# Patient Record
Sex: MALE | Race: WHITE | NOT HISPANIC OR LATINO | Employment: OTHER | URBAN - METROPOLITAN AREA
[De-identification: names, ages, dates, MRNs, and addresses within clinical notes are randomized per-mention and may not be internally consistent; named-entity substitution may affect disease eponyms.]

---

## 2017-01-03 ENCOUNTER — ALLSCRIPTS OFFICE VISIT (OUTPATIENT)
Dept: OTHER | Facility: OTHER | Age: 62
End: 2017-01-03

## 2017-02-10 ENCOUNTER — TRANSCRIBE ORDERS (OUTPATIENT)
Dept: MRI IMAGING | Facility: CLINIC | Age: 62
End: 2017-02-10

## 2017-02-10 DIAGNOSIS — K74.00 HEPATIC FIBROSIS: ICD-10-CM

## 2017-02-10 DIAGNOSIS — Z98.890 PERSONAL HISTORY OF SURGERY TO HEART AND GREAT VESSELS, PRESENTING HAZARDS TO HEALTH: ICD-10-CM

## 2017-02-10 DIAGNOSIS — Z94.4 LIVER REPLACED BY TRANSPLANT (HCC): ICD-10-CM

## 2017-02-10 DIAGNOSIS — Z94.4 TRANSPLANTED LIVER (HCC): Primary | ICD-10-CM

## 2017-02-10 DIAGNOSIS — M25.562 LEFT KNEE PAIN, UNSPECIFIED CHRONICITY: Primary | ICD-10-CM

## 2017-02-22 ENCOUNTER — APPOINTMENT (OUTPATIENT)
Dept: LAB | Facility: CLINIC | Age: 62
End: 2017-02-22
Payer: MEDICARE

## 2017-02-22 ENCOUNTER — HOSPITAL ENCOUNTER (OUTPATIENT)
Dept: MRI IMAGING | Facility: CLINIC | Age: 62
Discharge: HOME | End: 2017-02-22
Payer: MEDICARE

## 2017-02-22 ENCOUNTER — TRANSCRIBE ORDERS (OUTPATIENT)
Dept: MRI IMAGING | Facility: CLINIC | Age: 62
End: 2017-02-22

## 2017-02-22 DIAGNOSIS — Z94.4 TRANSPLANTED LIVER (HCC): ICD-10-CM

## 2017-02-22 DIAGNOSIS — M25.562 LEFT KNEE PAIN, UNSPECIFIED CHRONICITY: ICD-10-CM

## 2017-02-22 DIAGNOSIS — Z98.890 PERSONAL HISTORY OF SURGERY TO HEART AND GREAT VESSELS, PRESENTING HAZARDS TO HEALTH: ICD-10-CM

## 2017-02-22 DIAGNOSIS — K74.00 HEPATIC FIBROSIS: ICD-10-CM

## 2017-02-22 DIAGNOSIS — Z94.4 LIVER REPLACED BY TRANSPLANT (HCC): Primary | ICD-10-CM

## 2017-02-22 DIAGNOSIS — Z94.4 LIVER REPLACED BY TRANSPLANT (HCC): ICD-10-CM

## 2017-02-22 LAB
ALBUMIN SERPL BCP-MCNC: 4 G/DL (ref 3.5–5)
ALP SERPL-CCNC: 66 U/L (ref 46–116)
ALT SERPL W P-5'-P-CCNC: 25 U/L (ref 12–78)
ANION GAP SERPL CALCULATED.3IONS-SCNC: 9 MMOL/L (ref 4–13)
AST SERPL W P-5'-P-CCNC: 28 U/L (ref 5–45)
BASOPHILS # BLD AUTO: 0.02 THOUSANDS/ΜL (ref 0–0.1)
BASOPHILS NFR BLD AUTO: 0 % (ref 0–1)
BILIRUB SERPL-MCNC: 0.82 MG/DL (ref 0.2–1)
BUN SERPL-MCNC: 18 MG/DL (ref 5–25)
CALCIUM SERPL-MCNC: 9 MG/DL (ref 8.3–10.1)
CHLORIDE SERPL-SCNC: 101 MMOL/L (ref 100–108)
CO2 SERPL-SCNC: 26 MMOL/L (ref 21–32)
CREAT SERPL-MCNC: 1.07 MG/DL (ref 0.6–1.3)
EOSINOPHIL # BLD AUTO: 0.05 THOUSAND/ΜL (ref 0–0.61)
EOSINOPHIL NFR BLD AUTO: 1 % (ref 0–6)
ERYTHROCYTE [DISTWIDTH] IN BLOOD BY AUTOMATED COUNT: 12.8 % (ref 11.6–15.1)
GFR SERPL CREATININE-BSD FRML MDRD: >60 ML/MIN/1.73SQ M
GGT SERPL-CCNC: 232 U/L (ref 5–85)
GLUCOSE SERPL-MCNC: 110 MG/DL (ref 65–140)
HCT VFR BLD AUTO: 39.8 % (ref 36.5–49.3)
HGB BLD-MCNC: 13.4 G/DL (ref 12–17)
INR PPP: 1.19 (ref 0.86–1.16)
LYMPHOCYTES # BLD AUTO: 2.99 THOUSANDS/ΜL (ref 0.6–4.47)
LYMPHOCYTES NFR BLD AUTO: 43 % (ref 14–44)
MAGNESIUM SERPL-MCNC: 1.7 MG/DL (ref 1.6–2.6)
MCH RBC QN AUTO: 32.6 PG (ref 26.8–34.3)
MCHC RBC AUTO-ENTMCNC: 33.7 G/DL (ref 31.4–37.4)
MCV RBC AUTO: 97 FL (ref 82–98)
MONOCYTES # BLD AUTO: 0.51 THOUSAND/ΜL (ref 0.17–1.22)
MONOCYTES NFR BLD AUTO: 7 % (ref 4–12)
NEUTROPHILS # BLD AUTO: 3.4 THOUSANDS/ΜL (ref 1.85–7.62)
NEUTS SEG NFR BLD AUTO: 49 % (ref 43–75)
NRBC BLD AUTO-RTO: 0 /100 WBCS
PLATELET # BLD AUTO: 190 THOUSANDS/UL (ref 149–390)
PMV BLD AUTO: 11.9 FL (ref 8.9–12.7)
POTASSIUM SERPL-SCNC: 4.9 MMOL/L (ref 3.5–5.3)
PROT SERPL-MCNC: 7.4 G/DL (ref 6.4–8.2)
PROTHROMBIN TIME: 15.2 SECONDS (ref 12–14.3)
RBC # BLD AUTO: 4.11 MILLION/UL (ref 3.88–5.62)
SODIUM SERPL-SCNC: 136 MMOL/L (ref 136–145)
WBC # BLD AUTO: 6.98 THOUSAND/UL (ref 4.31–10.16)

## 2017-02-22 PROCEDURE — 85025 COMPLETE CBC W/AUTO DIFF WBC: CPT

## 2017-02-22 PROCEDURE — 80053 COMPREHEN METABOLIC PANEL: CPT

## 2017-02-22 PROCEDURE — 82977 ASSAY OF GGT: CPT

## 2017-02-22 PROCEDURE — 85610 PROTHROMBIN TIME: CPT

## 2017-02-22 PROCEDURE — 73721 MRI JNT OF LWR EXTRE W/O DYE: CPT

## 2017-02-22 PROCEDURE — A9585 GADOBUTROL INJECTION: HCPCS | Performed by: PAIN MEDICINE

## 2017-02-22 PROCEDURE — 80197 ASSAY OF TACROLIMUS: CPT

## 2017-02-22 PROCEDURE — 83735 ASSAY OF MAGNESIUM: CPT

## 2017-02-22 PROCEDURE — 74183 MRI ABD W/O CNTR FLWD CNTR: CPT

## 2017-02-22 PROCEDURE — 36415 COLL VENOUS BLD VENIPUNCTURE: CPT

## 2017-02-22 RX ADMIN — GADOBUTROL 10 ML: 604.72 INJECTION INTRAVENOUS at 11:09

## 2017-02-24 LAB — TACROLIMUS BLD LC/MS/MS-MCNC: 8.9 NG/ML (ref 2–20)

## 2017-02-27 ENCOUNTER — GENERIC CONVERSION - ENCOUNTER (OUTPATIENT)
Dept: OTHER | Facility: OTHER | Age: 62
End: 2017-02-27

## 2017-03-07 ENCOUNTER — GENERIC CONVERSION - ENCOUNTER (OUTPATIENT)
Dept: OTHER | Facility: OTHER | Age: 62
End: 2017-03-07

## 2017-03-22 ENCOUNTER — GENERIC CONVERSION - ENCOUNTER (OUTPATIENT)
Dept: OTHER | Facility: OTHER | Age: 62
End: 2017-03-22

## 2017-03-27 ENCOUNTER — GENERIC CONVERSION - ENCOUNTER (OUTPATIENT)
Dept: OTHER | Facility: OTHER | Age: 62
End: 2017-03-27

## 2017-04-18 ENCOUNTER — GENERIC CONVERSION - ENCOUNTER (OUTPATIENT)
Dept: OTHER | Facility: OTHER | Age: 62
End: 2017-04-18

## 2017-04-28 ENCOUNTER — GENERIC CONVERSION - ENCOUNTER (OUTPATIENT)
Dept: OTHER | Facility: OTHER | Age: 62
End: 2017-04-28

## 2017-05-26 ENCOUNTER — GENERIC CONVERSION - ENCOUNTER (OUTPATIENT)
Dept: OTHER | Facility: OTHER | Age: 62
End: 2017-05-26

## 2017-06-22 ENCOUNTER — GENERIC CONVERSION - ENCOUNTER (OUTPATIENT)
Dept: OTHER | Facility: OTHER | Age: 62
End: 2017-06-22

## 2017-07-01 DIAGNOSIS — R73.01 IMPAIRED FASTING GLUCOSE: ICD-10-CM

## 2017-07-01 DIAGNOSIS — E78.5 HYPERLIPIDEMIA: ICD-10-CM

## 2017-07-01 DIAGNOSIS — I10 ESSENTIAL (PRIMARY) HYPERTENSION: ICD-10-CM

## 2017-07-06 ENCOUNTER — ALLSCRIPTS OFFICE VISIT (OUTPATIENT)
Dept: OTHER | Facility: OTHER | Age: 62
End: 2017-07-06

## 2017-07-11 ENCOUNTER — GENERIC CONVERSION - ENCOUNTER (OUTPATIENT)
Dept: OTHER | Facility: OTHER | Age: 62
End: 2017-07-11

## 2017-07-26 ENCOUNTER — GENERIC CONVERSION - ENCOUNTER (OUTPATIENT)
Dept: OTHER | Facility: OTHER | Age: 62
End: 2017-07-26

## 2017-08-31 ENCOUNTER — GENERIC CONVERSION - ENCOUNTER (OUTPATIENT)
Dept: OTHER | Facility: OTHER | Age: 62
End: 2017-08-31

## 2017-09-25 ENCOUNTER — GENERIC CONVERSION - ENCOUNTER (OUTPATIENT)
Dept: OTHER | Facility: OTHER | Age: 62
End: 2017-09-25

## 2017-10-16 ENCOUNTER — GENERIC CONVERSION - ENCOUNTER (OUTPATIENT)
Dept: OTHER | Facility: OTHER | Age: 62
End: 2017-10-16

## 2017-11-14 ENCOUNTER — GENERIC CONVERSION - ENCOUNTER (OUTPATIENT)
Dept: OTHER | Facility: OTHER | Age: 62
End: 2017-11-14

## 2017-12-19 ENCOUNTER — GENERIC CONVERSION - ENCOUNTER (OUTPATIENT)
Dept: INTERNAL MEDICINE CLINIC | Facility: CLINIC | Age: 62
End: 2017-12-19

## 2018-01-13 VITALS
BODY MASS INDEX: 29.2 KG/M2 | HEART RATE: 70 BPM | RESPIRATION RATE: 16 BRPM | WEIGHT: 204 LBS | SYSTOLIC BLOOD PRESSURE: 140 MMHG | HEIGHT: 70 IN | DIASTOLIC BLOOD PRESSURE: 72 MMHG

## 2018-01-13 VITALS
HEIGHT: 70 IN | SYSTOLIC BLOOD PRESSURE: 136 MMHG | HEART RATE: 78 BPM | DIASTOLIC BLOOD PRESSURE: 88 MMHG | WEIGHT: 211 LBS | RESPIRATION RATE: 16 BRPM | BODY MASS INDEX: 30.21 KG/M2

## 2018-01-15 ENCOUNTER — GENERIC CONVERSION - ENCOUNTER (OUTPATIENT)
Dept: INTERNAL MEDICINE CLINIC | Facility: CLINIC | Age: 63
End: 2018-01-15

## 2018-03-08 DIAGNOSIS — F41.9 ANXIETY: Primary | ICD-10-CM

## 2018-03-08 DIAGNOSIS — E78.5 HYPERLIPIDEMIA, UNSPECIFIED HYPERLIPIDEMIA TYPE: ICD-10-CM

## 2018-03-08 DIAGNOSIS — K21.00 ESOPHAGITIS, REFLUX: ICD-10-CM

## 2018-03-08 RX ORDER — PANTOPRAZOLE SODIUM 40 MG/1
1 TABLET, DELAYED RELEASE ORAL DAILY
COMMUNITY
Start: 2013-12-27 | End: 2018-03-08 | Stop reason: SDUPTHER

## 2018-03-08 RX ORDER — PANTOPRAZOLE SODIUM 40 MG/1
40 TABLET, DELAYED RELEASE ORAL DAILY
Qty: 90 TABLET | Refills: 3 | Status: SHIPPED | OUTPATIENT
Start: 2018-03-08 | End: 2018-03-15 | Stop reason: SDUPTHER

## 2018-03-08 RX ORDER — PRAVASTATIN SODIUM 20 MG
20 TABLET ORAL DAILY
Qty: 90 TABLET | Refills: 3 | Status: SHIPPED | OUTPATIENT
Start: 2018-03-08 | End: 2018-03-08 | Stop reason: SDUPTHER

## 2018-03-08 RX ORDER — ALPRAZOLAM 2 MG/1
2 TABLET ORAL 3 TIMES DAILY PRN
Qty: 90 TABLET | Refills: 3 | Status: SHIPPED | OUTPATIENT
Start: 2018-03-08 | End: 2018-10-12

## 2018-03-08 RX ORDER — PRAVASTATIN SODIUM 20 MG
1 TABLET ORAL DAILY
COMMUNITY
Start: 2013-12-27 | End: 2018-03-08 | Stop reason: SDUPTHER

## 2018-03-08 RX ORDER — ALPRAZOLAM 2 MG/1
1 TABLET ORAL 3 TIMES DAILY PRN
COMMUNITY
Start: 2013-12-27 | End: 2018-03-08 | Stop reason: SDUPTHER

## 2018-03-09 RX ORDER — PRAVASTATIN SODIUM 20 MG
TABLET ORAL
Qty: 90 TABLET | Refills: 3 | Status: SHIPPED | OUTPATIENT
Start: 2018-03-09 | End: 2018-03-15 | Stop reason: SDUPTHER

## 2018-03-12 ENCOUNTER — APPOINTMENT (EMERGENCY)
Dept: RADIOLOGY | Facility: HOSPITAL | Age: 63
End: 2018-03-12
Payer: MEDICARE

## 2018-03-12 ENCOUNTER — APPOINTMENT (EMERGENCY)
Dept: CT IMAGING | Facility: HOSPITAL | Age: 63
End: 2018-03-12
Payer: MEDICARE

## 2018-03-12 ENCOUNTER — HOSPITAL ENCOUNTER (EMERGENCY)
Facility: HOSPITAL | Age: 63
Discharge: HOME | End: 2018-03-12
Attending: EMERGENCY MEDICINE
Payer: MEDICARE

## 2018-03-12 VITALS
HEIGHT: 72 IN | OXYGEN SATURATION: 96 % | RESPIRATION RATE: 16 BRPM | BODY MASS INDEX: 26.82 KG/M2 | TEMPERATURE: 97.7 F | SYSTOLIC BLOOD PRESSURE: 136 MMHG | HEART RATE: 66 BPM | WEIGHT: 198 LBS | DIASTOLIC BLOOD PRESSURE: 76 MMHG

## 2018-03-12 DIAGNOSIS — E87.1 HYPONATREMIA: ICD-10-CM

## 2018-03-12 DIAGNOSIS — E78.5 HYPERLIPIDEMIA, UNSPECIFIED HYPERLIPIDEMIA TYPE: ICD-10-CM

## 2018-03-12 DIAGNOSIS — F41.9 ANXIETY: ICD-10-CM

## 2018-03-12 DIAGNOSIS — E83.42 HYPOMAGNESEMIA: ICD-10-CM

## 2018-03-12 DIAGNOSIS — M25.462 EFFUSION OF LEFT KNEE: ICD-10-CM

## 2018-03-12 DIAGNOSIS — K21.00 ESOPHAGITIS, REFLUX: ICD-10-CM
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
## 2018-03-12 LAB
ALBUMIN SERPL BCP-MCNC: 3.5 G/DL (ref 3.5–5)
ALP SERPL-CCNC: 131 U/L (ref 46–116)
ALT SERPL W P-5'-P-CCNC: 38 U/L (ref 12–78)
ANION GAP SERPL CALCULATED.3IONS-SCNC: 8 MMOL/L (ref 4–13)
APTT PPP: 35 SECONDS (ref 23–35)
AST SERPL W P-5'-P-CCNC: 36 U/L (ref 5–45)
BASOPHILS # BLD AUTO: 0.03 THOUSANDS/ΜL (ref 0–0.1)
BASOPHILS NFR BLD AUTO: 0 % (ref 0–1)
BILIRUB SERPL-MCNC: 0.6 MG/DL (ref 0.2–1)
BUN SERPL-MCNC: 12 MG/DL (ref 5–25)
CALCIUM SERPL-MCNC: 8.6 MG/DL (ref 8.3–10.1)
CHLORIDE SERPL-SCNC: 94 MMOL/L (ref 100–108)
CO2 SERPL-SCNC: 27 MMOL/L (ref 21–32)
CREAT SERPL-MCNC: 1.03 MG/DL (ref 0.6–1.3)
EOSINOPHIL # BLD AUTO: 0.01 THOUSAND/ΜL (ref 0–0.61)
EOSINOPHIL NFR BLD AUTO: 0 % (ref 0–6)
ERYTHROCYTE [DISTWIDTH] IN BLOOD BY AUTOMATED COUNT: 12.2 % (ref 11.6–15.1)
GFR SERPL CREATININE-BSD FRML MDRD: 76 ML/MIN/1.73SQ M
GLUCOSE SERPL-MCNC: 133 MG/DL (ref 65–140)
HCT VFR BLD AUTO: 38.2 % (ref 36.5–49.3)
HGB BLD-MCNC: 12.9 G/DL (ref 12–17)
INR PPP: 1.15 (ref 0.86–1.16)
LYMPHOCYTES # BLD AUTO: 1.82 THOUSANDS/ΜL (ref 0.6–4.47)
LYMPHOCYTES NFR BLD AUTO: 24 % (ref 14–44)
MAGNESIUM SERPL-MCNC: 1.5 MG/DL (ref 1.6–2.6)
MCH RBC QN AUTO: 32.4 PG (ref 26.8–34.3)
MCHC RBC AUTO-ENTMCNC: 33.8 G/DL (ref 31.4–37.4)
MCV RBC AUTO: 96 FL (ref 82–98)
MONOCYTES # BLD AUTO: 0.39 THOUSAND/ΜL (ref 0.17–1.22)
MONOCYTES NFR BLD AUTO: 5 % (ref 4–12)
NEUTROPHILS # BLD AUTO: 5.23 THOUSANDS/ΜL (ref 1.85–7.62)
NEUTS SEG NFR BLD AUTO: 70 % (ref 43–75)
NRBC BLD AUTO-RTO: 0 /100 WBCS
PLATELET # BLD AUTO: 168 THOUSANDS/UL (ref 149–390)
PMV BLD AUTO: 9.8 FL (ref 8.9–12.7)
POTASSIUM SERPL-SCNC: 4.3 MMOL/L (ref 3.5–5.3)
PROT SERPL-MCNC: 7.9 G/DL (ref 6.4–8.2)
PROTHROMBIN TIME: 15 SECONDS (ref 12.1–14.4)
RBC # BLD AUTO: 3.98 MILLION/UL (ref 3.88–5.62)
SODIUM SERPL-SCNC: 129 MMOL/L (ref 136–145)
WBC # BLD AUTO: 7.51 THOUSAND/UL (ref 4.31–10.16)

## 2018-03-12 PROCEDURE — 85610 PROTHROMBIN TIME: CPT | Performed by: EMERGENCY MEDICINE

## 2018-03-12 PROCEDURE — 99284 EMERGENCY DEPT VISIT MOD MDM: CPT

## 2018-03-12 PROCEDURE — 83735 ASSAY OF MAGNESIUM: CPT | Performed by: EMERGENCY MEDICINE

## 2018-03-12 PROCEDURE — 36415 COLL VENOUS BLD VENIPUNCTURE: CPT | Performed by: EMERGENCY MEDICINE

## 2018-03-12 PROCEDURE — 73564 X-RAY EXAM KNEE 4 OR MORE: CPT

## 2018-03-12 PROCEDURE — 85730 THROMBOPLASTIN TIME PARTIAL: CPT | Performed by: EMERGENCY MEDICINE

## 2018-03-12 PROCEDURE — 85025 COMPLETE CBC W/AUTO DIFF WBC: CPT | Performed by: EMERGENCY MEDICINE

## 2018-03-12 PROCEDURE — 80053 COMPREHEN METABOLIC PANEL: CPT | Performed by: EMERGENCY MEDICINE

## 2018-03-12 RX ORDER — PANTOPRAZOLE SODIUM 40 MG/1
40 TABLET, DELAYED RELEASE ORAL DAILY
Qty: 90 TABLET | Refills: 3 | OUTPATIENT
Start: 2018-03-12

## 2018-03-12 RX ORDER — OXYCODONE HCL 20 MG/1
20 TABLET, FILM COATED, EXTENDED RELEASE ORAL ONCE
Status: COMPLETED | OUTPATIENT
Start: 2018-03-12 | End: 2018-03-12

## 2018-03-12 RX ORDER — ALPRAZOLAM 2 MG/1
2 TABLET ORAL 3 TIMES DAILY PRN
Qty: 90 TABLET | Refills: 3 | OUTPATIENT
Start: 2018-03-12

## 2018-03-12 RX ORDER — PRAVASTATIN SODIUM 20 MG
20 TABLET ORAL DAILY
Qty: 90 TABLET | Refills: 3 | OUTPATIENT
Start: 2018-03-12

## 2018-03-12 RX ADMIN — OXYCODONE HYDROCHLORIDE 20 MG: 20 TABLET, FILM COATED, EXTENDED RELEASE ORAL at 16:16

## 2018-03-12 NOTE — TELEPHONE ENCOUNTER
S/w pt and he is on his way to the ER due to hitting head and popping out his knee  He would really like Dr Gabino Melissa to come see him if he is admitted  We did schedule an appt for tomorrow 3/13 just in case he is not admitted

## 2018-03-12 NOTE — ED NOTES
Pt refused iv , CT and meds  Provider notified   Pt wants to be D/C' d     Miguel Cohen RN  03/12/18 [id number]

## 2018-03-12 NOTE — DISCHARGE INSTRUCTIONS
Hypomagnesemia   WHAT YOU NEED TO KNOW:   Hypomagnesemia is a condition that develops when the amount of magnesium in your body is too low  Magnesium is a mineral that helps your heart, muscles, and nerves work normally  It also helps strengthen your bones  DISCHARGE INSTRUCTIONS:   Seek care immediately if:   · You have numbness and tingling in your arms or legs  · You have painful muscle spasms and tremors in your arms or legs  · You are not able to move your muscles, and you have trouble thinking clearly  · Your heartbeat is faster than usual, or is irregular  · You have a seizure  Contact your healthcare provider if:   · You have fatigue and muscle tremors or twitching  · You become irritable and have trouble sleeping  · You have questions or concerns about your condition or care  Prevent hypomagnesemia:   · Manage health conditions  by following your treatment plan  Health conditions such as congestive heart failure, diabetes, and chronic diarrhea can put you at risk for hypomagnesemia  · Eat foods that contain magnesium every day  Ask your dietitian or healthcare provider how much magnesium you need each day  · Limit or do not drink alcohol  Alcohol can prevent your body from absorbing magnesium  Alcohol also makes your body release large amounts of magnesium through your urine  · You may need to take a magnesium supplement  Ask your healthcare provider which supplement to take and how often to take it    Foods that contain magnesium:   · Almonds, cashews, peanuts, and peanut butter    · Dark green leafy vegetables, such as spinach    · Raisins, bananas, apples, broccoli, and carrots    · Soy milk and soy beans    · Black beans and kidney beans    · Whole-wheat bread and brown rice    · Shredded-wheat cereal, oatmeal, and other breakfast cereals fortified with magnesium    · Plain low-fat yogurt and milk    · Cooked halibut  Follow up with your healthcare provider as directed: You may need more tests to monitor your condition  Write down your questions so you remember to ask them during your visits  © 2017 [address] Information is for End User's use only and may not be sold, redistributed or otherwise used for commercial purposes  All illustrations and images included in CareNotes® are the copyrighted property of [employer]  or [employer] Jim[employer]  The above information is an  only  It is not intended as medical advice for individual conditions or treatments  Talk to your doctor, nurse or pharmacist before following any medical regimen to see if it is safe and effective for you  Swollen Joint   WHAT YOU NEED TO KNOW:   What do I need to know about joint swelling? Joint swelling may occur in one or more joints  You may have other symptoms, such as pain, tenderness, or stiffness  A swollen joint may be caused by a variety of conditions such as arthritis, pseudogout, gout, tendinitis, or injury  How is joint swelling diagnosed? Your healthcare provider will ask about your symptoms  He will examine your joint and check how well it moves in different directions  He may do blood tests or x-rays to find the cause of the swelling  He may also remove fluid from your joint and send it to a lab for tests  How is joint swelling treated? Treatment depends on the cause of your swollen joint  Your healthcare provider may recommend any of the following:  · Rest  your swollen joint  Avoid activities that make the swelling or pain worse  You may need to avoid putting weight on your joint while you have pain  Crutches or a walker can be used to avoid putting weight on joints in your lower body  · Apply ice  on your swollen joint for 15 to 20 minutes every hour or as directed  Use an ice pack, or put crushed ice in a plastic bag  Cover it with a towel  Ice helps prevent tissue damage and decreases swelling and pain      · Apply heat  on your swollen joint for 20 to 30 minutes every 2 hours for as many days as directed  Heat helps decrease pain  · Elevate  your swollen joint above the level of your heart as often as you can  This will help decrease swelling and pain  Prop your joint on pillows or blankets to keep it elevated comfortably  · NSAIDs , such as ibuprofen, help decrease swelling, pain, and fever  This medicine is available with or without a doctor's order  NSAIDs can cause stomach bleeding or kidney problems in certain people  If you take blood thinner medicine, always ask your healthcare provider if NSAIDs are safe for you  Always read the medicine label and follow directions  When should I seek immediate care? · You cannot move your joint at all  · You have severe pain that does not get better with medicine  When should I contact my healthcare provider? · You have a fever  · You have redness or warmth over the joint  · The swelling does not decrease with treatment  · You have questions or concerns about your condition or care  CARE AGREEMENT:   You have the right to help plan your care  Learn about your health condition and how it may be treated  Discuss treatment options with your caregivers to decide what care you want to receive  You always have the right to refuse treatment  The above information is an  only  It is not intended as medical advice for individual conditions or treatments  Talk to your doctor, nurse or pharmacist before following any medical regimen to see if it is safe and effective for you  © 2017 [address] Information is for End User's use only and may not be sold, redistributed or otherwise used for commercial purposes  All illustrations and images included in CareNotes® are the copyrighted property of [employer]  or [employer] Jimenez  Swollen Joint   AMBULATORY CARE:   Joint swelling  may occur in one or more joints   You may have other symptoms, such as pain, tenderness, or stiffness  A swollen joint may be caused by a variety of conditions such as arthritis, pseudogout, gout, tendinitis, or injury  Seek care immediately if:   · You cannot move your joint at all  · You have severe pain that does not get better with medicine  Contact your healthcare provider if:   · You have a fever  · You have redness or warmth over the joint  · The swelling does not decrease with treatment  · You have questions or concerns about your condition or care  Treatment for a swollen joint  depends on the cause of your swollen joint  Your healthcare provider may recommend any of the following:  · Rest  your swollen joint  Avoid activities that make the swelling or pain worse  You may need to avoid putting weight on your joint while you have pain  Crutches or a walker can be used to avoid putting weight on joints in your lower body  · Apply ice  on your swollen joint for 15 to 20 minutes every hour or as directed  Use an ice pack, or put crushed ice in a plastic bag  Cover it with a towel  Ice helps prevent tissue damage and decreases swelling and pain  · Apply heat  on your swollen joint for 20 to 30 minutes every 2 hours for as many days as directed  Heat helps decrease pain  · Elevate  your swollen joint above the level of your heart as often as you can  This will help decrease swelling and pain  Prop your joint on pillows or blankets to keep it elevated comfortably  · NSAIDs , such as ibuprofen, help decrease swelling, pain, and fever  This medicine is available with or without a doctor's order  NSAIDs can cause stomach bleeding or kidney problems in certain people  If you take blood thinner medicine, always ask your healthcare provider if NSAIDs are safe for you  Always read the medicine label and follow directions  Follow up with your healthcare provider as directed:  Write down your questions so you remember to ask them during your visits     © 2017 [facility] Information is for End User's use only and may not be sold, redistributed or otherwise used for commercial purposes  All illustrations and images included in CareNotes® are the copyrighted property of [employer]  or [employer] Jim[employer]  The above information is an  only  It is not intended as medical advice for individual conditions or treatments  Talk to your doctor, nurse or pharmacist before following any medical regimen to see if it is safe and effective for you  Hyponatremia   WHAT YOU NEED TO KNOW:   Hyponatremia occurs when the amount of sodium (salt) in your blood is lower than normal  Sodium is an electrolyte (mineral) that helps your muscles, heart, and digestive system work properly  It helps control blood pressure and fluid balance  DISCHARGE INSTRUCTIONS:   Follow up with your healthcare provider as directed: You may need to return for more tests  Write down your questions so you remember to ask them during your visits  Nutrition:  You may need to increase your intake of sodium  Foods that are high in sodium include milk, packaged snacks such as pretzels, or processed meats (vidal, sausage, and ham)  Ask your dietitian to help you create a meal plan that is right for you  Liquids: Follow your healthcare provider's advice if you need to limit the amount of liquid you drink  Ask how much liquid to drink each day and which liquids are best for you  You may be asked to drink liquids that have water, sugar, and salt, such as juices, milk, or sports drinks  These liquids help your body hold in fluid and prevent dehydration  Contact your healthcare provider if:   · You have muscle cramps or twitching  · You feel very weak or tired  · You have nausea or are vomiting  · You have questions or concerns about your condition or care  Seek care immediately or call 911 if:   · You have a seizure  · You have an irregular heartbeat      · You have trouble breathing  · You cannot move your arms and legs  · You are confused or cannot think clearly  © 2017 [address]  Information is for End User's use only and may not be sold, redistributed or otherwise used for commercial purposes  All illustrations and images included in CareNotes® are the copyrighted property of [employer]  or Soh[employer] Jimenez  The above information is an  only  It is not intended as medical advice for individual conditions or treatments  Talk to your doctor, nurse or pharmacist before following any medical regimen to see if it is safe and effective for you  Arthritis   AMBULATORY CARE:   Arthritis  is a disease that causes inflammation in one or more joints  There are many types of arthritis, such as osteoarthritis, rheumatoid arthritis, and septic arthritis  Some types cause inflammation in the joints  Other types wear away the cartilage between joints  This makes the bones of the joint rub together when you move the joint  Your symptoms may be constant, or symptoms may come and go  Arthritis often gets worse over time and can cause permanent joint damage  Common signs and symptoms of arthritis:   · Pain, swelling, or stiffness in the joint    · Limited range of motion in the joint    · Warmth or redness over the joint    · Tenderness when you touch the joint    · Stiff joints in the morning that loosen with movement    · A creaking or grinding sound when you move the joint    · Fever  Seek care immediately if:   · You have a fever and severe joint pain or swelling  · You cannot move the affected joint  · You have severe joint pain you cannot tolerate  Contact your healthcare provider if:   · Your pain or swelling does not get better with treatment  · You have questions or concerns about your condition or care  Treatment  will depend on the type of arthritis you have and if it is severe   You may need any of the following:  · Acetaminophen  decreases pain and fever  It is available without a doctor's order  Ask how much to take and how often to take it  Follow directions  Acetaminophen can cause liver damage if not taken correctly  · NSAIDs , such as ibuprofen, help decrease swelling, pain, and fever  This medicine is available with or without a doctor's order  NSAIDs can cause stomach bleeding or kidney problems in certain people  If you take blood thinner medicine, always ask your healthcare provider if NSAIDs are safe for you  Always read the medicine label and follow directions  · Steroid medicine  helps reduce swelling and pain  · Surgery  may be needed to repair or replace a damaged joint  Manage arthritis:   · Rest your painful joint so it can heal   Your healthcare provider may recommend crutches or a walker if the affected joint is in a leg  · Apply ice or heat to the joint  Both can help decrease swelling and pain  Ice may also help prevent tissue damage  Use an ice pack, or put crushed ice in a plastic bag  Cover it with a towel and place it on your joint for 15 to 20 minutes every hour or as directed  You can apply heat for 20 minutes every 2 hours  Heat treatment includes hot packs or heat lamps  · Elevate your joint  Elevation helps reduce swelling and pain  Raise your joint above the level of your heart as often as you can  Prop your painful joint on pillows to keep it above your heart comfortably  · Go to physical or occupational therapy as directed  A physical therapist can teach you exercises to improve flexibility and range of motion  You may also be shown non-weight-bearing exercises that are safe for your joints, such as swimming  Exercise can help keep your joints flexible and reduce pain  An occupational therapist can help you learn to do your daily activities when your joints are stiff or sore  · Maintain a healthy weight    Extra weight puts increased pressure on your joints  Ask your healthcare provider what you should weigh  If you need to lose weight, he can help you create a weight loss program  Weight loss can help reduce pain and increase your ability to do your activities  The amount of exercise you do may vary each day, depending on your symptoms  · Wear flat or low-heeled shoes  This will help decrease pain and reduce pressure on your ankle, knee, and hip joints  · Use support devices as directed  You may be given splints to wear on your hands to help your joints rest and to decrease inflammation  While you sleep, use a pillow that is firm enough to support your neck and head  Other equipment  that may help you move and prevent falls:  · Orthotic shoes or insoles  help support your feet when you walk  · Crutches, a cane, or a walker  may help decrease your risk for falling  They also decrease stress on affected joints  · Devices to prevent falls  include raised toilet seats and bathtub bars to help you get up from sitting  Handrails can be placed in areas where you need balance and support  Follow up with your healthcare provider or rheumatologist as directed:  Write down your questions so you remember to ask them during your visits  © 2017 [address] Information is for End User's use only and may not be sold, redistributed or otherwise used for commercial purposes  All illustrations and images included in CareNotes® are the copyrighted property of [employer]  or [employer] Jimenez  The above information is an  only  It is not intended as medical advice for individual conditions or treatments  Talk to your doctor, nurse or pharmacist before following any medical regimen to see if it is safe and effective for you

## 2018-03-12 NOTE — TELEPHONE ENCOUNTER
Spoke to Nicholas the pharmacist at [facility] and called Alprazolam 2mg per Dd2/NC2 instructions

## 2018-03-12 NOTE — ED PROVIDER NOTES
History  Chief Complaint   Patient presents with   [place] Fall     Patient presents to the ED after falling 2 days ago while outside getting firewood  Patient complaining of left sided knee pain and swelling  Patient did have LOC     Patient is a 47-year-old male with a history of hypertension, hyperlipidemia, diabetes, COPD on 2 L nasal cannula, liver transplant on immunosuppressants coming in today after a fall 2 days ago  Patient states he was walking up to eat fire wood when he tripped over something that was covered in snow  He landed backwards on his head  He is unsure if he got knocked out; however, his daughter-in-law who is his caretaker says he was knocked out for several seconds  He did sustained a small abrasion and was bleeding  Patient had a difficult time getting up to ambulate due to left knee pain  Patient was taking his OxyContin every 12 hours for pain  Patient states his pain management physician told him not to take more than twice a day  Patient has no headache, visual changes, difficulty swallowing  He has no chest pain, shortness of breath  He has no chest pain, abdominal pain, nausea vomiting or diarrhea  He denies any pain throughout his upper extremities or right lower extremity    Patient has been having left knee pain over the past several years and feels this is exacerbated from the fall  He has been to [address] [place] [address] and they refused to do any operation  Can't you do that here now?"  Patient states the pain is more intense and the swelling is mildly worse  He has no difficulty moving his knee  History provided by:  Patient and caregiver   used: No    Fall   Mechanism of injury: fall    Injury location:  Head/neck and leg  Head/neck injury location:  Head  Leg injury location:  L knee  Incident location:  Home  Time since incident:  2 days  Arrived directly from scene: no    Fall:     Fall occurred: Outside at home      Impact surface:  Grass    Point of impact:  Head    Entrapped after fall: no    Suspicion of alcohol use: no    Suspicion of drug use: no    Associated symptoms: no abdominal pain, no back pain, no chest pain, no nausea, no neck pain and no vomiting        Prior to Admission Medications   Prescriptions Last Dose Informant Patient Reported? Taking? ALPRAZolam (XANAX) 2 MG tablet   No No   Sig: Take 1 tablet (2 mg total) by mouth 3 (three) times a day as needed (ANXIETY)   pantoprazole (PROTONIX) 40 mg tablet   No No   Sig: Take 1 tablet (40 mg total) by mouth daily   pravastatin (PRAVACHOL) 20 mg tablet   No No   Sig: Take 1 tablet by mouth daily  Facility-Administered Medications: None       Past Medical History:   Diagnosis Date    [facility]     Diabetes mellitus ([facility] )     Hyperlipidemia     Hypertension        Past Surgical History:   Procedure Laterality Date    CHOLECYSTECTOMY      HEPATITIS B SURFACE AB QN,LIVER TRANSPLANT (HISTORICAL)      HERNIA REPAIR         History reviewed  No pertinent family history  I have reviewed and agree with the history as documented  Social History   Substance Use Topics    Smoking status: Current Every Day Smoker     Packs/day: 0 50    Smokeless tobacco: Never Used    Alcohol use No        Review of Systems   Constitutional: Negative for diaphoresis and fever  HENT: Negative for ear pain and sore throat  Head injury   Eyes: Negative for visual disturbance  Respiratory: Negative for chest tightness and shortness of breath  Cardiovascular: Negative for chest pain and palpitations  Gastrointestinal: Negative for abdominal pain, nausea and vomiting  Genitourinary: Negative for difficulty urinating and dysuria  Musculoskeletal: Negative for back pain and neck pain  Knee pain, left   Skin: Negative for rash  Neurological: Negative for weakness  Psychiatric/Behavioral: Negative for confusion         Physical Exam  ED Triage Vitals   Temperature Pulse Respirations Blood Pressure SpO2   03/12/18 1522 03/12/18 1523 03/12/18 1523 03/12/18 1523 03/12/18 1523   97 7 °F (36 5 °C) 61 20 158/75 97 %      Temp Source Heart Rate Source Patient Position - Orthostatic VS BP Location FiO2 (%)   03/12/18 1522 03/12/18 1523 03/12/18 1523 03/12/18 1523 --   Oral Monitor Sitting Left arm       Pain Score       03/12/18 1523       Worst Possible Pain           Orthostatic Vital Signs  Vitals:    03/12/18 1523 03/12/18 1739   BP: 158/75 136/76   Pulse: 61 66   Patient Position - Orthostatic VS: Sitting Lying       Physical Exam   Constitutional: He is oriented to person, place, and time  He appears well-developed and well-nourished  No distress  HENT:   Head: Normocephalic  Right Ear: Hearing, tympanic membrane, external ear and ear canal normal    Left Ear: Hearing, tympanic membrane, external ear and ear canal normal    Nose: Nose normal    Mouth/Throat: Oropharynx is clear and moist and mucous membranes are normal    Small abrasion to the right occiput  This is scabbed over  Patient is maintaining airway  Maintaining secretions  Uvula midline without any edema   Eyes: Conjunctivae and EOM are normal  Pupils are equal, round, and reactive to light  Neck: Normal range of motion  Neck supple  No cervical spine tenderness, step-offs  Patient has full active range of motion of the cervical spine and pain-free   Cardiovascular: Normal rate, regular rhythm, normal heart sounds and intact distal pulses  No murmur heard  Pulmonary/Chest: Effort normal and breath sounds normal  No stridor  No respiratory distress  No anterior wall T tenderness or evidence of external trauma   Abdominal: Soft  Bowel sounds are normal  He exhibits no distension  There is no tenderness  Musculoskeletal: He exhibits no edema  Left knee: He exhibits decreased range of motion and swelling   He exhibits no effusion, no ecchymosis, no deformity, no laceration, no erythema, normal alignment, no LCL laxity, normal patellar mobility, no bony tenderness, normal meniscus and no MCL laxity  Tenderness found  Medial joint line, lateral joint line, MCL and LCL tenderness noted  No patellar tendon tenderness noted  Thoracic back: Normal         Lumbar back: Normal    Pelvis is stable  Patient has full active range of motion of the bilateral upper extremities including the shoulders, elbows and wrists  Manual muscle strength 5/5 bilaterally    Right lower extremity with full active range of motion of the right hip, knee and ankle  Many muscle strength 4+ out of 5     Patient has full active range of motion of the right hip with increase in pain to the right knee only  Functional active range of motion of the right knee  There is noted swelling without any effusions  There is no evidence of external trauma to the right knee  Full activ range of motion of the left ankle  Chronic venous stasis changes bilaterally   Neurological: He is alert and oriented to person, place, and time  No cranial nerve deficit  Skin: Skin is warm  Capillary refill takes less than 2 seconds  He is not diaphoretic  Nursing note and vitals reviewed        ED Medications  Medications   sodium chloride 0 9 % bolus 1,000 mL (1,000 mL Intravenous Not Given 3/12/18 1739)   magnesium oxide (MAG-OX) tablet 400 mg (400 mg Oral Not Given 3/12/18 1739)   oxyCODONE (OxyCONTIN) 12 hr tablet 20 mg (20 mg Oral Given 3/12/18 1616)       Diagnostic Studies  Results Reviewed     Procedure Component Value Units Date/Time    Comprehensive metabolic panel [[id number]]  (Abnormal) Collected:  03/12/18 1612    Lab Status:  Final result Specimen:  Blood from Arm, Right Updated:  03/12/18 1639     Sodium 129 (L) mmol/L      Potassium 4 3 mmol/L      Chloride 94 (L) mmol/L      CO2 27 mmol/L      Anion Gap 8 mmol/L      BUN 12 mg/dL      Creatinine 1 03 mg/dL      Glucose 133 mg/dL      Calcium 8 6 mg/dL      AST 36 U/L      ALT 38 U/L      Alkaline Phosphatase 131 (H) U/L      Total Protein 7 9 g/dL      Albumin 3 5 g/dL      Total Bilirubin 0 60 mg/dL      eGFR 76 ml/min/1 73sq m     Narrative:         National Kidney Disease Education Program recommendations are as follows:  GFR calculation is accurate only with a steady state creatinine  Chronic Kidney disease less than 60 ml/min/1 73 sq  meters  Kidney failure less than 15 ml/min/1 73 sq  meters  Magnesium [[id number]]  (Abnormal) Collected:  03/12/18 1612    Lab Status:  Final result Specimen:  Blood from Arm, Right Updated:  03/12/18 1639     Magnesium 1 5 (L) mg/dL     Protime-INR [[id number]]  (Abnormal) Collected:  03/12/18 1612    Lab Status:  Final result Specimen:  Blood from Arm, Right Updated:  03/12/18 1634     Protime 15 0 (H) seconds      INR 1 15    APTT [[id number]]  (Normal) Collected:  03/12/18 1612    Lab Status:  Final result Specimen:  Blood from Arm, Right Updated:  03/12/18 1634     PTT 35 seconds     Narrative: Therapeutic Heparin Range = 60-90 seconds    CBC and differential [[id number]]  (Normal) Collected:  03/12/18 1612    Lab Status:  Final result Specimen:  Blood from Arm, Right Updated:  03/12/18 1621     WBC 7 51 Thousand/uL      RBC 3 98 Million/uL      Hemoglobin 12 9 g/dL      Hematocrit 38 2 %      MCV 96 fL      MCH 32 4 pg      MCHC 33 8 g/dL      RDW 12 2 %      MPV 9 8 fL      Platelets 650 Thousands/uL      nRBC 0 /100 WBCs      Neutrophils Relative 70 %      Lymphocytes Relative 24 %      Monocytes Relative 5 %      Eosinophils Relative 0 %      Basophils Relative 0 %      Neutrophils Absolute 5 23 Thousands/µL      Lymphocytes Absolute 1 82 Thousands/µL      Monocytes Absolute 0 39 Thousand/µL      Eosinophils Absolute 0 01 Thousand/µL      Basophils Absolute 0 03 Thousands/µL                  XR knee 4+ vw left injury   Final Result by Grant Portillo MD (03/12 1626)      No acute osseous abnormality    Moderate joint effusion suspected  Degenerative changes  Workstation performed: [id number]                    Procedures  Procedures       Phone Contacts  ED Phone Contact    ED Course  ED Course                                MDM  Number of Diagnoses or Management Options  Effusion of left knee:   Fall, initial encounter:   Hypomagnesemia:   Hyponatremia:   Diagnosis management comments: Patient is a 68-year-old male with multiple medical come dishes coming in today after fall from home 2 days ago  Patient is nontoxic appearing and alert oriented x3 with no focal neuro deficits  Given patient's past medical history will CT head and neck  Will also obtain basic labs and x-ray left knee  Patient was asking for emergent left total knee replacement as well as increase in his pain medications  Long discussion on initial exam with patient and daughter lab bedside regarding this  Discussed with him options for 2nd opinion regarding his total left knee replacement and this is not done on emergent basis  Also discussed with him regarding his pain control  He is in contract with a pain medicine doctor  Will give a dose of oral pain medication here in the emergency room  If no acute abnormalities found he will need to follow up with his PCP, pain management specialist as well as Orthopedics  4:38 PM  Patient refused Ct's  "doesnt feel like he needs them"  5:33 PM  Patient refusing IV as well as IV fluids and magnesium replacement  Patient and daughter law updated on x-ray results as well as laboratory data  They both refused CT  The understands risks not having such completed  Patient's platelets and coags within normal limits and stable  Patient understands the risk of not having these completed that I cannot rule out intracranial events  As well as, cervical spine fractures  Patient does remain neuro intact with no focal neuro deficits    Patient still refuses CT scans    I discussed with patient regarding his hypomagnesemia as well as hyponatremia and wish for IV fluid replacement  Patient states Roscoe Ugarte are from a medication and I do not 1 thumb  Patient and daughter lab both understand and repeated the risks of not having such completed such as seizures and mental status changes as well as confusion  Patient's daughter-in-law states that she is his primary caretaker and will call 911 or have him return to the ER for further evaluation  Patient is alert oriented x3, cranial nerves 2-12 grossly intact with no focal neuro deficits  Patient is O here and can make his medical decisions  Patient asking again for a total knee replacement at this time  I discussed with him regarding need for follow-up with Orthopedic surgery as well as PCP  They understand the risks of leaving without placement of IV fluids/sodium as well as CTs  They understand the risks as well as return to ER instructions  Amount and/or Complexity of Data Reviewed  Clinical lab tests: ordered and reviewed  Tests in the radiology section of CPT®: ordered and reviewed  Tests in the medicine section of CPT®: ordered and reviewed      CritCare Time    Disposition  Final diagnoses:   Fall, initial encounter   Effusion of left knee   Hyponatremia   Hypomagnesemia     Time reflects when diagnosis was documented in both MDM as applicable and the Disposition within this note     Time User Action Codes Description Comment    3/12/2018  4:25 PM Yecenia Storm Add [Z19  Moise Watson Fall, initial encounter     3/12/2018  4:39 PM Yecenia Storm Add [V30 018] Effusion of left knee     3/12/2018  5:32 PM Keshawn Sanchez Add [E87 1] Hyponatremia     3/12/2018  5:33 PM Yecenia Storm Add [E83 42] Hypomagnesemia       ED Disposition     ED Disposition Condition Comment    Discharge  Soraya Villalba discharge to home/self care      Condition at discharge: Stable        Follow-up Information     Follow up With Specialties Details Why Contact Info Additional Information    Louis Burns MD Internal Medicine Schedule an appointment as soon as possible for a visit in 2 days  [address] [place], [address] [facility]       Quinton Muller MD Orthopedic Surgery Schedule an appointment as soon as possible for a visit in 3 days  [facility], [address] [facility]opedic Surgery Call in 3 days  [address] [place] [address] Case Management  Schedule an appointment as soon as possible for a visit in 1 week  [address] [facility], [address] [facility], [place], [place], [address]        Discharge Medication List as of 3/12/2018  5:33 PM      CONTINUE these medications which have NOT CHANGED    Details   ALPRAZolam (XANAX) 2 MG tablet Take 1 tablet (2 mg total) by mouth 3 (three) times a day as needed (ANXIETY), Starting Thu 3/8/2018, Print      pantoprazole (PROTONIX) 40 mg tablet Take 1 tablet (40 mg total) by mouth daily, Starting Thu 3/8/2018, Normal      pravastatin (PRAVACHOL) 20 mg tablet Take 1 tablet by mouth daily  , Normal           No discharge procedures on file      ED Provider  Electronically Signed by           Ja Leary DO  03/12/18 6622

## 2018-03-15 ENCOUNTER — OFFICE VISIT (OUTPATIENT)
Dept: INTERNAL MEDICINE CLINIC | Facility: CLINIC | Age: 63
End: 2018-03-15
Payer: MEDICARE

## 2018-03-15 VITALS
BODY MASS INDEX: 26.82 KG/M2 | OXYGEN SATURATION: 94 % | WEIGHT: 198 LBS | SYSTOLIC BLOOD PRESSURE: 126 MMHG | HEIGHT: 72 IN | DIASTOLIC BLOOD PRESSURE: 76 MMHG | HEART RATE: 68 BPM

## 2018-03-15 DIAGNOSIS — K21.00 ESOPHAGITIS, REFLUX: ICD-10-CM

## 2018-03-15 DIAGNOSIS — R73.01 IMPAIRED FASTING GLUCOSE: ICD-10-CM

## 2018-03-15 DIAGNOSIS — I10 ESSENTIAL HYPERTENSION: ICD-10-CM

## 2018-03-15 DIAGNOSIS — D84.9 IMMUNOSUPPRESSION (HCC): ICD-10-CM

## 2018-03-15 DIAGNOSIS — E83.42 HYPOMAGNESEMIA: ICD-10-CM

## 2018-03-15 DIAGNOSIS — F41.9 ANXIETY: ICD-10-CM

## 2018-03-15 DIAGNOSIS — I25.10 ARTERIOSCLEROSIS OF CORONARY ARTERY: ICD-10-CM

## 2018-03-15 DIAGNOSIS — T86.40 COMPLICATION OF TRANSPLANTED LIVER, UNSPECIFIED COMPLICATION (HCC): ICD-10-CM

## 2018-03-15 DIAGNOSIS — E78.5 HYPERLIPIDEMIA, UNSPECIFIED HYPERLIPIDEMIA TYPE: ICD-10-CM

## 2018-03-15 DIAGNOSIS — E78.2 MIXED HYPERLIPIDEMIA: ICD-10-CM

## 2018-03-15 DIAGNOSIS — J42 CHRONIC BRONCHITIS, UNSPECIFIED CHRONIC BRONCHITIS TYPE (HCC): ICD-10-CM

## 2018-03-15 DIAGNOSIS — M17.12 PRIMARY OSTEOARTHRITIS OF LEFT KNEE: ICD-10-CM

## 2018-03-15 DIAGNOSIS — E87.1 HYPONATREMIA: Primary | ICD-10-CM

## 2018-03-15 DIAGNOSIS — I10 HYPERTENSION, UNSPECIFIED TYPE: Primary | ICD-10-CM

## 2018-03-15 DIAGNOSIS — Z94.4 LIVER TRANSPLANTED (HCC): ICD-10-CM

## 2018-03-15 PROCEDURE — 99214 OFFICE O/P EST MOD 30 MIN: CPT | Performed by: INTERNAL MEDICINE

## 2018-03-15 RX ORDER — ATENOLOL 100 MG/1
100 TABLET ORAL 2 TIMES DAILY
COMMUNITY
Start: 2013-12-27 | End: 2018-03-15 | Stop reason: SDUPTHER

## 2018-03-15 RX ORDER — OXYCODONE HYDROCHLORIDE 10 MG/1
10 TABLET ORAL EVERY 6 HOURS PRN
COMMUNITY
Start: 2018-02-28 | End: 2020-08-25

## 2018-03-15 RX ORDER — ATENOLOL 100 MG/1
100 TABLET ORAL 2 TIMES DAILY
Qty: 180 TABLET | Refills: 3 | Status: SHIPPED | OUTPATIENT
Start: 2018-03-15 | End: 2018-11-13 | Stop reason: SDUPTHER

## 2018-03-15 RX ORDER — OXYCODONE HCL 40 MG/1
40 TABLET, FILM COATED, EXTENDED RELEASE ORAL EVERY 12 HOURS SCHEDULED
COMMUNITY
Start: 2017-07-06 | End: 2020-04-07

## 2018-03-15 RX ORDER — PANTOPRAZOLE SODIUM 40 MG/1
40 TABLET, DELAYED RELEASE ORAL DAILY
Qty: 90 TABLET | Refills: 3 | Status: SHIPPED | OUTPATIENT
Start: 2018-03-15 | End: 2018-11-13 | Stop reason: SDUPTHER

## 2018-03-15 RX ORDER — TACROLIMUS 1 MG/1
CAPSULE ORAL
COMMUNITY
Start: 2014-03-04

## 2018-03-15 RX ORDER — PRAVASTATIN SODIUM 20 MG
20 TABLET ORAL DAILY
Qty: 90 TABLET | Refills: 3 | Status: SHIPPED | OUTPATIENT
Start: 2018-03-15 | End: 2018-11-13 | Stop reason: SDUPTHER

## 2018-03-15 NOTE — PROGRESS NOTES
Assessment/Plan:    No problem-specific Assessment & Plan notes found for this encounter  Diagnoses and all orders for this visit:    Hyponatremia  -     TSH, 3rd generation with T4 reflex; Future  -     Osmolality, urine  -     Osmolality; Future  -     Sodium, urine, random  -     Cortisol; Future    Hypomagnesemia  -     magnesium oxide (MAG-OX) 400 mg; Take 1 tablet (400 mg total) by mouth 2 (two) times a day    Complication of transplanted liver, unspecified complication (HCC)    Esophagitis, reflux    Liver transplanted (HCC)    Chronic bronchitis, unspecified chronic bronchitis type ([facility] )    Arteriosclerosis of coronary artery    Essential hypertension  -     CBC; Future  -     Comprehensive metabolic panel; Future    Primary osteoarthritis of left knee  -     Ambulatory referral to Orthopedic Surgery; Future    Anxiety    Mixed hyperlipidemia  -     Lipid panel; Future    Immunosuppression (HCC)    Impaired fasting glucose  -     Hemoglobin A1c; Future    Other orders  -     atenolol (TENORMIN) 100 mg tablet; Take 100 mg by mouth 2 (two) times a day  -     tacrolimus (PROGRAF) 1 mg capsule; Take 2 mg by mouth 2 (two) times a day  -     aspirin 81 MG tablet; Take 1 tablet by mouth daily  -     oxyCODONE (OXYCONTIN) 40 mg 12 hr tablet; Take by mouth  -     oxyCODONE (ROXICODONE) 10 MG TABS; Earliest Fill Date: 2/28/18         Patient Instructions     Emergency department workup, lab data and x-ray reviewed    Osteoarthritis of the knee -refer for another orthopedic opinion     Hypo magnesium  And hyponatremia- check lab workup, start magnesium supplement twice daily    History of diet-controlled type 2 diabetes mellitus, coronary artery disease, hypertension, hyperlipidemia - check lab work and follow up to review      Subjective:      Patient ID: Elder Ibarra is a 70 y o  male  Patient was evaluated emergency department yesterday for acute left knee pain, patient fell    X-rays reviewed showing significant osteoarthritis as well as moderate effusion  Patient has been evaluated by several orthopedists in the past and was told he was not a good surgical candidate based upon immune suppressed status after his liver transplant  He is also noted to be hyponatremic and hypomagnesemic in the emergency department, fluids were offered but declined  He is not taking any magnesium supplements at this time  He admits to drinking minimal amount of alcohol but not to excess  He continues to have significant pain from his neuropathy and predominantly from the left knee and does not feel that he is getting adequate analgesia from his pain management specialist     I have no lab work for review regarding lipids, A1c or thyroid  The following portions of the patient's history were reviewed and updated as appropriate: allergies, current medications, past family history, past medical history, past social history, past surgical history and problem list     Review of Systems   Constitutional: Negative for appetite change, chills, diaphoresis, fatigue, fever and unexpected weight change  HENT: Negative for congestion, hearing loss and rhinorrhea  Eyes: Negative for visual disturbance  Respiratory: Negative for cough, chest tightness, shortness of breath and wheezing  Cardiovascular: Negative for chest pain, palpitations and leg swelling  Gastrointestinal: Negative for abdominal pain and blood in stool  Endocrine: Negative for cold intolerance, heat intolerance, polydipsia and polyuria  Genitourinary: Negative for difficulty urinating, dysuria, frequency and urgency  Musculoskeletal: Positive for arthralgias and gait problem  Negative for myalgias  Skin: Negative for rash  Neurological: Negative for dizziness, weakness, light-headedness and headaches  Hematological: Does not bruise/bleed easily  Psychiatric/Behavioral: Negative for dysphoric mood and sleep disturbance  Objective:      /76   Pulse 68   Ht 6' (1 829 m)   Wt 89 8 kg (198 lb)   SpO2 94%   BMI 26 85 kg/m²          Physical Exam   Constitutional: He is oriented to person, place, and time  He appears well-developed and well-nourished  HENT:   Head: Normocephalic and atraumatic  Nose: Nose normal    Mouth/Throat: Oropharynx is clear and moist    Eyes: Conjunctivae and EOM are normal  Pupils are equal, round, and reactive to light  No scleral icterus  Neck: Normal range of motion  Neck supple  No JVD present  No tracheal deviation present  No thyromegaly present  Cardiovascular: Normal rate, regular rhythm and intact distal pulses  Exam reveals no gallop and no friction rub  No murmur heard  Pulmonary/Chest: Effort normal and breath sounds normal  No respiratory distress  He has no wheezes  He has no rales  Abdominal: Soft  Bowel sounds are normal    Musculoskeletal: He exhibits no edema or tenderness  L knee ttp w/ moderate effusion   Lymphadenopathy:     He has no cervical adenopathy  Neurological: He is alert and oriented to person, place, and time  No cranial nerve deficit  Skin: Skin is warm and dry  No rash noted  No erythema  Psychiatric: He has a normal mood and affect   His behavior is normal  Judgment and thought content normal

## 2018-03-15 NOTE — TELEPHONE ENCOUNTER
I changed pharm, just need to sign off  Alprazolam was called in 3/8, mail order requires 90 day supply, can I call in 90 day supply and cancel order at [facility]?

## 2018-03-15 NOTE — PATIENT INSTRUCTIONS
Emergency department workup, lab data and x-ray reviewed    Osteoarthritis of the knee -refer for another orthopedic opinion     Hypo magnesium  And hyponatremia- check lab workup, start magnesium supplement twice daily    History of diet-controlled type 2 diabetes mellitus, coronary artery disease, hypertension, hyperlipidemia - check lab work and follow up to review

## 2018-03-16 NOTE — TELEPHONE ENCOUNTER
Called pt to notify him that his medication as faxed to [facility] 3/12/18   The patient left the office before the visit was finished   Detailed message on pt voicemail

## 2018-03-22 ENCOUNTER — TELEPHONE (OUTPATIENT)
Dept: INTERNAL MEDICINE CLINIC | Facility: CLINIC | Age: 63
End: 2018-03-22

## 2018-03-22 NOTE — TELEPHONE ENCOUNTER
Spoke w/ pt he said he will get bw done as soon as he can find transportation    He says he tests his sugars 3 times a day

## 2018-03-22 NOTE — TELEPHONE ENCOUNTER
Patient needs to do his hemoglobin A1c    Please contact him and have him do the blood work, also asked if he is even checking his blood sugars, I can't make up another diagnosis code without that information

## 2018-03-22 NOTE — TELEPHONE ENCOUNTER
[employer] calling re: glucose strips form completed on 12/21/17 w/ a pre-diabetic code which the insurance will not cover  Pls have DD replace code and initial form and fax to [phone]

## 2018-04-16 ENCOUNTER — TELEPHONE (OUTPATIENT)
Dept: INTERNAL MEDICINE CLINIC | Facility: CLINIC | Age: 63
End: 2018-04-16

## 2018-04-16 LAB — HBA1C MFR BLD HPLC: 5.6 %

## 2018-04-16 NOTE — TELEPHONE ENCOUNTER
Racheal Caceres from [facility] called and gave pt's PT & INR    INR is 1 1 on the 4/14

## 2018-08-28 ENCOUNTER — TELEPHONE (OUTPATIENT)
Dept: INTERNAL MEDICINE CLINIC | Facility: CLINIC | Age: 63
End: 2018-08-28

## 2018-09-10 LAB — HBA1C MFR BLD HPLC: 5.5 %

## 2018-10-09 DIAGNOSIS — F41.9 ANXIETY: ICD-10-CM

## 2018-10-09 RX ORDER — ALPRAZOLAM 2 MG/1
TABLET ORAL
Qty: 90 TABLET | Refills: 3 | OUTPATIENT
Start: 2018-10-09

## 2018-10-09 NOTE — TELEPHONE ENCOUNTER
PT CALLED BACK AND WAS NOTIFIED  AND WONDERING IF YOU ARE GOING TO REFILL THESE OR GIVE HIM SOMETHING ELSE-PLEASE ADVISE

## 2018-10-12 RX ORDER — ALPRAZOLAM 0.25 MG/1
1 TABLET ORAL 3 TIMES DAILY PRN
Qty: 30 TABLET | Refills: 0 | Status: SHIPPED | OUTPATIENT
Start: 2018-10-12 | End: 2018-10-17 | Stop reason: SDUPTHER

## 2018-10-12 NOTE — TELEPHONE ENCOUNTER
Patient called back to check on this  He said if Dr Yashira Ruiz wants him to stop taking it, he's ok with that, but he'd like to be weaned off  He's already cut his dose in half-afraid of running out and going through withdraw/possibly having a sezure  Please advise      #[phone]

## 2018-10-12 NOTE — TELEPHONE ENCOUNTER
CALLED PT  AND WAS NOTIFIED NEEDS APPT  AND NOW WONDERING HOW SICK HE IS GOING TO GET WHEN HE HAS NO PILLS LEFT AS HAS 4-5 PILLS LEFT  WILL BE GOING TO [facility]E  FOR TESTING NEXT WEEK, WONDERING IF HE CAN BE Maryam Marie PT  -[phone]

## 2018-10-15 ENCOUNTER — TELEPHONE (OUTPATIENT)
Dept: INTERNAL MEDICINE CLINIC | Facility: CLINIC | Age: 63
End: 2018-10-15

## 2018-10-15 RX ORDER — ALPRAZOLAM 2 MG/1
TABLET ORAL
Qty: 90 TABLET | Refills: 3 | OUTPATIENT
Start: 2018-10-15

## 2018-10-15 NOTE — TELEPHONE ENCOUNTER
Patient was supposed to be dropped down to 1 MG of xanax from 2 MG   When the Rx went through it went as  25 = take 4- tablets ( 1 Mg  Total )  3 times a day  The patient picked up Rx and started taking that today  He will not have enough to last until he sees you on 11/01/2018  I spoke to the daughter and told her that we will  prescribe the remaining doses today  That when RX  converted it messed up the total count  She ensured me she understood and knows that Jonelle Kocher must keep appointment on 11/01/18

## 2018-10-15 NOTE — TELEPHONE ENCOUNTER
No, I refused it and told him he needed an appointment     He was worried about withdrawal so I gave him 30 tablets, 1 mg each in order to taper and told him he needed an appointment

## 2018-10-15 NOTE — TELEPHONE ENCOUNTER
Discharge Planning Assessment  [facility]     Patient Name: Reinaldo Grossman  MRN: [id number]  [facility] home.  Pt is MIKAL'Pt's daughter in law Oli Sher called back  She scheduled an appt for 11/1/18 with Dr Marques Servant to go over dosage  Pt could not come in sooner because Oliania Locksidyamilet would be the one driving him to the appt since he does not drive, and had to work around her work schedule  She stated that his pain management doctor at [facility] was supposed to be responsible for refilling alprazolam but he will not because this medication is not for pain   He will be out of his medication tomorrow, wants to know if he could have a refill to hold him over until his appt on 11/1     [facility] [place]     Any questions call pt  [phone]   01/09/18 1552    Patient Forms    Provider Choice List --   IMM on 1/8          Shelly Ibanez RN

## 2018-10-17 ENCOUNTER — TELEPHONE (OUTPATIENT)
Dept: INTERNAL MEDICINE CLINIC | Facility: CLINIC | Age: 63
End: 2018-10-17

## 2018-10-17 DIAGNOSIS — F41.9 ANXIETY: ICD-10-CM

## 2018-10-17 RX ORDER — ALPRAZOLAM 1 MG/1
1 TABLET ORAL 3 TIMES DAILY PRN
Qty: 30 TABLET | Refills: 0 | Status: SHIPPED | OUTPATIENT
Start: 2018-10-17 | End: 2018-11-01 | Stop reason: SDUPTHER

## 2018-10-17 NOTE — TELEPHONE ENCOUNTER
What prescription requires PA? He already p/u the 30 x 0 25 mg tabs, and I just ordered 30 x 1 mg tabs

## 2018-11-01 ENCOUNTER — OFFICE VISIT (OUTPATIENT)
Dept: INTERNAL MEDICINE CLINIC | Facility: CLINIC | Age: 63
End: 2018-11-01
Payer: MEDICARE

## 2018-11-01 VITALS
WEIGHT: 175.8 LBS | DIASTOLIC BLOOD PRESSURE: 90 MMHG | BODY MASS INDEX: 23.81 KG/M2 | RESPIRATION RATE: 14 BRPM | SYSTOLIC BLOOD PRESSURE: 130 MMHG | HEART RATE: 64 BPM | HEIGHT: 72 IN

## 2018-11-01 DIAGNOSIS — F41.9 ANXIETY: ICD-10-CM

## 2018-11-01 DIAGNOSIS — K21.00 ESOPHAGITIS, REFLUX: Primary | ICD-10-CM

## 2018-11-01 DIAGNOSIS — S91.002S WOUND OF LEFT ANKLE, SEQUELA: ICD-10-CM

## 2018-11-01 DIAGNOSIS — E78.2 MIXED HYPERLIPIDEMIA: ICD-10-CM

## 2018-11-01 DIAGNOSIS — L28.1 PICKER'S NODULES: ICD-10-CM

## 2018-11-01 DIAGNOSIS — D17.1 LIPOMA OF TORSO: ICD-10-CM

## 2018-11-01 DIAGNOSIS — Z94.4 LIVER TRANSPLANTED (HCC): ICD-10-CM

## 2018-11-01 DIAGNOSIS — M17.12 PRIMARY OSTEOARTHRITIS OF LEFT KNEE: ICD-10-CM

## 2018-11-01 DIAGNOSIS — Z72.0 TOBACCO ABUSE: ICD-10-CM

## 2018-11-01 DIAGNOSIS — R73.01 IMPAIRED FASTING GLUCOSE: ICD-10-CM

## 2018-11-01 DIAGNOSIS — D84.9 IMMUNOSUPPRESSION (HCC): ICD-10-CM

## 2018-11-01 DIAGNOSIS — J42 CHRONIC BRONCHITIS, UNSPECIFIED CHRONIC BRONCHITIS TYPE (HCC): ICD-10-CM

## 2018-11-01 PROBLEM — G60.9 IDIOPATHIC PERIPHERAL NEUROPATHY: Status: ACTIVE | Noted: 2018-06-28

## 2018-11-01 PROCEDURE — 99215 OFFICE O/P EST HI 40 MIN: CPT | Performed by: INTERNAL MEDICINE

## 2018-11-01 RX ORDER — UBIDECARENONE/VIT E ACET 100MG-5
CAPSULE ORAL
COMMUNITY
End: 2020-02-20

## 2018-11-01 RX ORDER — ALPRAZOLAM 0.5 MG/1
0.5 TABLET ORAL 3 TIMES DAILY PRN
Qty: 90 TABLET | Refills: 0 | Status: SHIPPED | OUTPATIENT
Start: 2018-11-01 | End: 2018-11-30 | Stop reason: SDUPTHER

## 2018-11-01 NOTE — PATIENT INSTRUCTIONS
Lab data reviewed in detail and compared to prior    Anxiety-need to wean off of the benzodiazepines due to concurrent narcotic use and risk for accidental overdose and respiratory suppression  Patient is down to 1 mg daily now after abruptly withdrawing from up to 6 mg per day and is feeling considerable palpitations and distress  Will increase to 0 5 mg 3 times daily  Next week initiate tapering by taking a half tablet every other day in the morning and a whole tablet in the afternoon and evening following week you will take a half tablet every day in the morning and a whole tablet in the afternoon and evening the following week you will take a half tablet every day in the morning half tablet alternating with a whole tablet every other day in the afternoon and a whole tablet in the evening the following week he will take half tablet in the morning, half tablet every day in the afternoon and a whole tablet in the evening  He will follow up with me at that time    Coronary artery disease stable without angina, continue to modify risk factors    Hyperlipidemia stable on pravastatin    Tobacco abuse-cessation stressed, you doing a great job tapering down on her own, keep up the good work  Lung cancer screening CT ordered, they will contact you to schedule this we will review at her follow-up next month    Nonhealing lesion on the left ankle-no evidence for acute infection patient referred to wound management, call [phone]    Fibrosis status post liver transplant has improved from stage III to stage II following with transplant center at [address]    Chronic pain syndrome managed by pain management    Hypertension stable on present regimen    GERD stable with pantoprazole  Subcutaneous fatty lesion on left flank is most likely lipoma, no indication for further workup or treatment at this time      Impaired fasting glucose stable with A1c 5 5    Rash consistent with occurs nodules-pathophysiology discussed  Avoid scratching  File your nails down  Use combination of over the counter hydrocortisone cream mixed 1 part to 1 with hypoallergenic lotion such as Eucerin calming cream, gold Bond, Aveeno etc and then leave alone      follow-up in 1 month

## 2018-11-01 NOTE — PROGRESS NOTES
Assessment/Plan:    Patient Instructions   Lab data reviewed in detail and compared to prior    Anxiety-need to wean off of the benzodiazepines due to concurrent narcotic use and risk for accidental overdose and respiratory suppression  Patient is down to 1 mg daily now after abruptly withdrawing from up to 6 mg per day and is feeling considerable palpitations and distress  Will increase to 0 5 mg 3 times daily  Next week initiate tapering by taking a half tablet every other day in the morning and a whole tablet in the afternoon and evening following week you will take a half tablet every day in the morning and a whole tablet in the afternoon and evening the following week you will take a half tablet every day in the morning half tablet alternating with a whole tablet every other day in the afternoon and a whole tablet in the evening the following week he will take half tablet in the morning, half tablet every day in the afternoon and a whole tablet in the evening  He will follow up with me at that time    Coronary artery disease stable without angina, continue to modify risk factors    Hyperlipidemia stable on pravastatin    Tobacco abuse-cessation stressed, you doing a great job tapering down on her own, keep up the good work  Lung cancer screening CT ordered, they will contact you to schedule this we will review at her follow-up next month    Nonhealing lesion on the left ankle-no evidence for acute infection patient referred to wound management, call [phone]    Fibrosis status post liver transplant has improved from stage III to stage II following with transplant center at [facility]    Chronic pain syndrome managed by pain management    Hypertension stable on present regimen    GERD stable with pantoprazole  Subcutaneous fatty lesion on left flank is most likely lipoma, no indication for further workup or treatment at this time      Impaired fasting glucose stable with A1c 5 5    Rash consistent with occurs nodules-pathophysiology discussed  Avoid scratching  File your nails down  Use combination of over the counter hydrocortisone cream mixed 1 part to 1 with hypoallergenic lotion such as Eucerin calming cream, gold Bond, Aveeno etc and then leave alone  follow-up in 1 month         Diagnoses and all orders for this visit:    Esophagitis, reflux    Anxiety  -     ALPRAZolam (XANAX) 0 5 mg tablet; Take 1 tablet (0 5 mg total) by mouth 3 (three) times a day as needed for anxiety  -     TSH, 3rd generation with Free T4 reflex; Future    Liver transplanted ([facility] [address] )    Impaired fasting glucose  -     Hemoglobin A1C; Future  -     Microalbumin / creatinine urine ratio; Future    Chronic bronchitis, unspecified chronic bronchitis type (HCC)    Primary osteoarthritis of left knee    Mixed hyperlipidemia  -     CBC and differential; Future  -     Comprehensive metabolic panel; Future  -     Lipid panel; Future  -     TSH, 3rd generation with Free T4 reflex; Future    Immunosuppression (HCC)    's nodules    Wound of left ankle, sequela    Tobacco abuse  -     CT lung screening program; Future    Lipoma of torso    Other orders  -     MAGNESIUM PO; Take 500 mg by mouth  -     Discontinue: Morphine-Naltrexone 80-3 2 MG CPCR; 1 tablet  -     Sennosides 15 MG TABS; Take by mouth  -     Calcium Carb-Cholecalciferol 1000-800 MG-UNIT TABS; Take 1 tablet by mouth  -     Resveratrol 50 MG CAPS; Take by mouth         Subjective:      Patient ID: Lisbeth Hamman is a 70 y o  male    Patient presents today and follow up with caregiver present  Patient states he has been decreasing his Xanax  To 0 5 mg twice daily but complains of butterflies, anxiety, and palpitations  He misunderstood the instructions as the prescription was for 1 mg 3 times daily but he has been trying to ration his medication in order to get to this visit  Previous to this he was taking 2 mg 3 times a day      He continues to follow up with pain management at [place] [address]  He is taking 40 mg of OxyContin twice daily and 10 mg of oxycodone twice daily for his knee, back, foot pain  He rates his pain as 8/10 in the office today  He states he has had a pruritic rash on his upper torso, arms, lower legs  This began around 2 weeks ago when he began his decrease of Xanax  He has not used anything on the rash for relief  He states he would like a lump on his left mid back to be looked at today  He states he has had this for several years but is increasing in growth  It is nontender and non reddened  It is not bothersome  He states he is following up with his transplant surgery team from [address] [place] [address] in [place]  They have categorized his liver fibrosis as stage II which is   an improvement  Patient states he has decreased his cigarette smoking to 2-3 cigarettes per day  He does not have any difficulty breathing or a chronic cough  He has over a 30 year history of a pack per day cigarette smoking  He does not recall if he has had a low-dose CT in the past     Patient states that he has a non healing wound on his left lateral ankle  His caregiver states this has been ongoing for 2-3 years and has seen Podiatry at [place] [address]                 Current Outpatient Prescriptions:     ALPRAZolam (XANAX) 0 5 mg tablet, Take 1 tablet (0 5 mg total) by mouth 3 (three) times a day as needed for anxiety, Disp: 90 tablet, Rfl: 0    aspirin 81 MG tablet, Take 1 tablet by mouth daily, Disp: , Rfl:     atenolol (TENORMIN) 100 mg tablet, Take 1 tablet (100 mg total) by mouth 2 (two) times a day, Disp: 180 tablet, Rfl: 3    Calcium Carb-Cholecalciferol 1000-800 MG-UNIT TABS, Take 1 tablet by mouth, Disp: , Rfl:     magnesium oxide (MAG-OX) 400 mg, Take 1 tablet (400 mg total) by mouth 2 (two) times a day, Disp: 60 tablet, Rfl: 0    MAGNESIUM PO, Take 500 mg by mouth, Disp: , Rfl:     oxyCODONE (OXYCONTIN) 40 mg 12 hr tablet, Take by mouth, Disp: , Rfl:     oxyCODONE (ROXICODONE) 10 MG TABS, , Disp: , Rfl:     pantoprazole (PROTONIX) 40 mg tablet, Take 1 tablet (40 mg total) by mouth daily, Disp: 90 tablet, Rfl: 3    pravastatin (PRAVACHOL) 20 mg tablet, Take 1 tablet (20 mg total) by mouth daily, Disp: 90 tablet, Rfl: 3    Resveratrol 50 MG CAPS, Take by mouth, Disp: , Rfl:     Sennosides 15 MG TABS, Take by mouth, Disp: , Rfl:     tacrolimus (PROGRAF) 1 mg capsule, Take 2 mg by mouth 2 (two) times a day, Disp: , Rfl:     No results found for this or any previous visit (from the past 1008 hour(s))  The following portions of the patient's history were reviewed and updated as appropriate: allergies, current medications, past family history, past medical history, past social history, past surgical history and problem list      Review of Systems   Constitutional: Negative for appetite change, chills and fever  HENT: Negative for postnasal drip and sore throat  Respiratory: Negative for cough, chest tightness, shortness of breath and wheezing  Cardiovascular: Positive for palpitations  Negative for chest pain and leg swelling  Gastrointestinal: Positive for constipation (Occasional)  Negative for blood in stool, nausea and vomiting  Genitourinary: Negative for dysuria  Musculoskeletal: Positive for arthralgias and back pain  Negative for joint swelling  Skin: Positive for wound  Neurological: Positive for dizziness  Negative for seizures, syncope and headaches  Psychiatric/Behavioral: The patient is nervous/anxious  Objective:      Vitals:    11/01/18 1237   BP: 130/90   Pulse: 64   Resp: 14          Physical Exam   Constitutional: He is oriented to person, place, and time  He appears well-developed and well-nourished  HENT:   Head: Normocephalic and atraumatic  Eyes: Conjunctivae are normal  No scleral icterus  Neck: No thyromegaly present     Cardiovascular: Normal rate, regular rhythm and intact distal pulses  Pulmonary/Chest: No stridor  No respiratory distress  He has no wheezes  He has no rales  Abdominal: Soft  Musculoskeletal: He exhibits no edema or deformity  Lymphadenopathy:     He has no cervical adenopathy  Neurological: He is alert and oriented to person, place, and time  Skin: Skin is warm  He is not diaphoretic  Rash on upper torso, arms and bilater lower extrem, some with healing scabs, streaky excoriations consistent with Baker's nodules, no pustules or exudate    4-5 cm subcutaneous non adherent cystic lesion on left mid back, below scapula  Mobile, non tender, non reddened  Most consistent with lipoma      Lesion on Left ankle, skin intact, reddened   Psychiatric: His behavior is normal  Thought content normal

## 2018-11-02 ENCOUNTER — TELEPHONE (OUTPATIENT)
Dept: INTERNAL MEDICINE CLINIC | Facility: CLINIC | Age: 63
End: 2018-11-02

## 2018-11-02 NOTE — TELEPHONE ENCOUNTER
Rafael Rod called would like a call back her father in law and herself have questions about what dr Kathya Boyle told them on how to take the ALPRAZolam [facility])  It was written wrong and send to the pharmacy please call pt to correct this and re send to the pharmacy [facility]   Pleae advise pt

## 2018-11-02 NOTE — TELEPHONE ENCOUNTER
CALLED AND SPOKE TO [place] AND STATED SCRIPT IS WRONG AS IT WAS TOLD TO THEM  HE WAS TO TAKE 3MG FOR  A WEEK THEN TAPER DOWN-PLEASE ADVISE AS GAVE HER INSTRUCTIONS ON AFTER VISIT SUMMARY AND STATED THIS IS NOT CORRECT

## 2018-11-02 NOTE — TELEPHONE ENCOUNTER
I said he can take 3 tablets a day for the week, each tablet is 0 5 mg then he is to taper as directed

## 2018-11-13 DIAGNOSIS — E78.5 HYPERLIPIDEMIA, UNSPECIFIED HYPERLIPIDEMIA TYPE: ICD-10-CM

## 2018-11-13 DIAGNOSIS — I10 HYPERTENSION, UNSPECIFIED TYPE: ICD-10-CM

## 2018-11-13 DIAGNOSIS — K21.00 ESOPHAGITIS, REFLUX: ICD-10-CM

## 2018-11-13 RX ORDER — PRAVASTATIN SODIUM 20 MG
TABLET ORAL
Qty: 90 TABLET | Refills: 3 | Status: SHIPPED | OUTPATIENT
Start: 2018-11-13 | End: 2019-05-31 | Stop reason: SDUPTHER

## 2018-11-13 RX ORDER — ATENOLOL 100 MG/1
TABLET ORAL
Qty: 180 TABLET | Refills: 3 | Status: SHIPPED | OUTPATIENT
Start: 2018-11-13 | End: 2019-02-25 | Stop reason: SDUPTHER

## 2018-11-13 RX ORDER — PANTOPRAZOLE SODIUM 40 MG/1
TABLET, DELAYED RELEASE ORAL
Qty: 90 TABLET | Refills: 3 | Status: SHIPPED | OUTPATIENT
Start: 2018-11-13 | End: 2020-02-24

## 2018-11-30 ENCOUNTER — TRANSCRIBE ORDERS (OUTPATIENT)
Dept: LAB | Facility: CLINIC | Age: 63
End: 2018-11-30

## 2018-11-30 ENCOUNTER — OFFICE VISIT (OUTPATIENT)
Dept: INTERNAL MEDICINE CLINIC | Facility: CLINIC | Age: 63
End: 2018-11-30
Payer: MEDICARE

## 2018-11-30 ENCOUNTER — APPOINTMENT (OUTPATIENT)
Dept: LAB | Facility: CLINIC | Age: 63
End: 2018-11-30
Payer: MEDICARE

## 2018-11-30 VITALS
WEIGHT: 175.8 LBS | HEART RATE: 60 BPM | BODY MASS INDEX: 23.81 KG/M2 | RESPIRATION RATE: 14 BRPM | HEIGHT: 72 IN | SYSTOLIC BLOOD PRESSURE: 140 MMHG | DIASTOLIC BLOOD PRESSURE: 82 MMHG

## 2018-11-30 DIAGNOSIS — Z94.4 LIVER REPLACED BY TRANSPLANT (HCC): Primary | ICD-10-CM

## 2018-11-30 DIAGNOSIS — C22.1 MALIGNANT NEOPLASM OF INTRAHEPATIC BILE DUCTS (HCC): ICD-10-CM

## 2018-11-30 DIAGNOSIS — R79.1 ABNORMAL COAGULATION PROFILE: ICD-10-CM

## 2018-11-30 DIAGNOSIS — F41.9 ANXIETY: ICD-10-CM

## 2018-11-30 DIAGNOSIS — D68.9 BLOOD CLOTTING DISORDER (HCC): ICD-10-CM

## 2018-11-30 DIAGNOSIS — Z94.4 LIVER REPLACED BY TRANSPLANT (HCC): ICD-10-CM

## 2018-11-30 LAB
ALBUMIN SERPL BCP-MCNC: 4.1 G/DL (ref 3.5–5)
ALP SERPL-CCNC: 109 U/L (ref 46–116)
ALT SERPL W P-5'-P-CCNC: 44 U/L (ref 12–78)
ANION GAP SERPL CALCULATED.3IONS-SCNC: 6 MMOL/L (ref 4–13)
AST SERPL W P-5'-P-CCNC: 32 U/L (ref 5–45)
BASOPHILS # BLD AUTO: 0.06 THOUSANDS/ΜL (ref 0–0.1)
BASOPHILS NFR BLD AUTO: 1 % (ref 0–1)
BILIRUB SERPL-MCNC: 0.95 MG/DL (ref 0.2–1)
BUN SERPL-MCNC: 20 MG/DL (ref 5–25)
CALCIUM SERPL-MCNC: 8.9 MG/DL (ref 8.3–10.1)
CHLORIDE SERPL-SCNC: 99 MMOL/L (ref 100–108)
CO2 SERPL-SCNC: 28 MMOL/L (ref 21–32)
CREAT SERPL-MCNC: 1.03 MG/DL (ref 0.6–1.3)
EOSINOPHIL # BLD AUTO: 0.41 THOUSAND/ΜL (ref 0–0.61)
EOSINOPHIL NFR BLD AUTO: 6 % (ref 0–6)
ERYTHROCYTE [DISTWIDTH] IN BLOOD BY AUTOMATED COUNT: 12.7 % (ref 11.6–15.1)
GFR SERPL CREATININE-BSD FRML MDRD: 76 ML/MIN/1.73SQ M
GGT SERPL-CCNC: 469 U/L (ref 5–85)
GLUCOSE SERPL-MCNC: 126 MG/DL (ref 65–140)
HCT VFR BLD AUTO: 39.8 % (ref 36.5–49.3)
HGB BLD-MCNC: 13.2 G/DL (ref 12–17)
IMM GRANULOCYTES # BLD AUTO: 0.02 THOUSAND/UL (ref 0–0.2)
IMM GRANULOCYTES NFR BLD AUTO: 0 % (ref 0–2)
INR PPP: 1.16 (ref 0.86–1.17)
LYMPHOCYTES # BLD AUTO: 1.88 THOUSANDS/ΜL (ref 0.6–4.47)
LYMPHOCYTES NFR BLD AUTO: 27 % (ref 14–44)
MAGNESIUM SERPL-MCNC: 2.1 MG/DL (ref 1.6–2.6)
MCH RBC QN AUTO: 32.8 PG (ref 26.8–34.3)
MCHC RBC AUTO-ENTMCNC: 33.2 G/DL (ref 31.4–37.4)
MCV RBC AUTO: 99 FL (ref 82–98)
MONOCYTES # BLD AUTO: 0.43 THOUSAND/ΜL (ref 0.17–1.22)
MONOCYTES NFR BLD AUTO: 6 % (ref 4–12)
NEUTROPHILS # BLD AUTO: 4.05 THOUSANDS/ΜL (ref 1.85–7.62)
NEUTS SEG NFR BLD AUTO: 60 % (ref 43–75)
NRBC BLD AUTO-RTO: 0 /100 WBCS
PLATELET # BLD AUTO: 168 THOUSANDS/UL (ref 149–390)
PMV BLD AUTO: 10.9 FL (ref 8.9–12.7)
POTASSIUM SERPL-SCNC: 4.7 MMOL/L (ref 3.5–5.3)
PROT SERPL-MCNC: 8.3 G/DL (ref 6.4–8.2)
PROTHROMBIN TIME: 14.9 SECONDS (ref 11.8–14.2)
RBC # BLD AUTO: 4.03 MILLION/UL (ref 3.88–5.62)
SODIUM SERPL-SCNC: 133 MMOL/L (ref 136–145)
TACROLIMUS BLD-MCNC: 6.5 NG/ML (ref 2–20)
WBC # BLD AUTO: 6.85 THOUSAND/UL (ref 4.31–10.16)

## 2018-11-30 PROCEDURE — 80053 COMPREHEN METABOLIC PANEL: CPT

## 2018-11-30 PROCEDURE — 85025 COMPLETE CBC W/AUTO DIFF WBC: CPT

## 2018-11-30 PROCEDURE — 85610 PROTHROMBIN TIME: CPT

## 2018-11-30 PROCEDURE — 36415 COLL VENOUS BLD VENIPUNCTURE: CPT

## 2018-11-30 PROCEDURE — 80197 ASSAY OF TACROLIMUS: CPT

## 2018-11-30 PROCEDURE — 82977 ASSAY OF GGT: CPT

## 2018-11-30 PROCEDURE — 99213 OFFICE O/P EST LOW 20 MIN: CPT | Performed by: INTERNAL MEDICINE

## 2018-11-30 PROCEDURE — 83735 ASSAY OF MAGNESIUM: CPT

## 2018-11-30 RX ORDER — ALPRAZOLAM 0.25 MG/1
0.25 TABLET ORAL 3 TIMES DAILY PRN
Qty: 75 TABLET | Refills: 0 | Status: SHIPPED | OUTPATIENT
Start: 2018-11-30 | End: 2018-12-28 | Stop reason: SDUPTHER

## 2018-11-30 NOTE — PATIENT INSTRUCTIONS
Anxiety with long-term narcotic use-we re-emphasized the importance of weaning off of the benzodiazepines  We discussed starting on SSRI to help control his predominant symptoms of anxiety  Will start sertraline 25 mg daily for 2 weeks then increase to 50 mg daily  Risks, benefits, side effects and expectations discussed  Refill Xanax 0 25 mg tablets to continue with tapering regimen  Patient understands the goal to taper 10% per week      History of tobacco abuse-schedule the lung screening when you can afford it    Follow-up in 1 month, sooner as needed

## 2018-11-30 NOTE — PROGRESS NOTES
Assessment/Plan:    There are no Patient Instructions on file for this visit  There are no diagnoses linked to this encounter  Subjective:      Patient ID: Amada Huang is a 70 y o  male    Pt feels we are tapering his xanax too quickly  He states he misunderstood the instructions  He has been using the xanax 1/2 of a 0 5 mg tab prn for palpitations and panic attacks  Sometimes he needs the other 0 25 in 45 min if sx don't resolve  No more than 1 mg per day  He still has 5 1/2 0 5 mg pills left    He wasn't able to afford the $49 lung screening scan  Current Outpatient Prescriptions:     ALPRAZolam (XANAX) 0 5 mg tablet, Take 1 tablet (0 5 mg total) by mouth 3 (three) times a day as needed for anxiety, Disp: 90 tablet, Rfl: 0    aspirin 81 MG tablet, Take 1 tablet by mouth daily, Disp: , Rfl:     atenolol (TENORMIN) 100 mg tablet, TAKE 1 TABLET BY MOUTH TWO  TIMES DAILY, Disp: 180 tablet, Rfl: 3    Calcium Carb-Cholecalciferol 1000-800 MG-UNIT TABS, Take 1 tablet by mouth, Disp: , Rfl:     magnesium oxide (MAG-OX) 400 mg, Take 1 tablet (400 mg total) by mouth 2 (two) times a day, Disp: 60 tablet, Rfl: 0    oxyCODONE (OXYCONTIN) 40 mg 12 hr tablet, Take by mouth, Disp: , Rfl:     oxyCODONE (ROXICODONE) 10 MG TABS, , Disp: , Rfl:     pantoprazole (PROTONIX) 40 mg tablet, TAKE 1 TABLET BY MOUTH  DAILY, Disp: 90 tablet, Rfl: 3    pravastatin (PRAVACHOL) 20 mg tablet, TAKE 1 TABLET BY MOUTH  DAILY, Disp: 90 tablet, Rfl: 3    Resveratrol 50 MG CAPS, Take by mouth, Disp: , Rfl:     Sennosides 15 MG TABS, Take by mouth, Disp: , Rfl:     tacrolimus (PROGRAF) 1 mg capsule, Take 2 mg by mouth 2 (two) times a day, Disp: , Rfl:     No results found for this or any previous visit (from the past 1008 hour(s))      The following portions of the patient's history were reviewed and updated as appropriate: allergies, current medications, past family history, past medical history, past social history, past surgical history and problem list      Review of Systems   Constitutional: Negative for appetite change, chills, diaphoresis, fatigue, fever and unexpected weight change  HENT: Negative for congestion, hearing loss and rhinorrhea  Eyes: Negative for visual disturbance  Respiratory: Negative for cough, chest tightness, shortness of breath and wheezing  Cardiovascular: Positive for palpitations  Negative for chest pain and leg swelling  Gastrointestinal: Negative for abdominal pain and blood in stool  Endocrine: Negative for cold intolerance, heat intolerance, polydipsia and polyuria  Genitourinary: Negative for difficulty urinating, dysuria, frequency and urgency  Musculoskeletal: Negative for arthralgias and myalgias  Skin: Negative for rash  Neurological: Negative for dizziness, weakness, light-headedness and headaches  Hematological: Does not bruise/bleed easily  Psychiatric/Behavioral: Negative for dysphoric mood and sleep disturbance  The patient is nervous/anxious  Objective:      Vitals:    11/30/18 0944   BP: 140/82   Pulse: 60   Resp: 14          Physical Exam   Constitutional: He is oriented to person, place, and time  He appears well-developed and well-nourished  HENT:   Head: Normocephalic and atraumatic  Nose: Nose normal    Mouth/Throat: Oropharynx is clear and moist    Eyes: Pupils are equal, round, and reactive to light  Conjunctivae and EOM are normal  No scleral icterus  Neck: Normal range of motion  Neck supple  No JVD present  No tracheal deviation present  No thyromegaly present  Cardiovascular: Normal rate, regular rhythm and intact distal pulses  Exam reveals no gallop and no friction rub  No murmur heard  Pulmonary/Chest: Effort normal and breath sounds normal  No respiratory distress  He has no wheezes  He has no rales  Musculoskeletal: He exhibits no edema or deformity  Lymphadenopathy:     He has no cervical adenopathy  Neurological: He is alert and oriented to person, place, and time  No cranial nerve deficit  Skin: Skin is warm and dry  No rash noted  No erythema  No pallor  Psychiatric: He has a normal mood and affect   His behavior is normal  Judgment and thought content normal

## 2018-12-28 ENCOUNTER — OFFICE VISIT (OUTPATIENT)
Dept: INTERNAL MEDICINE CLINIC | Facility: CLINIC | Age: 63
End: 2018-12-28
Payer: MEDICARE

## 2018-12-28 VITALS
DIASTOLIC BLOOD PRESSURE: 84 MMHG | WEIGHT: 168.2 LBS | HEIGHT: 72 IN | RESPIRATION RATE: 16 BRPM | HEART RATE: 64 BPM | SYSTOLIC BLOOD PRESSURE: 142 MMHG | BODY MASS INDEX: 22.78 KG/M2

## 2018-12-28 DIAGNOSIS — F41.9 ANXIETY: ICD-10-CM

## 2018-12-28 PROCEDURE — 99213 OFFICE O/P EST LOW 20 MIN: CPT | Performed by: NURSE PRACTITIONER

## 2018-12-28 RX ORDER — ALPRAZOLAM 0.25 MG/1
0.25 TABLET ORAL 3 TIMES DAILY PRN
Qty: 80 TABLET | Refills: 0 | Status: SHIPPED | OUTPATIENT
Start: 2018-12-28 | End: 2019-01-22 | Stop reason: SDUPTHER

## 2018-12-28 NOTE — PROGRESS NOTES
Assessment/Plan:    Patient Instructions   Anxiety with long-term use of benzodiazepine  He has had a very difficult time trying to wean off of his Xanax most of it was miscommunication in the very beginning where he dropped down from 6 mg a day very abruptly to 3 mg and then 1 mg all and a matter of a 10 day time period  He is currently taking a 0 25 mg tablet taking 1/2 in the morning occasionally a 1/2 tablet in the afternoon and then 2 tablets at nighttime  He states that he skips the afternoon dose just to have extra in case he needs it  He continues to have palpitations stomach cramping and tremors  He is more frightened by these physical symptoms than anything which triggers his anxiety  At this time we will not make any significant changes to his Xanax  I do recommend taking 1/2 tablet in the morning 1/2 tablet consistently in the afternoon and 2 tablets at bedtime for the next 10 days then I recommend going to 1/2 tablet in the morning 1/4 tablet in the afternoon and continue 2 tablets at bedtime  Follow-up after this point  We will also increase Zoloft to 75 mg  Rash continue moisturizer and steroid cream         Diagnoses and all orders for this visit:    Anxiety  -     sertraline (ZOLOFT) 50 mg tablet; Take 1 1/2 tab daily  -     ALPRAZolam (XANAX) 0 25 mg tablet; Take 1 tablet (0 25 mg total) by mouth 3 (three) times a day as needed for anxiety         Subjective:      Patient ID: Jim Chou is a 70 y o  male    Patient is here to follow up weaning off of benzodiazepine  He states that he admits that in the very beginning of the process he messed up and weaned himself too quickly  He has been taking 6 mg of Xanax for many years  Then he abruptly in a 10 day time frame went down to a total of 1 mg daily  He is currently taking a 0 25 mg tablet he is taking 1/2 in the morning occasionally a 1/2 in the afternoon and then 2 tablets at bedtime    He admits that he skips the afternoon dose just so he has a little extra in case he has anxiety  He feels physical symptoms more so than his mental anxiety he is feeling palpitations abdominal cramping and tremor and it is the symptoms that actually trigger his anxiety  He feels like he is going to die    He is taking his Zoloft in the morning and has not noticed a difference with this medication yet          Current Outpatient Prescriptions:     ALPRAZolam (XANAX) 0 25 mg tablet, Take 1 tablet (0 25 mg total) by mouth 3 (three) times a day as needed for anxiety, Disp: 80 tablet, Rfl: 0    aspirin 81 MG tablet, Take 1 tablet by mouth daily, Disp: , Rfl:     atenolol (TENORMIN) 100 mg tablet, TAKE 1 TABLET BY MOUTH TWO  TIMES DAILY, Disp: 180 tablet, Rfl: 3    Calcium Carb-Cholecalciferol 1000-800 MG-UNIT TABS, Take 1 tablet by mouth, Disp: , Rfl:     magnesium oxide (MAG-OX) 400 mg, Take 1 tablet (400 mg total) by mouth 2 (two) times a day, Disp: 60 tablet, Rfl: 0    oxyCODONE (OXYCONTIN) 40 mg 12 hr tablet, Take by mouth, Disp: , Rfl:     oxyCODONE (ROXICODONE) 10 MG TABS, , Disp: , Rfl:     pantoprazole (PROTONIX) 40 mg tablet, TAKE 1 TABLET BY MOUTH  DAILY, Disp: 90 tablet, Rfl: 3    pravastatin (PRAVACHOL) 20 mg tablet, TAKE 1 TABLET BY MOUTH  DAILY, Disp: 90 tablet, Rfl: 3    Resveratrol 50 MG CAPS, Take by mouth, Disp: , Rfl:     Sennosides 15 MG TABS, Take by mouth, Disp: , Rfl:     sertraline (ZOLOFT) 50 mg tablet, Take 1 1/2 tab daily, Disp: 45 tablet, Rfl: 0    tacrolimus (PROGRAF) 1 mg capsule, Take 2 mg by mouth 2 (two) times a day, Disp: , Rfl:     Recent Results (from the past 1008 hour(s))   Comprehensive metabolic panel    Collection Time: 11/30/18 10:54 AM   Result Value Ref Range    Sodium 133 (L) 136 - 145 mmol/L    Potassium 4 7 3 5 - 5 3 mmol/L    Chloride 99 (L) 100 - 108 mmol/L    CO2 28 21 - 32 mmol/L    ANION GAP 6 4 - 13 mmol/L    BUN 20 5 - 25 mg/dL    Creatinine 1 03 0 60 - 1 30 mg/dL    Glucose 126 65 - 140 mg/dL Calcium 8 9 8 3 - 10 1 mg/dL    AST 32 5 - 45 U/L    ALT 44 12 - 78 U/L    Alkaline Phosphatase 109 46 - 116 U/L    Total Protein 8 3 (H) 6 4 - 8 2 g/dL    Albumin 4 1 3 5 - 5 0 g/dL    Total Bilirubin 0 95 0 20 - 1 00 mg/dL    eGFR 76 ml/min/1 73sq m   CBC and differential    Collection Time: 11/30/18 10:54 AM   Result Value Ref Range    WBC 6 85 4 31 - 10 16 Thousand/uL    RBC 4 03 3 88 - 5 62 Million/uL    Hemoglobin 13 2 12 0 - 17 0 g/dL    Hematocrit 39 8 36 5 - 49 3 %    MCV 99 (H) 82 - 98 fL    MCH 32 8 26 8 - 34 3 pg    MCHC 33 2 31 4 - 37 4 g/dL    RDW 12 7 11 6 - 15 1 %    MPV 10 9 8 9 - 12 7 fL    Platelets 403 212 - 105 Thousands/uL    nRBC 0 /100 WBCs    Neutrophils Relative 60 43 - 75 %    Immat GRANS % 0 0 - 2 %    Lymphocytes Relative 27 14 - 44 %    Monocytes Relative 6 4 - 12 %    Eosinophils Relative 6 0 - 6 %    Basophils Relative 1 0 - 1 %    Neutrophils Absolute 4 05 1 85 - 7 62 Thousands/µL    Immature Grans Absolute 0 02 0 00 - 0 20 Thousand/uL    Lymphocytes Absolute 1 88 0 60 - 4 47 Thousands/µL    Monocytes Absolute 0 43 0 17 - 1 22 Thousand/µL    Eosinophils Absolute 0 41 0 00 - 0 61 Thousand/µL    Basophils Absolute 0 06 0 00 - 0 10 Thousands/µL   Magnesium    Collection Time: 11/30/18 10:54 AM   Result Value Ref Range    Magnesium 2 1 1 6 - 2 6 mg/dL   Protime-INR    Collection Time: 11/30/18 10:54 AM   Result Value Ref Range    Protime 14 9 (H) 11 8 - 14 2 seconds    INR 1 16 0 86 - 1 17   Gamma GT    Collection Time: 11/30/18 10:54 AM   Result Value Ref Range     (H) 5 - 85 U/L   Tacrolimus level    Collection Time: 11/30/18 10:54 AM   Result Value Ref Range    TACROLIMUS 6 5 2 0 - 20 0 ng/mL       The following portions of the patient's history were reviewed and updated as appropriate: allergies, current medications, past family history, past medical history, past social history, past surgical history and problem list      Review of Systems   Constitutional: Positive for diaphoresis  Negative for appetite change, chills, fatigue, fever and unexpected weight change  HENT: Negative for postnasal drip and sneezing  Eyes: Negative for visual disturbance  Respiratory: Negative for chest tightness and shortness of breath  Cardiovascular: Negative for chest pain, palpitations and leg swelling  Gastrointestinal: Positive for abdominal pain  Negative for blood in stool  Endocrine: Negative for cold intolerance, heat intolerance, polydipsia, polyphagia and polyuria  Genitourinary: Negative for difficulty urinating, dysuria, frequency and urgency  Musculoskeletal: Negative for arthralgias and myalgias  Skin: Negative for rash and wound  Neurological: Positive for tremors  Negative for dizziness, weakness, light-headedness and headaches  Hematological: Negative for adenopathy  Psychiatric/Behavioral: Negative for confusion, dysphoric mood and sleep disturbance  The patient is not nervous/anxious  Objective:      /84 (BP Location: Left arm, Patient Position: Sitting, Cuff Size: Standard)   Pulse 64   Resp 16   Ht 6' (1 829 m)   Wt 76 3 kg (168 lb 3 2 oz)   BMI 22 81 kg/m²        Physical Exam   Constitutional: He is oriented to person, place, and time  He appears well-developed and well-nourished  HENT:   Head: Normocephalic and atraumatic  Nose: Nose normal    Mouth/Throat: Oropharynx is clear and moist    Eyes: Pupils are equal, round, and reactive to light  Conjunctivae and EOM are normal  No scleral icterus  Neck: Normal range of motion  Neck supple  No JVD present  No tracheal deviation present  No thyromegaly present  Cardiovascular: Normal rate, regular rhythm and intact distal pulses  Exam reveals no gallop and no friction rub  No murmur heard  Pulmonary/Chest: Effort normal and breath sounds normal  No respiratory distress  He has no wheezes  He has no rales  Musculoskeletal: He exhibits no edema or deformity  Lymphadenopathy:     He has no cervical adenopathy  Neurological: He is alert and oriented to person, place, and time  No cranial nerve deficit  Skin: Skin is warm  Rash noted  He is diaphoretic  No erythema  No pallor  Psychiatric: He has a normal mood and affect   His behavior is normal  Judgment and thought content normal

## 2018-12-28 NOTE — PATIENT INSTRUCTIONS
Anxiety with long-term use of benzodiazepine  He has had a very difficult time trying to wean off of his Xanax most of it was miscommunication in the very beginning where he dropped down from 6 mg a day very abruptly to 3 mg and then 1 mg all and a matter of a 10 day time period  He is currently taking a 0 25 mg tablet taking 1/2 in the morning occasionally a 1/2 tablet in the afternoon and then 2 tablets at nighttime  He states that he skips the afternoon dose just to have extra in case he needs it  He continues to have palpitations stomach cramping and tremors  He is more frightened by these physical symptoms than anything which triggers his anxiety  At this time we will not make any significant changes to his Xanax  I do recommend taking 1/2 tablet in the morning 1/2 tablet consistently in the afternoon and 2 tablets at bedtime for the next 10 days then I recommend going to 1/2 tablet in the morning 1/4 tablet in the afternoon and continue 2 tablets at bedtime  Follow-up after this point  We will also increase Zoloft to 75 mg      Rash continue moisturizer and steroid cream

## 2019-01-22 ENCOUNTER — OFFICE VISIT (OUTPATIENT)
Dept: INTERNAL MEDICINE CLINIC | Facility: CLINIC | Age: 64
End: 2019-01-22
Payer: MEDICARE

## 2019-01-22 VITALS
RESPIRATION RATE: 16 BRPM | SYSTOLIC BLOOD PRESSURE: 128 MMHG | HEART RATE: 72 BPM | HEIGHT: 72 IN | DIASTOLIC BLOOD PRESSURE: 82 MMHG | BODY MASS INDEX: 23.16 KG/M2 | WEIGHT: 171 LBS

## 2019-01-22 DIAGNOSIS — F41.9 ANXIETY: ICD-10-CM

## 2019-01-22 DIAGNOSIS — R21 RASH: ICD-10-CM

## 2019-01-22 DIAGNOSIS — K46.9 ABDOMINAL HERNIA WITHOUT OBSTRUCTION AND WITHOUT GANGRENE, RECURRENCE NOT SPECIFIED, UNSPECIFIED HERNIA TYPE: Primary | ICD-10-CM

## 2019-01-22 PROCEDURE — 99214 OFFICE O/P EST MOD 30 MIN: CPT | Performed by: NURSE PRACTITIONER

## 2019-01-22 PROCEDURE — G0439 PPPS, SUBSEQ VISIT: HCPCS | Performed by: NURSE PRACTITIONER

## 2019-01-22 RX ORDER — SERTRALINE HYDROCHLORIDE 100 MG/1
TABLET, FILM COATED ORAL
Qty: 60 TABLET | Refills: 1 | Status: SHIPPED | OUTPATIENT
Start: 2019-01-22 | End: 2019-04-10 | Stop reason: SDUPTHER

## 2019-01-22 RX ORDER — TRIAMCINOLONE ACETONIDE 5 MG/G
OINTMENT TOPICAL 2 TIMES DAILY
Qty: 30 G | Refills: 0 | Status: SHIPPED | OUTPATIENT
Start: 2019-01-22 | End: 2019-02-18 | Stop reason: SDUPTHER

## 2019-01-22 RX ORDER — ALPRAZOLAM 0.25 MG/1
TABLET ORAL
Qty: 60 TABLET | Refills: 0 | Status: SHIPPED | OUTPATIENT
Start: 2019-01-22 | End: 2019-02-11 | Stop reason: SDUPTHER

## 2019-01-22 NOTE — PATIENT INSTRUCTIONS
Rash I stated that this is eczema/a type of dermatitis  It is likely medication related or psychosomatic  I recommend a good moisturizer to the back and legs mixed with triamcinolone cream twice a day for the next 7-10 days  Okay to continue Benadryl as needed  If itch is still terribly intense contact me could consider hydroxyzine    Anxiety we are trying to wean him off Xanax he is currently taking 1/2 tablet in the morning and 1-1/2 to 2 tablets at bedtime  He brings with him today at his visit his bottle of Xanax which shows he still has 9 remaining from his prescription he still feels he has a tremendous anxiety he is currently taking Zoloft 100 mg at this time we will increase to 150 of Zoloft continue 1/2 tablet of a 0 25 mg Xanax in the morning and 1-1/2 to 2 tablets at bedtime for the next 3 weeks follow-up at that time at which we will wean further      Hernia check CT scan to evaluate further    Chronic pain following with pain management

## 2019-01-22 NOTE — PROGRESS NOTES
Assessment/Plan:    Patient Instructions   Rash I stated that this is eczema/a type of dermatitis  It is likely medication related or psychosomatic  I recommend a good moisturizer to the back and legs mixed with triamcinolone cream twice a day for the next 7-10 days  Okay to continue Benadryl as needed  If itch is still terribly intense contact me could consider hydroxyzine    Anxiety we are trying to wean him off Xanax he is currently taking 1/2 tablet in the morning and 1-1/2 to 2 tablets at bedtime  He brings with him today at his visit his bottle of Xanax which shows he still has 9 remaining from his prescription he still feels he has a tremendous anxiety he is currently taking Zoloft 100 mg at this time we will increase to 150 of Zoloft continue 1/2 tablet of a 0 25 mg Xanax in the morning and 1-1/2 to 2 tablets at bedtime for the next 3 weeks follow-up at that time at which we will wean further  Hernia check CT scan to evaluate further    Chronic pain following with pain management         Diagnoses and all orders for this visit:    Abdominal hernia without obstruction and without gangrene, recurrence not specified, unspecified hernia type  -     CT abdomen pelvis wo contrast; Future    Anxiety  -     ALPRAZolam (XANAX) 0 25 mg tablet; Take 1/2 tab in the am and 1 1/2 tab at bedtime  -     sertraline (ZOLOFT) 100 mg tablet; Take 1 1/2 tab daily    Rash  -     triamcinolone (KENALOG) 0 5 % ointment; Apply topically 2 (two) times a day         Subjective:      Patient ID: Jose R Govea is a 75 y o  male    Patient comes in today with his daughter in law  Her  was just recently diagnosed with shingles and Christina Mackey has had a rash on his back and legs for several months and she is concerned  He states everything is just very itchy and he uses whatever he can to itchy areas    Now there is scaling and irritation to the skin  As far as his Xanax he is taking 1/2 of a tablet in the morning he is not taking 1 in the afternoon and sometimes taking 1/2 to 2 or more in the evening  He states he has a lot of anxiety at nighttime  He increase his Zoloft to 100 mg still feeling quite anxious  Patient has had multiple abdominal surgeries he has history of a mesh he feels as this he has a hernia protruding through the mesh his abdominal pain occasional nausea vomiting no fevers or chills no blood in stool          Current Outpatient Prescriptions:     ALPRAZolam (XANAX) 0 25 mg tablet, Take 1/2 tab in the am and 1 1/2 tab at bedtime, Disp: 60 tablet, Rfl: 0    aspirin 81 MG tablet, Take 1 tablet by mouth daily, Disp: , Rfl:     atenolol (TENORMIN) 100 mg tablet, TAKE 1 TABLET BY MOUTH TWO  TIMES DAILY, Disp: 180 tablet, Rfl: 3    Calcium Carb-Cholecalciferol 1000-800 MG-UNIT TABS, Take 1 tablet by mouth, Disp: , Rfl:     magnesium oxide (MAG-OX) 400 mg, Take 1 tablet (400 mg total) by mouth 2 (two) times a day, Disp: 60 tablet, Rfl: 0    oxyCODONE (OXYCONTIN) 40 mg 12 hr tablet, Take by mouth, Disp: , Rfl:     oxyCODONE (ROXICODONE) 10 MG TABS, , Disp: , Rfl:     pantoprazole (PROTONIX) 40 mg tablet, TAKE 1 TABLET BY MOUTH  DAILY, Disp: 90 tablet, Rfl: 3    pravastatin (PRAVACHOL) 20 mg tablet, TAKE 1 TABLET BY MOUTH  DAILY, Disp: 90 tablet, Rfl: 3    Resveratrol 50 MG CAPS, Take by mouth, Disp: , Rfl:     sertraline (ZOLOFT) 100 mg tablet, Take 1 1/2 tab daily, Disp: 60 tablet, Rfl: 1    tacrolimus (PROGRAF) 1 mg capsule, Take 2 mg by mouth 2 (two) times a day, Disp: , Rfl:     Sennosides 15 MG TABS, Take by mouth, Disp: , Rfl:     triamcinolone (KENALOG) 0 5 % ointment, Apply topically 2 (two) times a day, Disp: 30 g, Rfl: 0    No results found for this or any previous visit (from the past 1008 hour(s))      The following portions of the patient's history were reviewed and updated as appropriate: allergies, current medications, past family history, past medical history, past social history, past surgical history and problem list      Review of Systems   Constitutional: Positive for appetite change and fatigue  Negative for chills, diaphoresis, fever and unexpected weight change  HENT: Negative for postnasal drip and sneezing  Eyes: Negative for visual disturbance  Respiratory: Negative for chest tightness and shortness of breath  Cardiovascular: Negative for chest pain, palpitations and leg swelling  Gastrointestinal: Positive for abdominal pain  Negative for blood in stool  Endocrine: Negative for cold intolerance, heat intolerance, polydipsia, polyphagia and polyuria  Genitourinary: Negative for difficulty urinating, dysuria, frequency and urgency  Musculoskeletal: Negative for arthralgias and myalgias  Skin: Positive for rash  Negative for wound  Neurological: Negative for dizziness, weakness, light-headedness and headaches  Hematological: Negative for adenopathy  Psychiatric/Behavioral: Negative for confusion, dysphoric mood and sleep disturbance  The patient is nervous/anxious  Objective:      /82 (BP Location: Left arm, Patient Position: Sitting, Cuff Size: Standard)   Pulse 72   Resp 16   Ht 6' (1 829 m)   Wt 77 6 kg (171 lb)   BMI 23 19 kg/m²        Physical Exam   Constitutional: He is oriented to person, place, and time  He appears well-developed and well-nourished  HENT:   Head: Normocephalic and atraumatic  Nose: Nose normal    Mouth/Throat: Oropharynx is clear and moist    Eyes: Pupils are equal, round, and reactive to light  Conjunctivae and EOM are normal  No scleral icterus  Neck: Normal range of motion  Neck supple  No JVD present  No tracheal deviation present  No thyromegaly present  Cardiovascular: Normal rate, regular rhythm and intact distal pulses  Exam reveals no gallop and no friction rub  No murmur heard  Pulmonary/Chest: Effort normal and breath sounds normal  No respiratory distress  He has no wheezes  He has no rales  Abdominal:   Multiple scarring noted with a prominent umbilical/ventral hernia   Musculoskeletal: He exhibits no edema or deformity  Lymphadenopathy:     He has no cervical adenopathy  Neurological: He is alert and oriented to person, place, and time  No cranial nerve deficit  Skin: Skin is warm  Rash noted  He is diaphoretic  No erythema  No pallor  Scaling erythematous rash with lichenfication of the skin noted to the right back extending up to the shoulder and to the ankles scattered erythematous papules to the buttocks   Psychiatric: He has a normal mood and affect   His behavior is normal  Judgment and thought content normal

## 2019-01-22 NOTE — PROGRESS NOTES
Assessment and Plan:    Problem List Items Addressed This Visit     Anxiety    Relevant Medications    ALPRAZolam (XANAX) 0 25 mg tablet    sertraline (ZOLOFT) 100 mg tablet      Other Visit Diagnoses     Abdominal hernia without obstruction and without gangrene, recurrence not specified, unspecified hernia type    -  Primary    Relevant Orders    CT abdomen pelvis wo contrast    Rash        Relevant Medications    triamcinolone (KENALOG) 0 5 % ointment        Health Maintenance Due   Topic Date Due    Hepatitis C Screening  1955    Medicare Annual Wellness Visit (AWV)  1955    DTaP,Tdap,and Td Vaccines (2 - Td) 11/17/2014    INFLUENZA VACCINE  07/01/2018         HPI:  Jose R Govea is a 75 y o  male here for his Subsequent Wellness Visit      Patient Active Problem List   Diagnosis    Anxiety    Arteriosclerosis of coronary artery    Chronic obstructive pulmonary disease (HCC)    Esophagitis, reflux    Hyperlipidemia    Hypertension    [facility]ression [facility]    Primary osteoarthritis of left knee    Liver transplanted ([facility]    Impaired fasting glucose    Idiopathic peripheral neuropathy    Tobacco abuse     Past Medical History:   Diagnosis Date    Abnormal electrocardiogram     Abnormal findings on radiological examination of gastrointestinal tract     Abnormal liver enzymes     Acute hepatitis C     Adrenal disorder (HCC)     Aneurysm of unspecified site ([facility]     Benign neoplasm of skin     Bronchopneumonia     Chronic hepatitis C (HCC)     Chronic obstructive asthma (HCC)     Cirrhosis (HCC)     Colon, diverticulosis     COPD (chronic obstructive pulmonary disease) ([address]     Depression     Diabetes mellitus ([address]     Drug dependence, continuous abuse ([facility]     Hyperlipidemia     Hypertension     Laennec's cirrhosis (alcoholic) (HCC)     Malabsorption syndrome     Mitral valve disorder     Murmur     Nonspecific abnormal finding on cardiac evaluation     Peripheral vascular disease (HCC)     Pleural effusion     Pneumonia     Rectal hemorrhage     Renal failure     Respiratory abnormality     Restrictive lung disease     lung disease other not elsewhere class     Testicular dysfunction     testicular hypfunction other     Tricuspid valve disorders, non-rheumatic     Type 2 diabetes mellitus (HCC)     uncomplicated controlled      Ventral hernia      Past Surgical History:   Procedure Laterality Date    CHOLECYSTECTOMY      GALLBLADDER SURGERY      HEPATITIS B SURFACE AB QN,LIVER TRANSPLANT (HISTORICAL)      HERNIA REPAIR       Family History   Problem Relation Age of Onset    Lung cancer Mother         overlapping sites of lung unspecified laterality     Heart disease Father         cardiac disorder    Arthritis Family     Cancer Family     Liver disease Family         chronic    Coronary artery disease Family     Diabetes Family      History   Smoking Status    Current Every Day Smoker    Packs/day: 0 25    Years: 40 00   Smokeless Tobacco    Never Used     Comment: tobacco use      History   Alcohol Use No     Comment: no alcohol use       History   Drug Use No       Current Outpatient Prescriptions   Medication Sig Dispense Refill    ALPRAZolam (XANAX) 0 25 mg tablet Take 1/2 tab in the am and 1 1/2 tab at bedtime 60 tablet 0    aspirin 81 MG tablet Take 1 tablet by mouth daily      atenolol (TENORMIN) 100 mg tablet TAKE 1 TABLET BY MOUTH TWO  TIMES DAILY 180 tablet 3    Calcium Carb-Cholecalciferol 1000-800 MG-UNIT TABS Take 1 tablet by mouth      magnesium oxide (MAG-OX) 400 mg Take 1 tablet (400 mg total) by mouth 2 (two) times a day 60 tablet 0    oxyCODONE (OXYCONTIN) 40 mg 12 hr tablet Take by mouth      oxyCODONE (ROXICODONE) 10 MG TABS       pantoprazole (PROTONIX) 40 mg tablet TAKE 1 TABLET BY MOUTH  DAILY 90 tablet 3    pravastatin (PRAVACHOL) 20 mg tablet TAKE 1 TABLET BY MOUTH  DAILY 90 tablet 3    Resveratrol 50 MG CAPS Take by mouth      sertraline (ZOLOFT) 100 mg tablet Take 1 1/2 tab daily 60 tablet 1    tacrolimus (PROGRAF) 1 mg capsule Take 2 mg by mouth 2 (two) times a day      Sennosides 15 MG TABS Take by mouth      triamcinolone (KENALOG) 0 5 % ointment Apply topically 2 (two) times a day 30 g 0     No current facility-administered medications for this visit  Allergies   Allergen Reactions    Other      Muscle relaxer's causes vomiting and nausea     Grass Extracts [Gramineae Pollens] Hives and Sneezing    Pollen Extract Hives and Sneezing     Watery Eyes    Prozac [Fluoxetine]      Immunization History   Administered Date(s) Administered    Hep A, adult 09/13/2002    Hep B, adult 09/13/2002    Influenza 10/17/2011, 11/17/2015    Influenza Quadrivalent, 6-35 Months IM 10/21/2014, 11/17/2015    Pneumococcal Conjugate 13-Valent 05/02/2015    Pneumococcal Polysaccharide PPV23 09/13/2002, 07/08/2018    Tdap 11/17/2004       Patient Care Team:  Maribel Flower MD as PCP - General  JOSE Harmon MD Larry Achilles, MD    Medicare Screening Tests and Risk Assessments:  Carmen Lester is here for his Initial Wellness visit  Last Medicare Wellness visit information reviewed, patient interviewed and updates made to the record today  Health Risk Assessment:  Patient rates overall health as fair  Patient feels that their physical health rating is Slightly worse  Eyesight was rated as Much worse  Hearing was rated as Slightly worse  Patient feels that their emotional and mental health rating is Same  Pain experienced by patient in the last 7 days has been A lot  Patient's pain rating has been 9/10  Emotional/Mental Health:  Patient has been feeling nervous/anxious  PHQ-9 Depression Screening:    Frequency of the following problems over the past two weeks:      1  Little interest or pleasure in doing things: 0 - not at all      2   Feeling down, depressed, or hopeless: 0 - not at all  PHQ-2 Score: 0          Broken Bones/Falls: Fall Risk Assessment:    In the past year, patient has experienced: History of falling in past year          Bladder/Bowel:  Patient has not leaked urine accidently in the last six months  Patient reports no loss of bowel control  Immunizations:  Patient has had a flu vaccination within the last year  Patient has received a pneumonia shot  Patient has not received a shingles shot  Home Safety:  Patient has trouble with stairs inside or outside of their home  Patient currently reports that there are no safety hazards present in home, working smoke alarms, working carbon monoxide detectors  Preventative Screenings:   no prostate cancer screen performed, colon cancer screen completed, cholesterol screen completed, no glaucoma eye exam completed    Nutrition:  Current diet: Regular, No Added Salt and Limited junk food with servings of the following:    Medications:  Patient is currently taking over-the-counter supplements  Patient is able to manage medications  Lifestyle Choices:  Patient reports current tobacco use  Patient reports no alcohol use  Patient does not drive a vehicle  Patient wears seat belt  Current level of exercise of physical activity described by patient as: hardly none to much pain  Activities of Daily Living:  Can get out of bed by his or her self, able to dress self, able to make own meals, able to do own shopping, able to bathe self, can do own laundry/housekeeping, can manage own money, pay bills and track expenses    Previous Hospitalizations:  No hospitalization or ED visit in past 12 months        Advanced Directives:  Patient has decided on a power of   Patient has spoken to designated power of   Patient has completed advanced directive    Additional Comments: [phone] Home   [phone] daughter in law cell  [phone] cell    Preventative Screening/Counseling:      Cardiovascular: General: Screening Current          Diabetes:      General: Screening Current          Colorectal Cancer:      General: Screening Current          Prostate Cancer:      General: Screening Current          Osteoporosis:      General: Screening Current          AAA:      General: Screening Current          Glaucoma:      General: Screening Not Indicated          HIV:      General: Screening Current          Hepatitis C:      General: Screening Current        Advanced Directives:   Patient has no living will for healthcare, Information on ACP and/or AD provided  5 wishes given  Provider agrees with end of life decisions

## 2019-01-28 ENCOUNTER — TELEPHONE (OUTPATIENT)
Dept: INTERNAL MEDICINE CLINIC | Facility: CLINIC | Age: 64
End: 2019-01-28

## 2019-01-28 NOTE — TELEPHONE ENCOUNTER
PT CALLED STATING THAT THE TRIAMICINOLONE(KENALOG) 0 5 % OINTMENT SEEMS TO BE WORKING FOR HIS POSSIBLE ECZEMA  ALSO THE ZOLOFT SEEMS TO BE WORKING AS WELL HE IS NOW TAKING 150 MG

## 2019-02-11 ENCOUNTER — OFFICE VISIT (OUTPATIENT)
Dept: INTERNAL MEDICINE CLINIC | Facility: CLINIC | Age: 64
End: 2019-02-11
Payer: MEDICARE

## 2019-02-11 VITALS
SYSTOLIC BLOOD PRESSURE: 118 MMHG | WEIGHT: 166.6 LBS | BODY MASS INDEX: 22.57 KG/M2 | HEIGHT: 72 IN | DIASTOLIC BLOOD PRESSURE: 80 MMHG | HEART RATE: 64 BPM | RESPIRATION RATE: 14 BRPM

## 2019-02-11 DIAGNOSIS — Z12.5 SCREENING FOR PROSTATE CANCER: Primary | ICD-10-CM

## 2019-02-11 DIAGNOSIS — K74.00 HEPATIC FIBROSIS: ICD-10-CM

## 2019-02-11 DIAGNOSIS — D68.9 BLOOD CLOTTING DISORDER (HCC): ICD-10-CM

## 2019-02-11 DIAGNOSIS — C22.1 MALIGNANT NEOPLASM OF INTRAHEPATIC BILE DUCTS (HCC): ICD-10-CM

## 2019-02-11 DIAGNOSIS — E11.49 OTHER DIABETIC NEUROLOGICAL COMPLICATION ASSOCIATED WITH TYPE 2 DIABETES MELLITUS (HCC): ICD-10-CM

## 2019-02-11 DIAGNOSIS — F41.9 ANXIETY: ICD-10-CM

## 2019-02-11 DIAGNOSIS — J42 CHRONIC BRONCHITIS, UNSPECIFIED CHRONIC BRONCHITIS TYPE (HCC): ICD-10-CM

## 2019-02-11 DIAGNOSIS — D84.9 IMMUNOSUPPRESSION (HCC): ICD-10-CM

## 2019-02-11 PROBLEM — E11.40 DIABETIC NEUROPATHY (HCC): Status: ACTIVE | Noted: 2019-02-11

## 2019-02-11 PROCEDURE — 99214 OFFICE O/P EST MOD 30 MIN: CPT | Performed by: NURSE PRACTITIONER

## 2019-02-11 RX ORDER — ALPRAZOLAM 0.25 MG/1
TABLET ORAL
Qty: 30 TABLET | Refills: 0 | Status: SHIPPED | OUTPATIENT
Start: 2019-02-11 | End: 2019-09-05 | Stop reason: ALTCHOICE

## 2019-02-11 RX ORDER — ALPRAZOLAM 0.25 MG/1
TABLET ORAL
Qty: 30 TABLET | Refills: 0 | Status: SHIPPED | OUTPATIENT
Start: 2019-02-11 | End: 2019-02-11 | Stop reason: SDUPTHER

## 2019-02-11 NOTE — PATIENT INSTRUCTIONS
Anxiety patient is slowly weaning off Xanax at this time we will get him down to 1 tablet a day whether he takes at nighttime or splits it into twice a day is up to him  He will do this for another 2 weeks then go down to 1/2 tablet for 1-2 weeks then discontinue    Continue Zoloft 150 mg    Rash improved with triamcinolone cream    Chronic pain following with pain management    Nocturia with family history of prostate cancer check PSA    Tobacco history check low-dose CT    Abdominal hernia evaluate with CT

## 2019-02-11 NOTE — PROGRESS NOTES
Assessment/Plan:    Patient Instructions   Anxiety patient is slowly weaning off Xanax at this time we will get him down to 1 tablet a day whether he takes at nighttime or splits it into twice a day is up to him  He will do this for another 2 weeks then go down to 1/2 tablet for 1-2 weeks then discontinue  Continue Zoloft 150 mg    Rash improved with triamcinolone cream    Chronic pain following with pain management    Nocturia with family history of prostate cancer check PSA    Tobacco history check low-dose CT    Abdominal hernia evaluate with CT         Diagnoses and all orders for this visit:    Screening for prostate cancer  -     PSA, Total Screen; Future    Anxiety  -     Discontinue: ALPRAZolam (XANAX) 0 25 mg tablet; Take 1 tablet daily  -     ALPRAZolam (XANAX) 0 25 mg tablet; Take 1 tablet daily    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)    Hepatic fibrosis    Blood clotting disorder (HCC)    Immunosuppression (HCC)    Chronic bronchitis, unspecified chronic bronchitis type ([facility] )    Other diabetic neurological complication associated with type 2 diabetes mellitus ([facility] )         Subjective:      Patient ID: Jose R Govea is a 75 y o  male    Pain patient seems to be doing well cutting down his Xanax he is taking a half of a tablet in the morning and then 1 to 1-1/2 at nighttime  He is thinking about moving in with his girlfriend so he admits that there be less stress  He would like to just be done with the Xanax versus continuing to slowly wean    He is doing well on Zoloft 150    Rash is better with triamcinolone    Gets up in the middle of the night at least 2-3 times to urinate small amounts family history prostate cancer        Current Outpatient Medications:     ALPRAZolam (XANAX) 0 25 mg tablet, Take 1 tablet daily, Disp: 30 tablet, Rfl: 0    aspirin 81 MG tablet, Take 1 tablet by mouth daily, Disp: , Rfl:     atenolol (TENORMIN) 100 mg tablet, TAKE 1 TABLET BY MOUTH TWO  TIMES DAILY, Disp: 180 tablet, Rfl: 3    Calcium Carb-Cholecalciferol 1000-800 MG-UNIT TABS, Take 1 tablet by mouth, Disp: , Rfl:     magnesium oxide (MAG-OX) 400 mg, Take 1 tablet (400 mg total) by mouth 2 (two) times a day, Disp: 60 tablet, Rfl: 0    oxyCODONE (OXYCONTIN) 40 mg 12 hr tablet, Take by mouth, Disp: , Rfl:     oxyCODONE (ROXICODONE) 10 MG TABS, , Disp: , Rfl:     pantoprazole (PROTONIX) 40 mg tablet, TAKE 1 TABLET BY MOUTH  DAILY, Disp: 90 tablet, Rfl: 3    pravastatin (PRAVACHOL) 20 mg tablet, TAKE 1 TABLET BY MOUTH  DAILY, Disp: 90 tablet, Rfl: 3    Resveratrol 50 MG CAPS, Take by mouth, Disp: , Rfl:     Sennosides 15 MG TABS, Take by mouth, Disp: , Rfl:     sertraline (ZOLOFT) 100 mg tablet, Take 1 1/2 tab daily, Disp: 60 tablet, Rfl: 1    tacrolimus (PROGRAF) 1 mg capsule, Take 2 mg by mouth 2 (two) times a day, Disp: , Rfl:     triamcinolone (KENALOG) 0 5 % ointment, Apply topically 2 (two) times a day, Disp: 30 g, Rfl: 0    No results found for this or any previous visit (from the past 1008 hour(s))  The following portions of the patient's history were reviewed and updated as appropriate: allergies, current medications, past family history, past medical history, past social history, past surgical history and problem list      Review of Systems   Constitutional: Negative for appetite change, chills, diaphoresis, fatigue, fever and unexpected weight change  HENT: Negative for postnasal drip and sneezing  Eyes: Negative for visual disturbance  Respiratory: Negative for chest tightness and shortness of breath  Cardiovascular: Negative for chest pain, palpitations and leg swelling  Gastrointestinal: Negative for abdominal pain and blood in stool  Endocrine: Negative for cold intolerance, heat intolerance, polydipsia, polyphagia and polyuria  Genitourinary: Negative for difficulty urinating, dysuria, frequency and urgency     Musculoskeletal: Negative for arthralgias and myalgias  Skin: Negative for rash and wound  Neurological: Negative for dizziness, weakness, light-headedness and headaches  Hematological: Negative for adenopathy  Psychiatric/Behavioral: Negative for confusion, dysphoric mood and sleep disturbance  The patient is not nervous/anxious  Objective:      /80 (BP Location: Left arm, Patient Position: Sitting, Cuff Size: Standard)   Pulse 64   Resp 14   Ht 6' (1 829 m)   Wt 75 6 kg (166 lb 9 6 oz)   BMI 22 60 kg/m²        Physical Exam   Constitutional: He is oriented to person, place, and time  He appears well-developed and well-nourished  HENT:   Head: Normocephalic and atraumatic  Nose: Nose normal    Mouth/Throat: Oropharynx is clear and moist    Eyes: Pupils are equal, round, and reactive to light  Conjunctivae and EOM are normal  No scleral icterus  Neck: Normal range of motion  Neck supple  No JVD present  No tracheal deviation present  No thyromegaly present  Cardiovascular: Normal rate, regular rhythm and intact distal pulses  Exam reveals no gallop and no friction rub  No murmur heard  Pulmonary/Chest: Effort normal and breath sounds normal  No respiratory distress  He has no wheezes  He has no rales  Abdominal:   Multiple scarring noted with a prominent umbilical/ventral hernia   Musculoskeletal: He exhibits no edema or deformity  Lymphadenopathy:     He has no cervical adenopathy  Neurological: He is alert and oriented to person, place, and time  No cranial nerve deficit  Skin: Skin is warm  Rash noted  He is not diaphoretic  No erythema  No pallor  Scaling erythematous rash with lichenfication of the skin noted to the right back extending up to the shoulder and to the ankles scattered erythematous papules to the buttocks   Psychiatric: He has a normal mood and affect   His behavior is normal  Judgment and thought content normal

## 2019-02-18 DIAGNOSIS — R21 RASH: ICD-10-CM

## 2019-02-18 RX ORDER — TRIAMCINOLONE ACETONIDE 5 MG/G
OINTMENT TOPICAL 2 TIMES DAILY
Qty: 30 G | Refills: 0 | Status: SHIPPED | OUTPATIENT
Start: 2019-02-18 | End: 2019-02-19 | Stop reason: SDUPTHER

## 2019-02-19 RX ORDER — TRIAMCINOLONE ACETONIDE 5 MG/G
OINTMENT TOPICAL 2 TIMES DAILY
Qty: 30 G | Refills: 0 | Status: SHIPPED | OUTPATIENT
Start: 2019-02-19 | End: 2019-03-19 | Stop reason: SDUPTHER

## 2019-02-25 ENCOUNTER — TELEPHONE (OUTPATIENT)
Dept: INTERNAL MEDICINE CLINIC | Facility: CLINIC | Age: 64
End: 2019-02-25

## 2019-02-25 DIAGNOSIS — I10 HYPERTENSION, UNSPECIFIED TYPE: ICD-10-CM

## 2019-02-25 RX ORDER — ATENOLOL 100 MG/1
100 TABLET ORAL 2 TIMES DAILY
Qty: 180 TABLET | Refills: 3 | Status: SHIPPED | OUTPATIENT
Start: 2019-02-25 | End: 2020-03-03

## 2019-02-25 NOTE — TELEPHONE ENCOUNTER
Please send to the [facility]    Refill of TENORMIN 100 mg and PROTONIX 40 mg     Would like 90 days supply

## 2019-03-07 ENCOUNTER — TELEPHONE (OUTPATIENT)
Dept: INTERNAL MEDICINE CLINIC | Facility: CLINIC | Age: 64
End: 2019-03-07

## 2019-03-07 NOTE — TELEPHONE ENCOUNTER
Pt called requesting a call from Teri Boss in regards to his Anti depressant zoloft  Please contact pt at [phone]

## 2019-03-08 NOTE — TELEPHONE ENCOUNTER
I believe at this point he is totally off of Xanax, correct?   Maybe we should go back down to just 100 of Zoloft or we can consider switching him to either Cymbalta or Paxil which may help control pain and anxiety a little bit better

## 2019-03-08 NOTE — TELEPHONE ENCOUNTER
[id number] Shazia Abdul well he can go down to 50mg if he wants but we have other options if he wants to change the meds, just make an appt

## 2019-03-08 NOTE — TELEPHONE ENCOUNTER
Jonelle Kocher said he is totally off the Xanax and he said he doesn't know if he even wants to be on anything anymore, just doesn't like the stuff     He said he did only take 100mg last night, doesn't feel that bad but doesn't feel good either    Ale Forte he always feels like he has a hangover, and he doesn't even drink anymore, headache, dizzy

## 2019-03-19 DIAGNOSIS — R21 RASH: ICD-10-CM

## 2019-03-19 RX ORDER — TRIAMCINOLONE ACETONIDE 5 MG/G
OINTMENT TOPICAL 2 TIMES DAILY
Qty: 30 G | Refills: 0 | Status: SHIPPED | OUTPATIENT
Start: 2019-03-19 | End: 2020-04-07

## 2019-03-19 NOTE — TELEPHONE ENCOUNTER
Pt called to let Airam De Leon know that he has been weaning down on sertraline  He was taking 150mg ARY but is now on 50mg ARY  Says he was getting sick and having a lot of side affects on higher dosage  Wants to stay on 50mg for a few weeks to he how he feels, then will call with progress note if ok with Airam De Leon       Pt also needs a refill:  Triamcinolone 0 5% ointment   Apply topically twice a day     Send to [facility] in [facility]       [phone]

## 2019-04-10 DIAGNOSIS — F41.9 ANXIETY: ICD-10-CM

## 2019-04-11 RX ORDER — SERTRALINE HYDROCHLORIDE 100 MG/1
TABLET, FILM COATED ORAL
Qty: 60 TABLET | Refills: 0 | Status: SHIPPED | OUTPATIENT
Start: 2019-04-11 | End: 2019-09-05 | Stop reason: ALTCHOICE

## 2019-04-12 ENCOUNTER — TELEPHONE (OUTPATIENT)
Dept: INTERNAL MEDICINE CLINIC | Facility: CLINIC | Age: 64
End: 2019-04-12

## 2019-04-12 DIAGNOSIS — R21 RASH: Primary | ICD-10-CM

## 2019-04-12 RX ORDER — HYDROXYZINE HYDROCHLORIDE 25 MG/1
25 TABLET, FILM COATED ORAL 3 TIMES DAILY
Qty: 60 TABLET | Refills: 0 | Status: SHIPPED | OUTPATIENT
Start: 2019-04-12 | End: 2019-09-05 | Stop reason: ALTCHOICE

## 2019-04-17 ENCOUNTER — OFFICE VISIT (OUTPATIENT)
Dept: DERMATOLOGY | Facility: CLINIC | Age: 64
End: 2019-04-17
Payer: MEDICARE

## 2019-04-17 DIAGNOSIS — R21 RASH: Primary | ICD-10-CM

## 2019-04-17 DIAGNOSIS — Z13.89 SCREENING FOR SKIN CONDITION: ICD-10-CM

## 2019-04-17 DIAGNOSIS — Z92.25 HISTORY OF IMMUNOSUPPRESSIVE THERAPY: ICD-10-CM

## 2019-04-17 PROCEDURE — 88312 SPECIAL STAINS GROUP 1: CPT | Performed by: PATHOLOGY

## 2019-04-17 PROCEDURE — 88305 TISSUE EXAM BY PATHOLOGIST: CPT | Performed by: PATHOLOGY

## 2019-04-17 PROCEDURE — 99214 OFFICE O/P EST MOD 30 MIN: CPT | Performed by: DERMATOLOGY

## 2019-04-17 PROCEDURE — 11102 TANGNTL BX SKIN SINGLE LES: CPT | Performed by: DERMATOLOGY

## 2019-05-31 DIAGNOSIS — E78.5 HYPERLIPIDEMIA, UNSPECIFIED HYPERLIPIDEMIA TYPE: ICD-10-CM

## 2019-05-31 RX ORDER — PRAVASTATIN SODIUM 20 MG
20 TABLET ORAL DAILY
Qty: 90 TABLET | Refills: 3 | Status: SHIPPED | OUTPATIENT
Start: 2019-05-31 | End: 2020-06-16

## 2019-08-27 ENCOUNTER — TELEPHONE (OUTPATIENT)
Dept: INTERNAL MEDICINE CLINIC | Facility: CLINIC | Age: 64
End: 2019-08-27

## 2019-08-27 NOTE — TELEPHONE ENCOUNTER
Yes it appears he has Lyme disease, he should make an appointment to discuss unless they are treating it

## 2019-08-27 NOTE — TELEPHONE ENCOUNTER
Patient had labs done by Pain Management and patient would like Dr Janice Harkins to review them and let him know what additional follow up will be needed  He was informed that there may be Lyme disease  The labs are in the patient's chart from [facility]  Patients CB number is [phone]

## 2019-09-03 ENCOUNTER — APPOINTMENT (OUTPATIENT)
Dept: LAB | Facility: CLINIC | Age: 64
End: 2019-09-03
Payer: MEDICARE

## 2019-09-03 DIAGNOSIS — F41.9 ANXIETY: ICD-10-CM

## 2019-09-03 DIAGNOSIS — R73.01 IMPAIRED FASTING GLUCOSE: ICD-10-CM

## 2019-09-03 DIAGNOSIS — Z12.5 SCREENING FOR PROSTATE CANCER: ICD-10-CM

## 2019-09-03 DIAGNOSIS — E78.2 MIXED HYPERLIPIDEMIA: ICD-10-CM

## 2019-09-03 LAB
ALBUMIN SERPL BCP-MCNC: 3.7 G/DL (ref 3.5–5)
ALP SERPL-CCNC: 66 U/L (ref 46–116)
ALT SERPL W P-5'-P-CCNC: 14 U/L (ref 12–78)
ANION GAP SERPL CALCULATED.3IONS-SCNC: 11 MMOL/L (ref 4–13)
AST SERPL W P-5'-P-CCNC: 15 U/L (ref 5–45)
BASOPHILS # BLD AUTO: 0.08 THOUSANDS/ΜL (ref 0–0.1)
BASOPHILS NFR BLD AUTO: 1 % (ref 0–1)
BILIRUB SERPL-MCNC: 0.62 MG/DL (ref 0.2–1)
BUN SERPL-MCNC: 13 MG/DL (ref 5–25)
CALCIUM SERPL-MCNC: 9 MG/DL (ref 8.3–10.1)
CHLORIDE SERPL-SCNC: 101 MMOL/L (ref 100–108)
CHOLEST SERPL-MCNC: 112 MG/DL (ref 50–200)
CO2 SERPL-SCNC: 22 MMOL/L (ref 21–32)
CREAT SERPL-MCNC: 1.08 MG/DL (ref 0.6–1.3)
EOSINOPHIL # BLD AUTO: 0.35 THOUSAND/ΜL (ref 0–0.61)
EOSINOPHIL NFR BLD AUTO: 5 % (ref 0–6)
ERYTHROCYTE [DISTWIDTH] IN BLOOD BY AUTOMATED COUNT: 12.6 % (ref 11.6–15.1)
EST. AVERAGE GLUCOSE BLD GHB EST-MCNC: 114 MG/DL
GFR SERPL CREATININE-BSD FRML MDRD: 71 ML/MIN/1.73SQ M
GLUCOSE SERPL-MCNC: 119 MG/DL (ref 65–140)
HBA1C MFR BLD: 5.6 % (ref 4.2–6.3)
HCT VFR BLD AUTO: 36.9 % (ref 36.5–49.3)
HDLC SERPL-MCNC: 37 MG/DL (ref 40–60)
HGB BLD-MCNC: 12.1 G/DL (ref 12–17)
IMM GRANULOCYTES # BLD AUTO: 0.01 THOUSAND/UL (ref 0–0.2)
IMM GRANULOCYTES NFR BLD AUTO: 0 % (ref 0–2)
LDLC SERPL CALC-MCNC: 55 MG/DL (ref 0–100)
LYMPHOCYTES # BLD AUTO: 2.03 THOUSANDS/ΜL (ref 0.6–4.47)
LYMPHOCYTES NFR BLD AUTO: 31 % (ref 14–44)
MCH RBC QN AUTO: 31.7 PG (ref 26.8–34.3)
MCHC RBC AUTO-ENTMCNC: 32.8 G/DL (ref 31.4–37.4)
MCV RBC AUTO: 97 FL (ref 82–98)
MONOCYTES # BLD AUTO: 0.33 THOUSAND/ΜL (ref 0.17–1.22)
MONOCYTES NFR BLD AUTO: 5 % (ref 4–12)
NEUTROPHILS # BLD AUTO: 3.66 THOUSANDS/ΜL (ref 1.85–7.62)
NEUTS SEG NFR BLD AUTO: 58 % (ref 43–75)
NONHDLC SERPL-MCNC: 75 MG/DL
NRBC BLD AUTO-RTO: 0 /100 WBCS
PLATELET # BLD AUTO: 211 THOUSANDS/UL (ref 149–390)
PMV BLD AUTO: 10.7 FL (ref 8.9–12.7)
POTASSIUM SERPL-SCNC: 4.3 MMOL/L (ref 3.5–5.3)
PROT SERPL-MCNC: 7.8 G/DL (ref 6.4–8.2)
PSA SERPL-MCNC: 0.2 NG/ML (ref 0–4)
RBC # BLD AUTO: 3.82 MILLION/UL (ref 3.88–5.62)
SODIUM SERPL-SCNC: 134 MMOL/L (ref 136–145)
TRIGL SERPL-MCNC: 102 MG/DL
TSH SERPL DL<=0.05 MIU/L-ACNC: 1.09 UIU/ML (ref 0.36–3.74)
WBC # BLD AUTO: 6.46 THOUSAND/UL (ref 4.31–10.16)

## 2019-09-03 PROCEDURE — 83036 HEMOGLOBIN GLYCOSYLATED A1C: CPT

## 2019-09-03 PROCEDURE — 80053 COMPREHEN METABOLIC PANEL: CPT

## 2019-09-03 PROCEDURE — 84443 ASSAY THYROID STIM HORMONE: CPT

## 2019-09-03 PROCEDURE — 80061 LIPID PANEL: CPT

## 2019-09-03 PROCEDURE — 36415 COLL VENOUS BLD VENIPUNCTURE: CPT

## 2019-09-03 PROCEDURE — 85025 COMPLETE CBC W/AUTO DIFF WBC: CPT

## 2019-09-03 PROCEDURE — G0103 PSA SCREENING: HCPCS

## 2019-09-05 ENCOUNTER — APPOINTMENT (OUTPATIENT)
Dept: LAB | Facility: CLINIC | Age: 64
End: 2019-09-05
Payer: MEDICARE

## 2019-09-05 ENCOUNTER — OFFICE VISIT (OUTPATIENT)
Dept: INTERNAL MEDICINE CLINIC | Facility: CLINIC | Age: 64
End: 2019-09-05
Payer: MEDICARE

## 2019-09-05 VITALS
RESPIRATION RATE: 14 BRPM | WEIGHT: 158.2 LBS | DIASTOLIC BLOOD PRESSURE: 84 MMHG | SYSTOLIC BLOOD PRESSURE: 122 MMHG | BODY MASS INDEX: 21.43 KG/M2 | HEART RATE: 78 BPM | HEIGHT: 72 IN

## 2019-09-05 DIAGNOSIS — R63.4 WEIGHT LOSS: ICD-10-CM

## 2019-09-05 DIAGNOSIS — E11.49 OTHER DIABETIC NEUROLOGICAL COMPLICATION ASSOCIATED WITH TYPE 2 DIABETES MELLITUS (HCC): ICD-10-CM

## 2019-09-05 DIAGNOSIS — R30.0 DYSURIA: ICD-10-CM

## 2019-09-05 DIAGNOSIS — I10 ESSENTIAL HYPERTENSION: ICD-10-CM

## 2019-09-05 DIAGNOSIS — Z94.4 LIVER TRANSPLANTED (HCC): Primary | ICD-10-CM

## 2019-09-05 DIAGNOSIS — E78.2 MIXED HYPERLIPIDEMIA: ICD-10-CM

## 2019-09-05 DIAGNOSIS — A69.23 LYME ARTHRITIS (HCC): ICD-10-CM

## 2019-09-05 DIAGNOSIS — K21.00 ESOPHAGITIS, REFLUX: ICD-10-CM

## 2019-09-05 DIAGNOSIS — J42 CHRONIC BRONCHITIS, UNSPECIFIED CHRONIC BRONCHITIS TYPE (HCC): ICD-10-CM

## 2019-09-05 DIAGNOSIS — Z72.0 TOBACCO ABUSE: ICD-10-CM

## 2019-09-05 DIAGNOSIS — D84.9 IMMUNOSUPPRESSION (HCC): ICD-10-CM

## 2019-09-05 LAB
AMYLASE SERPL-CCNC: 58 IU/L (ref 25–115)
BACTERIA UR QL AUTO: NORMAL /HPF
BILIRUB UR QL STRIP: NEGATIVE
CLARITY UR: CLEAR
COLOR UR: YELLOW
CREAT UR-MCNC: 129 MG/DL
GLUCOSE UR STRIP-MCNC: NEGATIVE MG/DL
HGB UR QL STRIP.AUTO: NEGATIVE
HYALINE CASTS #/AREA URNS LPF: NORMAL /LPF
KETONES UR STRIP-MCNC: NEGATIVE MG/DL
LDH SERPL-CCNC: 217 U/L (ref 81–234)
LEUKOCYTE ESTERASE UR QL STRIP: NEGATIVE
LIPASE SERPL-CCNC: 117 U/L (ref 73–393)
MICROALBUMIN UR-MCNC: 163 MG/L (ref 0–20)
MICROALBUMIN/CREAT 24H UR: 126 MG/G CREATININE (ref 0–30)
NITRITE UR QL STRIP: NEGATIVE
NON-SQ EPI CELLS URNS QL MICRO: NORMAL /HPF
PH UR STRIP.AUTO: 6.5 [PH]
PROT UR STRIP-MCNC: ABNORMAL MG/DL
RBC #/AREA URNS AUTO: NORMAL /HPF
SP GR UR STRIP.AUTO: 1.02 (ref 1–1.03)
UROBILINOGEN UR QL STRIP.AUTO: 1 E.U./DL
WBC #/AREA URNS AUTO: NORMAL /HPF

## 2019-09-05 PROCEDURE — 87389 HIV-1 AG W/HIV-1&-2 AB AG IA: CPT

## 2019-09-05 PROCEDURE — 83615 LACTATE (LD) (LDH) ENZYME: CPT

## 2019-09-05 PROCEDURE — 82043 UR ALBUMIN QUANTITATIVE: CPT

## 2019-09-05 PROCEDURE — 87086 URINE CULTURE/COLONY COUNT: CPT

## 2019-09-05 PROCEDURE — 83690 ASSAY OF LIPASE: CPT

## 2019-09-05 PROCEDURE — 82570 ASSAY OF URINE CREATININE: CPT

## 2019-09-05 PROCEDURE — 99215 OFFICE O/P EST HI 40 MIN: CPT | Performed by: NURSE PRACTITIONER

## 2019-09-05 PROCEDURE — 87491 CHLMYD TRACH DNA AMP PROBE: CPT

## 2019-09-05 PROCEDURE — 81001 URINALYSIS AUTO W/SCOPE: CPT | Performed by: NURSE PRACTITIONER

## 2019-09-05 PROCEDURE — 87591 N.GONORRHOEAE DNA AMP PROB: CPT

## 2019-09-05 PROCEDURE — 82150 ASSAY OF AMYLASE: CPT

## 2019-09-05 PROCEDURE — 36415 COLL VENOUS BLD VENIPUNCTURE: CPT

## 2019-09-05 RX ORDER — DOXYCYCLINE 100 MG/1
100 CAPSULE ORAL 2 TIMES DAILY
Qty: 28 CAPSULE | Refills: 0 | Status: SHIPPED | OUTPATIENT
Start: 2019-09-05 | End: 2019-09-24 | Stop reason: ALTCHOICE

## 2019-09-05 NOTE — PROGRESS NOTES
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 no history of HCC or other malignancy     continued tobacco abuse cessation strongly encouraged     difficulty with urination check labs to rule out STI plus UA and culture to rule out infection versus microscopic hematuria, in the setting of tobacco abuse again check CT abdomen and pelvis     nausea with weight loss as above check amylase lipase     anxiety stable off of all medication     chronic pain now using medical marijuana     type 2 diabetes mellitus under excellent control with dietary modifications and weight loss     hyperlipidemia LDL at goal continue statin      Acute Lyme pathophysiology discussed with patient's start doxycycline for 14 days  Risk benefit side effects discussed  Probiotics recommended  Follow back in 2 weeks  Diagnoses and all orders for this visit:    Liver transplanted ([facility] )    Esophagitis, reflux    Other diabetic neurological complication associated with type 2 diabetes mellitus ([facility] )    Essential hypertension    Chronic bronchitis, unspecified chronic bronchitis type ([facility] )    Tobacco abuse    Mixed hyperlipidemia    Weight loss  -     Amylase; Future  -     Lipase; Future  -     HIV 1/2 AG-AB combo; Future  -     LD,Blood; Future  -     CT chest wo contrast; Future  -     CT abdomen pelvis wo contrast; Future    Dysuria  -     Chlamydia/GC amplified DNA by PCR; Future  -     Urinalysis with reflex to microscopic  -     Urine culture; Future    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HCC)  -     doxycycline monohydrate (MONODOX) 100 mg capsule;  Take 1 capsule (100 mg total) by mouth 2 (two) times a day for 14 days    Immunosuppression (HCC)         Subjective:      Patient ID: Cristian Cr is a 75 y o  male     Patient is here to follow up routine labs, in addition he was following with pain management at [facility]  He had mentioned to the provider that he had increase in body aches and pains, so a lab /line was drawn to show that he had acute line  He is here to follow this up  He is no longer getting narcotics from Pain Management he now uses medical marijuana  He got his prescription card from a doctor different go in when gap  He is off the Zoloft and Xanax he is able to manage his anxiety with the medical marijuana and just lifestyle modifications  He notes over the last 6+ months that he has been losing weight despite eating excessively  He does have intermittent nausea he does have chronic abdominal pain  He has no change in his bowel movements  He does have urinary frequency sometimes with burning with urination has pressure to the pelvic area  He does continue to smoke  He is status post 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 no history of liver cancer          Current Outpatient Medications:     aspirin 81 MG tablet, Take 1 tablet by mouth daily, Disp: , Rfl:     atenolol (TENORMIN) 100 mg tablet, Take 1 tablet (100 mg total) by mouth 2 (two) times a day, Disp: 180 tablet, Rfl: 3    Calcium Carb-Cholecalciferol 1000-800 MG-UNIT TABS, Take 1 tablet by mouth, Disp: , Rfl:     oxyCODONE (OXYCONTIN) 40 mg 12 hr tablet, Take by mouth, Disp: , Rfl:     oxyCODONE (ROXICODONE) 10 MG TABS, , Disp: , Rfl:     pantoprazole (PROTONIX) 40 mg tablet, TAKE 1 TABLET BY MOUTH  DAILY, Disp: 90 tablet, Rfl: 3    pravastatin (PRAVACHOL) 20 mg tablet, Take 1 tablet (20 mg total) by mouth daily, Disp: 90 tablet, Rfl: 3    tacrolimus (PROGRAF) 1 mg capsule, Take 2 mg by mouth 2 (two) times a day, Disp: , Rfl:     triamcinolone (KENALOG) 0 1 % ointment, Apply topically 2 (two) times a day Applied to rash twice a day till clear, Disp: 454 g, Rfl: 1    triamcinolone (KENALOG) 0 5 % ointment, Apply topically 2 (two) times a day, Disp: 30 g, Rfl: 0    doxycycline monohydrate (MONODOX) 100 mg capsule, Take 1 capsule (100 mg total) by mouth 2 (two) times a day for 14 days, Disp: 28 capsule, Rfl: 0    Resveratrol 50 MG CAPS, Take by mouth, Disp: , Rfl:     Sennosides 15 MG TABS, Take by mouth, Disp: , Rfl:     Recent Results (from the past 1008 hour(s))   CBC and differential    Collection Time: 09/03/19  2:02 PM   Result Value Ref Range    WBC 6 46 4 31 - 10 16 Thousand/uL    RBC 3 82 (L) 3 88 - 5 62 Million/uL    Hemoglobin 12 1 12 0 - 17 0 g/dL    Hematocrit 36 9 36 5 - 49 3 %    MCV 97 82 - 98 fL    MCH 31 7 26 8 - 34 3 pg    MCHC 32 8 31 4 - 37 4 g/dL    RDW 12 6 11 6 - 15 1 %    MPV 10 7 8 9 - 12 7 fL    Platelets 840 527 - 447 Thousands/uL    nRBC 0 /100 WBCs    Neutrophils Relative 58 43 - 75 %    Immat GRANS % 0 0 - 2 %    Lymphocytes Relative 31 14 - 44 %    Monocytes Relative 5 4 - 12 %    Eosinophils Relative 5 0 - 6 %    Basophils Relative 1 0 - 1 %    Neutrophils Absolute 3 66 1 85 - 7 62 Thousands/µL    Immature Grans Absolute 0 01 0 00 - 0 20 Thousand/uL    Lymphocytes Absolute 2 03 0 60 - 4 47 Thousands/µL    Monocytes Absolute 0 33 0 17 - 1 22 Thousand/µL    Eosinophils Absolute 0 35 0 00 - 0 61 Thousand/µL    Basophils Absolute 0 08 0 00 - 0 10 Thousands/µL   Comprehensive metabolic panel    Collection Time: 09/03/19  2:02 PM   Result Value Ref Range    Sodium 134 (L) 136 - 145 mmol/L    Potassium 4 3 3 5 - 5 3 mmol/L    Chloride 101 100 - 108 mmol/L    CO2 22 21 - 32 mmol/L    ANION GAP 11 4 - 13 mmol/L    BUN 13 5 - 25 mg/dL    Creatinine 1 08 0 60 - 1 30 mg/dL    Glucose 119 65 - 140 mg/dL    Calcium 9 0 8 3 - 10 1 mg/dL    AST 15 5 - 45 U/L    ALT 14 12 - 78 U/L    Alkaline Phosphatase 66 46 - 116 U/L    Total Protein 7 8 6 4 - 8 2 g/dL    Albumin 3 7 3 5 - 5 0 g/dL    Total Bilirubin 0 62 0 20 - 1 00 mg/dL    eGFR 71 ml/min/1 73sq m   Lipid panel    Collection Time: 09/03/19  2:02 PM   Result Value Ref Range    Cholesterol 112 50 - 200 mg/dL    Triglycerides 102 <=150 mg/dL    HDL, Direct 37 (L) 40 - 60 mg/dL    LDL Calculated 55 0 - 100 mg/dL    Non-HDL-Chol (CHOL-HDL) 75 mg/dl   Hemoglobin A1C    Collection Time: 09/03/19  2:02 PM   Result Value Ref Range    Hemoglobin A1C 5 6 4 2 - 6 3 %     mg/dl   TSH, 3rd generation with Free T4 reflex    Collection Time: 09/03/19  2:02 PM   Result Value Ref Range    TSH 3RD GENERATON 1 090 0 358 - 3 740 uIU/mL   PSA, Total Screen    Collection Time: 09/03/19  2:02 PM   Result Value Ref Range    PSA 0 2 0 0 - 4 0 ng/mL       The following portions of the patient's history were reviewed and updated as appropriate: allergies, current medications, past family history, past medical history, past social history, past surgical history and problem list      Review of Systems   Constitutional: Positive for diaphoresis, fatigue and unexpected weight change  Negative for appetite change, chills and fever  HENT: Negative for postnasal drip and sneezing  Eyes: Negative for visual disturbance  Respiratory: Negative for chest tightness and shortness of breath  Cardiovascular: Negative for chest pain, palpitations and leg swelling  Gastrointestinal: Positive for abdominal pain and nausea  Negative for blood in stool  Endocrine: Negative for cold intolerance, heat intolerance, polydipsia, polyphagia and polyuria  Genitourinary: Negative for difficulty urinating, dysuria, frequency and urgency  Musculoskeletal: Negative for arthralgias and myalgias  Skin: Negative for rash and wound  Neurological: Negative for dizziness, weakness, light-headedness and headaches  Hematological: Negative for adenopathy  Psychiatric/Behavioral: Negative for confusion, dysphoric mood and sleep disturbance  The patient is not nervous/anxious            Objective:      /84 (BP Location: Left arm, Patient Position: Sitting, Cuff Size: Standard)   Pulse 78   Resp 14   Ht 6' (1 829 m)   Wt 71 8 kg (158 lb 3 2 oz)   BMI 21 46 kg/m²        Physical Exam   Constitutional: He is oriented to person, place, and time  No distress  cachectic   HENT:   Head: Normocephalic and atraumatic  Nose: Nose normal    Mouth/Throat: Oropharynx is clear and moist    Eyes: Pupils are equal, round, and reactive to light  Conjunctivae and EOM are normal    Neck: Normal range of motion  Neck supple  No JVD present  No tracheal deviation present  No thyromegaly present  Cardiovascular: Normal rate, regular rhythm, normal heart sounds and intact distal pulses  Exam reveals no gallop and no friction rub  No murmur heard  Pulses:       Dorsalis pedis pulses are 2+ on the right side, and 2+ on the left side  Posterior tibial pulses are 2+ on the right side, and 2+ on the left side  Pulmonary/Chest: Effort normal and breath sounds normal  No respiratory distress  He has no wheezes  He has no rales  Abdominal: Soft  Bowel sounds are normal  He exhibits no distension  There is tenderness  Musculoskeletal: Normal range of motion  He exhibits no edema  Feet:   Right Foot:   Skin Integrity: Positive for dry skin  Negative for ulcer, skin breakdown, erythema, warmth or callus  Left Foot:   Skin Integrity: Positive for dry skin  Negative for ulcer, skin breakdown, erythema, warmth or callus  Lymphadenopathy:     He has no cervical adenopathy  Neurological: He is alert and oriented to person, place, and time  No cranial nerve deficit  Skin: Skin is warm and dry  No rash noted  He is not diaphoretic  Psychiatric: He has a normal mood and affect  His behavior is normal  Judgment and thought content normal    Diabetic Foot Exam    Patient's shoes and socks removed  Right Foot/Ankle   Right Foot Inspection  Skin Exam: skin normal, skin intact and dry skin no warmth, no callus, no erythema, no maceration, no abnormal color, no pre-ulcer, no ulcer and no callus                          Toe Exam: ROM and strength within normal limits  Sensory   Vibration: intact  Proprioception: intact   Monofilament testing: diminished  Vascular  Capillary refills: < 3 seconds  The right DP pulse is 2+  The right PT pulse is 2+  Left Foot/Ankle  Left Foot Inspection  Skin Exam: skin normal, skin intact and dry skinno warmth, no erythema, no maceration, normal color, no pre-ulcer, no ulcer and no callus                         Toe Exam: ROM and strength within normal limits                   Sensory   Vibration: intact  Proprioception: intact  Monofilament: diminished  Vascular  Capillary refills: < 3 seconds  The left DP pulse is 2+  The left PT pulse is 2+     Assign Risk Category:  No deformity present; ;        Risk: 0

## 2019-09-05 NOTE — PATIENT INSTRUCTIONS
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 no history of HCC or other malignancy     continued tobacco abuse cessation strongly encouraged     difficulty with urination check labs to rule out STI plus UA and culture to rule out infection versus microscopic hematuria, in the setting of tobacco abuse again check CT abdomen and pelvis     nausea with weight loss as above check amylase lipase     anxiety stable off of all medication     chronic pain now using medical marijuana     type 2 diabetes mellitus under excellent control with dietary modifications and weight loss     hyperlipidemia LDL at goal continue statin      Acute Lyme pathophysiology discussed with patient's start doxycycline for 14 days  Risk benefit side effects discussed  Probiotics recommended  Follow back in 2 weeks

## 2019-09-06 LAB
BACTERIA UR CULT: NORMAL
C TRACH DNA SPEC QL NAA+PROBE: NEGATIVE
HIV 1+2 AB+HIV1 P24 AG SERPL QL IA: NORMAL
N GONORRHOEA DNA SPEC QL NAA+PROBE: NEGATIVE

## 2019-09-11 ENCOUNTER — TRANSCRIBE ORDERS (OUTPATIENT)
Dept: ADMINISTRATIVE | Facility: HOSPITAL | Age: 64
End: 2019-09-11

## 2019-09-16 ENCOUNTER — HOSPITAL ENCOUNTER (OUTPATIENT)
Dept: CT IMAGING | Facility: CLINIC | Age: 64
Discharge: HOME | End: 2019-09-16
Payer: MEDICARE

## 2019-09-16 DIAGNOSIS — R63.4 WEIGHT LOSS: ICD-10-CM

## 2019-09-16 PROCEDURE — 71250 CT THORAX DX C-: CPT

## 2019-09-16 PROCEDURE — 74176 CT ABD & PELVIS W/O CONTRAST: CPT

## 2019-09-20 ENCOUNTER — TELEPHONE (OUTPATIENT)
Dept: INTERNAL MEDICINE CLINIC | Facility: CLINIC | Age: 64
End: 2019-09-20

## 2019-09-20 NOTE — TELEPHONE ENCOUNTER
PATIENT CALLED AND SAID THAT HE HAS BEEN ON ANTIBIOTICS FOR 14 DAYS FOR LYMES DISEASE  HE IS OUT OF THE MEDICATION AND DOES NOT HAVE APPT TILL 9/24/19    SHOULD PATIENT CONTINUE WITH THE ANTIBIOTICS   PLEASE ADVISE  HIS #   [phone]

## 2019-09-24 ENCOUNTER — OFFICE VISIT (OUTPATIENT)
Dept: INTERNAL MEDICINE CLINIC | Facility: CLINIC | Age: 64
End: 2019-09-24
Payer: MEDICARE

## 2019-09-24 ENCOUNTER — TELEPHONE (OUTPATIENT)
Dept: INTERNAL MEDICINE CLINIC | Facility: CLINIC | Age: 64
End: 2019-09-24

## 2019-09-24 VITALS
SYSTOLIC BLOOD PRESSURE: 140 MMHG | DIASTOLIC BLOOD PRESSURE: 82 MMHG | HEIGHT: 72 IN | RESPIRATION RATE: 16 BRPM | BODY MASS INDEX: 20.94 KG/M2 | WEIGHT: 154.6 LBS | HEART RATE: 60 BPM

## 2019-09-24 DIAGNOSIS — I10 ESSENTIAL HYPERTENSION: ICD-10-CM

## 2019-09-24 DIAGNOSIS — A69.20 LYME DISEASE, ACUTE: ICD-10-CM

## 2019-09-24 DIAGNOSIS — R73.01 IMPAIRED FASTING GLUCOSE: Primary | ICD-10-CM

## 2019-09-24 DIAGNOSIS — E78.2 MIXED HYPERLIPIDEMIA: ICD-10-CM

## 2019-09-24 DIAGNOSIS — J84.9 ILD (INTERSTITIAL LUNG DISEASE) (HCC): ICD-10-CM

## 2019-09-24 DIAGNOSIS — J42 CHRONIC BRONCHITIS, UNSPECIFIED CHRONIC BRONCHITIS TYPE (HCC): ICD-10-CM

## 2019-09-24 PROCEDURE — 99214 OFFICE O/P EST MOD 30 MIN: CPT | Performed by: NURSE PRACTITIONER

## 2019-09-24 NOTE — PROGRESS NOTES
Assessment/Plan:    Patient Instructions     Weight loss of unclear etiology no evidence of malignancies  I did recommend that if he continue with weight loss and nausea he should go back to see GI for an endoscopy continue PPI    Copd/ILD he uses O2 at bedtime, his O2 was 90% on room air at rest, he did drop during ambulation to 88 percent on room air  When placed on 2 liters of O2 he went up to 94 percent while ambulating  This was done during a chronic stable condition  We will attempt to recertify him for home O2     status post liver transplant following with transplant  Team     status post acute Lyme treated with 2 weeks of doxycycline symptoms are markedly improved     chronic pain following with pain management does have medical marijuana card         Diagnoses and all orders for this visit:    Impaired fasting glucose  -     Hemoglobin A1C; Future    Chronic bronchitis, unspecified chronic bronchitis type ([facility] )    Lyme disease, acute    ILD (interstitial lung disease) ([facility] )  -     Ambulatory referral to Pulmonology; Future    Essential hypertension  -     CBC and differential; Future  -     Comprehensive metabolic panel; Future    Mixed hyperlipidemia  -     Lipid panel; Future  -     TSH, 3rd generation with Free T4 reflex; Future    Other orders  -     Cancel: IRIS Diabetic eye exam         Subjective:      Patient ID: Alicia Lu is a 75 y o  male     Patient is here to follow up labs CT and acute Lyme, he treated with 14 days of doxycycline  He is feeling markedly improved he does still admit that he wakes up feeling nauseous he is trying to increase his calories could he does continue to lose weight  He has no other physical concerns  He does have chronic pain  Follows with pain management     He tried Paxil and Prozac Zoloft in the past which were all ineffective he will just continue to monitor symptoms off benzodiazepine since November   patient has O2 at home, he more commonly uses at nighttime because he does not walk very far distances during the day  Does have chronic shortness of breath        Current Outpatient Medications:     aspirin 81 MG tablet, Take 1 tablet by mouth daily, Disp: , Rfl:     atenolol (TENORMIN) 100 mg tablet, Take 1 tablet (100 mg total) by mouth 2 (two) times a day, Disp: 180 tablet, Rfl: 3    oxyCODONE (OXYCONTIN) 40 mg 12 hr tablet, Take 40 mg by mouth every 12 (twelve) hours , Disp: , Rfl:     oxyCODONE (ROXICODONE) 10 MG TABS, Take 10 mg by mouth every 8 (eight) hours , Disp: , Rfl:     pantoprazole (PROTONIX) 40 mg tablet, TAKE 1 TABLET BY MOUTH  DAILY, Disp: 90 tablet, Rfl: 3    pravastatin (PRAVACHOL) 20 mg tablet, Take 1 tablet (20 mg total) by mouth daily, Disp: 90 tablet, Rfl: 3    Resveratrol 50 MG CAPS, Take by mouth, Disp: , Rfl:     tacrolimus (PROGRAF) 1 mg capsule, Take 2 mg by mouth 2 (two) times a day, Disp: , Rfl:     triamcinolone (KENALOG) 0 1 % ointment, Apply topically 2 (two) times a day Applied to rash twice a day till clear, Disp: 454 g, Rfl: 1    triamcinolone (KENALOG) 0 5 % ointment, Apply topically 2 (two) times a day, Disp: 30 g, Rfl: 0    Calcium Carb-Cholecalciferol 1000-800 MG-UNIT TABS, Take 1 tablet by mouth, Disp: , Rfl:     Sennosides 15 MG TABS, Take by mouth, Disp: , Rfl:     No results found for this or any previous visit (from the past 1008 hour(s))  The following portions of the patient's history were reviewed and updated as appropriate: allergies, current medications, past family history, past medical history, past social history, past surgical history and problem list      Review of Systems   Constitutional: Negative for appetite change, chills, diaphoresis, fatigue, fever and unexpected weight change  HENT: Negative for postnasal drip and sneezing  Eyes: Negative for visual disturbance  Respiratory: Negative for chest tightness and shortness of breath      Cardiovascular: Negative for chest pain, palpitations and leg swelling  Gastrointestinal: Negative for abdominal pain and blood in stool  Endocrine: Negative for cold intolerance, heat intolerance, polydipsia, polyphagia and polyuria  Genitourinary: Negative for difficulty urinating, dysuria, frequency and urgency  Musculoskeletal: Positive for arthralgias and myalgias  Skin: Negative for rash and wound  Neurological: Negative for dizziness, weakness, light-headedness and headaches  Hematological: Negative for adenopathy  Psychiatric/Behavioral: Negative for confusion, dysphoric mood and sleep disturbance  The patient is not nervous/anxious  Objective:      /82 (BP Location: Left arm, Patient Position: Sitting, Cuff Size: Standard)   Pulse 60   Resp 16   Ht 6' (1 829 m)   Wt 70 1 kg (154 lb 9 6 oz)   BMI 20 97 kg/m²        Physical Exam   Constitutional: He is oriented to person, place, and time  No distress  cachectic   HENT:   Head: Normocephalic and atraumatic  Nose: Nose normal    Mouth/Throat: Oropharynx is clear and moist    Eyes: Pupils are equal, round, and reactive to light  Conjunctivae and EOM are normal    Neck: Normal range of motion  Neck supple  No JVD present  No tracheal deviation present  No thyromegaly present  Cardiovascular: Normal rate, regular rhythm, normal heart sounds and intact distal pulses  Exam reveals no gallop and no friction rub  No murmur heard  Pulses:       Dorsalis pedis pulses are 2+ on the right side, and 2+ on the left side  Posterior tibial pulses are 2+ on the right side, and 2+ on the left side  Pulmonary/Chest: Effort normal and breath sounds normal  No respiratory distress  He has no wheezes  He has no rales  Abdominal: Soft  Bowel sounds are normal  He exhibits no distension  There is no tenderness  Musculoskeletal: Normal range of motion  He exhibits no edema  Feet:   Right Foot:   Skin Integrity: Positive for dry skin   Negative for ulcer, skin breakdown, erythema, warmth or callus  Left Foot:   Skin Integrity: Positive for dry skin  Negative for ulcer, skin breakdown, erythema, warmth or callus  Lymphadenopathy:     He has no cervical adenopathy  Neurological: He is alert and oriented to person, place, and time  No cranial nerve deficit  Skin: Skin is warm and dry  No rash noted  He is not diaphoretic  Psychiatric: He has a normal mood and affect  His behavior is normal  Judgment and thought content normal      Tobacco Cessation Counseling: Tobacco cessation counseling was provided  The patient is sincerely urged to quit consumption of tobacco  He is not ready to quit tobacco  Medication options and side effects of medication discussed  Patient refused medication

## 2019-09-24 NOTE — TELEPHONE ENCOUNTER
Pt saw moises today, dropping off learner's permit application, put the form in her bin    Call back upon completion

## 2019-09-24 NOTE — PATIENT INSTRUCTIONS
Weight loss of unclear etiology no evidence of malignancies  I did recommend that if he continue with weight loss and nausea he should go back to see GI for an endoscopy continue PPI    Copd/ILD he uses O2 at bedtime, his O2 was 90% on room air at rest, he did drop during ambulation to 88 percent on room air  When placed on 2 liters of O2 he went up to 94 percent while ambulating  This was done during a chronic stable condition    We will attempt to recertify him for home O2     status post liver transplant following with transplant  Team     status post acute Lyme treated with 2 weeks of doxycycline symptoms are markedly improved     chronic pain following with pain management does have medical marijuana card

## 2019-09-26 DIAGNOSIS — J84.9 ILD (INTERSTITIAL LUNG DISEASE) (HCC): Primary | ICD-10-CM

## 2019-10-29 ENCOUNTER — CONSULT (OUTPATIENT)
Dept: PULMONOLOGY | Facility: CLINIC | Age: 64
End: 2019-10-29
Payer: MEDICARE

## 2019-10-29 VITALS
SYSTOLIC BLOOD PRESSURE: 140 MMHG | HEART RATE: 60 BPM | DIASTOLIC BLOOD PRESSURE: 80 MMHG | WEIGHT: 155 LBS | BODY MASS INDEX: 20.99 KG/M2 | OXYGEN SATURATION: 94 % | HEIGHT: 72 IN

## 2019-10-29 DIAGNOSIS — R06.02 SOB (SHORTNESS OF BREATH): Primary | ICD-10-CM

## 2019-10-29 DIAGNOSIS — J41.0 SIMPLE CHRONIC BRONCHITIS (HCC): ICD-10-CM

## 2019-10-29 DIAGNOSIS — Z72.0 TOBACCO USE: ICD-10-CM

## 2019-10-29 PROCEDURE — 99204 OFFICE O/P NEW MOD 45 MIN: CPT | Performed by: INTERNAL MEDICINE

## 2019-10-30 ENCOUNTER — TELEPHONE (OUTPATIENT)
Dept: INTERNAL MEDICINE CLINIC | Facility: CLINIC | Age: 64
End: 2019-10-30

## 2019-10-30 NOTE — TELEPHONE ENCOUNTER
Patient called stating that [facility] needs certification of medical necessity for oxygen so that they will be able to bill Medicare for the oxygen  Patient states he has to get renewal every year  Please check status   Patient call back # [phone]

## 2019-10-31 NOTE — TELEPHONE ENCOUNTER
Ask brandie tomorrow- I believe she took care of it when he was in for his visit otherwise just fax them my note

## 2019-11-04 NOTE — PROGRESS NOTES
Assessment/Plan:     Diagnoses and all orders for this visit:    SOB (shortness of breath)  -     Complete PFT with post Bronchodilator and Six Minute walk; Future    Simple chronic bronchitis (HCC)    Tobacco use      shortness of breath and dyspnea on exertion with extensive smoking history still actively smoking, recently needing 2 L of oxygen by nasal cannula currently using it only at nighttime, CT of the chest with moderate paraseptal and centrilobular emphysematous changes, scattered densities in reticulations in the right middle lobe and lingula, faint ground-glass densities noted throughout the lung bases  Discussed CT of the chest changes the wounds infectious versus inflammatory etiology,??  Chronic aspiration, ?? Atypical infection  Would need repeat CT of the chest to be done for resolution of the opacities in 6-8 weeks  Complete lung function testing with 6 minutes walk test  Smoking cessation discussed he states he is trying to cut down on his own currently  Will follow up 6 weeks or p r n  Earlier as needed  Return in about 6 weeks (around 12/10/2019)  All questions are answered to the patient's satisfaction and understanding  He verbalizes understanding  He is encouraged to call with any further questions or concerns  Portions of the record may have been created with voice recognition software  Occasional wrong word or "sound a like" substitutions may have occurred due to the inherent limitations of voice recognition software  Read the chart carefully and recognize, using context, where substitutions have occurred  a    Electronically Signed by Sebastián Henriquez MD    ______________________________________________________________________    Chief Complaint:   Chief Complaint   Patient presents with    COPD     new pt ref- kedar ellison         Patient ID: Alejandra Brooke is a 75 y o  y o  male has a past medical history of Abnormal electrocardiogram, Abnormal findings on radiological examination of gastrointestinal tract, Abnormal liver enzymes, Acute hepatitis C, Adrenal disorder ([address] ), Aneurysm of unspecified site [facility]), Benign neoplasm of skin, Bronchopneumonia, Chronic hepatitis C (HCC), Chronic obstructive asthma ([address] ), Cirrhosis ([address] ), Colon, diverticulosis, COPD (chronic obstructive pulmonary disease) ([address] ), Depression, Diabetes mellitus ([address] ), Drug dependence, continuous abuse ([address] ), Hyperlipidemia, Hypertension, Laennec's cirrhosis (alcoholic) ([address] ), Malabsorption syndrome, Mitral valve disorder, Murmur, Nonspecific abnormal finding on cardiac evaluation, Peripheral vascular disease ([address] ), Pleural effusion, Pneumonia, Rectal hemorrhage, Renal failure, Respiratory abnormality, Restrictive lung disease, Testicular dysfunction, Tricuspid valve disorders, non-rheumatic, Type 2 diabetes mellitus ([address] ), and Ventral hernia  10/29/2019  Patient presents today for initial visit  Patient is a very pleasant 13-year-old gentleman, with extensive smoking history who few years ago and restarted again, currently smoking less than half a pack anion wants to quit on his own  He has past medical history significant for liver transplant  Patient has been complaining of shortness of breath and dyspnea on exertion, and also states recently he was given oxygen 2 liters/minute continuous less than a month ago and he states that although he initially used it continuously, currently only using it at nighttime  Occupational/Exposure history:  Pets/Enviroment:  Travel history:  Review of Systems   Constitutional: Positive for fatigue  Negative for appetite change, chills, diaphoresis, fever and unexpected weight change  HENT: Negative for congestion, ear discharge, ear pain, nosebleeds, postnasal drip, rhinorrhea, sinus pain, sore throat and voice change  Eyes: Negative for pain, discharge and visual disturbance  Respiratory: Positive for cough and shortness of breath   Negative for apnea, choking, chest tightness, wheezing and stridor  Cardiovascular: Negative for chest pain, palpitations and leg swelling  Gastrointestinal: Negative for abdominal pain, blood in stool, constipation, diarrhea and vomiting  Endocrine: Negative for cold intolerance, heat intolerance, polydipsia, polyphagia and polyuria  Genitourinary: Negative for difficulty urinating and dysuria  Musculoskeletal: Negative for arthralgias and neck pain  Skin: Negative for pallor and rash  Allergic/Immunologic: Negative for environmental allergies and food allergies  Neurological: Negative for dizziness, speech difficulty, weakness and light-headedness  Hematological: Negative for adenopathy  Does not bruise/bleed easily  Psychiatric/Behavioral: Negative for agitation, confusion and sleep disturbance  The patient is not nervous/anxious  Social history: He reports that he has been smoking cigarettes  He has a 10 00 pack-year smoking history  He has never used smokeless tobacco  He reports that he has current or past drug history  Drug: Marijuana  He reports that he does not drink alcohol      Past surgical history:   Past Surgical History:   Procedure Laterality Date    CHOLECYSTECTOMY      GALLBLADDER SURGERY      HEPATITIS B SURFACE AB QN,LIVER TRANSPLANT (HISTORICAL)      HERNIA REPAIR       Family history:   Family History   Problem Relation Age of Onset    Lung cancer Mother         overlapping sites of lung unspecified laterality     Heart disease Father         cardiac disorder    Arthritis Family     Cancer Family     Liver disease Family         chronic    Coronary artery disease Family     Diabetes Family        Immunization History   Administered Date(s) Administered    Hep A, adult 09/13/2002    Hep B, adult 09/13/2002    INFLUENZA 10/17/2011, 11/17/2015    Influenza Quadrivalent, 6-35 Months IM 10/21/2014, 11/17/2015    Influenza, high dose seasonal 0 5 mL 09/19/2019    Pneumococcal Conjugate 13-Valent 05/02/2015    Pneumococcal Polysaccharide PPV23 09/13/2002, 07/08/2018    Tdap 11/17/2004     Current Outpatient Medications   Medication Sig Dispense Refill    aspirin 81 MG tablet Take 1 tablet by mouth daily      atenolol (TENORMIN) 100 mg tablet Take 1 tablet (100 mg total) by mouth 2 (two) times a day 180 tablet 3    oxyCODONE (OXYCONTIN) 40 mg 12 hr tablet Take 40 mg by mouth every 12 (twelve) hours       oxyCODONE (ROXICODONE) 10 MG TABS Take 10 mg by mouth every 8 (eight) hours       pantoprazole (PROTONIX) 40 mg tablet TAKE 1 TABLET BY MOUTH  DAILY 90 tablet 3    pravastatin (PRAVACHOL) 20 mg tablet Take 1 tablet (20 mg total) by mouth daily 90 tablet 3    tacrolimus (PROGRAF) 1 mg capsule Take 2 mg by mouth 2 (two) times a day      triamcinolone (KENALOG) 0 1 % ointment Apply topically 2 (two) times a day Applied to rash twice a day till clear 454 g 1    triamcinolone (KENALOG) 0 5 % ointment Apply topically 2 (two) times a day 30 g 0    Calcium Carb-Cholecalciferol 1000-800 MG-UNIT TABS Take 1 tablet by mouth      Resveratrol 50 MG CAPS Take by mouth      Sennosides 15 MG TABS Take by mouth       No current facility-administered medications for this visit  Allergies: Other; Grass extracts [gramineae pollens]; Pollen extract; and Prozac [fluoxetine]    Objective:  Vitals:    10/29/19 0847   BP: 140/80   Pulse: 60   SpO2: 94%   Weight: 70 3 kg (155 lb)   Height: 6' (1 829 m)   Oxygen Therapy  SpO2: 94 %    Wt Readings from Last 3 Encounters:   10/29/19 70 3 kg (155 lb)   09/24/19 70 1 kg (154 lb 9 6 oz)   09/05/19 71 8 kg (158 lb 3 2 oz)     Body mass index is 21 02 kg/m²  Physical Exam   Constitutional: He is oriented to person, place, and time  He appears well-developed and well-nourished  HENT:   Head: Normocephalic and atraumatic  Eyes: Pupils are equal, round, and reactive to light  Conjunctivae are normal    Neck: Normal range of motion  Neck supple   No JVD present  No thyromegaly present  Cardiovascular: Normal rate, regular rhythm and normal heart sounds  Exam reveals no gallop and no friction rub  No murmur heard  Pulmonary/Chest: Effort normal and breath sounds normal  No respiratory distress  He has no wheezes  He has no rales  He exhibits no tenderness  Abdominal: Soft  Bowel sounds are normal    Musculoskeletal: Normal range of motion  He exhibits no edema, tenderness or deformity  Lymphadenopathy:     He has no cervical adenopathy  Neurological: He is alert and oriented to person, place, and time  Skin: Skin is warm and dry  Psychiatric: He has a normal mood and affect  Nursing note and vitals reviewed  Avinash Munoz

## 2019-12-23 ENCOUNTER — TELEPHONE (OUTPATIENT)
Dept: INTERNAL MEDICINE CLINIC | Facility: CLINIC | Age: 64
End: 2019-12-23

## 2019-12-23 NOTE — TELEPHONE ENCOUNTER
[facility] medical equipment called stating that the office note on 9/24 needs to be corrected in order for patient to receive his O2  Underneath assessment patient instructions; it must state;   desaturation during and ambulation to 88 percent in room air  And also when placed on 2 liters of O2 went up to 94 percent in room air       Please fax to FAX#  [phone]    Any questions please contact [address] [place] [address] at  [phone]

## 2019-12-23 NOTE — TELEPHONE ENCOUNTER
So I added "room air" after the 88 percent but I wont make sense if I add it anyplace else    He was 94% after I put him on o2    So not sure what they want

## 2020-01-13 ENCOUNTER — TELEPHONE (OUTPATIENT)
Dept: INTERNAL MEDICINE CLINIC | Facility: CLINIC | Age: 65
End: 2020-01-13

## 2020-01-13 NOTE — TELEPHONE ENCOUNTER
[facility] CALLED BACK AND STATED FIRST PART NEEDS TO AT REST  ON ROOM AIR AND 3RD PART NEEDS TO STATE 94% ON 2 LITERS WHILE AMBULATING AND ACUTE NEEDS TO BE TASKEN OUT AND ADDENDUM NEEDS TO STATE PT  WAS TESTED AND  IS IN CHRONIC STABLE CONDITION

## 2020-01-13 NOTE — TELEPHONE ENCOUNTER
Truong Campbell called from [employer] the form that was faxed to him needs some corrections  They need to know if the 94 percent was at rest or ambulation and must say on room air  latesly any notes stating patient was  tested chronic stable state         Fax [phone]  Please call [facility] number- [phone]

## 2020-01-13 NOTE — TELEPHONE ENCOUNTER
This message is confusing  He was 88 % on room air while walking, the we placed him on 2l and while walking he was 94 % this was in a chronic stable condition  The 88 % was on room air and is documented     Is the only thing they need is for me to add " 94% on 2L while ambulating"

## 2020-02-17 ENCOUNTER — TELEPHONE (OUTPATIENT)
Dept: INTERNAL MEDICINE CLINIC | Facility: CLINIC | Age: 65
End: 2020-02-17

## 2020-02-17 NOTE — TELEPHONE ENCOUNTER
Anju Chamorro gave him a test to check his ox, pt thought it was Sept 2019  But he got a call from [facility] saying the date they were sent was from 2017, & need this more current date  Gilson Penaloza He didn't have the fax # just the phone number of the facility that called him & left him this message       [id number] [facility] / [phone]

## 2020-02-17 NOTE — TELEPHONE ENCOUNTER
Spoke with [facility]     They faxed over a new form 2/14/2020    The form was filled out with the wrong year on it  Should of been 2019 not 2017    The initial form can be changed & singed & dated with change or re filled out      Then faxed back       We have new form filled out just need Dr Emil Zuniga to sign & we will fax back

## 2020-02-20 ENCOUNTER — CONSULT (OUTPATIENT)
Dept: CARDIOLOGY CLINIC | Facility: CLINIC | Age: 65
End: 2020-02-20
Payer: MEDICARE

## 2020-02-20 VITALS
OXYGEN SATURATION: 100 % | SYSTOLIC BLOOD PRESSURE: 144 MMHG | HEIGHT: 72 IN | DIASTOLIC BLOOD PRESSURE: 80 MMHG | HEART RATE: 96 BPM | BODY MASS INDEX: 20.72 KG/M2 | WEIGHT: 153 LBS

## 2020-02-20 DIAGNOSIS — R06.00 DYSPNEA ON EXERTION: ICD-10-CM

## 2020-02-20 DIAGNOSIS — I10 ESSENTIAL HYPERTENSION: ICD-10-CM

## 2020-02-20 DIAGNOSIS — I73.9 CLAUDICATION OF LOWER EXTREMITY (HCC): ICD-10-CM

## 2020-02-20 DIAGNOSIS — K21.00 ESOPHAGITIS, REFLUX: ICD-10-CM

## 2020-02-20 DIAGNOSIS — I25.10 ATHEROSCLEROSIS OF NATIVE CORONARY ARTERY OF NATIVE HEART WITHOUT ANGINA PECTORIS: Primary | ICD-10-CM

## 2020-02-20 DIAGNOSIS — J44.9 CHRONIC OBSTRUCTIVE PULMONARY DISEASE, UNSPECIFIED COPD TYPE (HCC): ICD-10-CM

## 2020-02-20 DIAGNOSIS — E78.2 MIXED HYPERLIPIDEMIA: ICD-10-CM

## 2020-02-20 DIAGNOSIS — Z94.4 LIVER TRANSPLANTED (HCC): ICD-10-CM

## 2020-02-20 PROCEDURE — 93000 ELECTROCARDIOGRAM COMPLETE: CPT | Performed by: INTERNAL MEDICINE

## 2020-02-20 PROCEDURE — 99204 OFFICE O/P NEW MOD 45 MIN: CPT | Performed by: INTERNAL MEDICINE

## 2020-02-20 NOTE — PROGRESS NOTES
Cardiology Consultation     Teja Ozuna  [id number]  1955  [address] [facility] [address]    1  Atherosclerosis of native coronary artery of native heart without angina pectoris  POCT ECG    Echo complete with contrast if indicated    NM myocardial perfusion spect (rx stress and/or rest)   2  Mixed hyperlipidemia     3  Essential hypertension  POCT ECG    Echo complete with contrast if indicated    NM myocardial perfusion spect (rx stress and/or rest)   4  Liver transplanted ([facility] )     5  Esophagitis, reflux     6  Chronic obstructive pulmonary disease, unspecified COPD type ([facility] )     7  Dyspnea on exertion  POCT ECG    Echo complete with contrast if indicated    NM myocardial perfusion spect (rx stress and/or rest)   8  Claudication of lower extremity [facility])         Chief Complaint:   shortness of breath, preoperative cardiac clearance for  Knee surgery    HPI:   27-year-old male with  Coronary artery disease( cardiac catheterization in 2015 showed  50% stenosis of circumflex without any intervention), hyperlipidemia, hypertension, COPD, aneurysm of unknown artery questionable status post more than 10 years back, history of hepatitis-C status post liver transplant in 2004 was referred for preoperative cardiac risk assessment for knee surgery  patient is having shortness of breath on exertion which is chronic but lately it has progressed  He gets short of breath easily on taking flight of stairs and exercise tolerance has decreased lately  He gets significant dizziness with feeling of passing out /shortness of breath when he bends over  Denies chest pain, leg edema, orthopnea, paroxysmal nocturnal dyspnea, diaphoresis or loss of consciousness  Patient denies any cardiac testing after  His last catheterization in 2015    He tells me that he had some stent placed probably in the groin not sure more than 10 years back and before his cardiac catheterization  Patient intermittently gets bilateral lower extremity calf pain on ambulation and  resolves on resting  Denies any local lower extremity skin changes or ulceration  Patient has further workup probably YADI and arterial ultrasound duplex ordered by Orthopedics at [facility]   vitals including labs reviewed  As per patient home blood pressure is controlled  LDL 55 in September of 2019   home medications reviewed with patient  Social History:    Active smoker smokes 5 cigarettes per day was a heavy smoker more than 50 years, ex alcoholic quit since liver transplant, denies illicit drug use  Family History: father had  quadriple coronary artery bypass in his 62s, he had CVA      Review of Systems:   review of system negative except as mentioned above    Patient Active Problem List   Diagnosis    Anxiety    Arteriosclerosis of coronary artery    Chronic obstructive pulmonary disease ([facility] )    Esophagitis, reflux    Hyperlipidemia    Hypertension    Immunosuppression ([facility] )    Primary osteoarthritis of left knee    Liver transplanted ([facility] )    Impaired fasting glucose    Idiopathic peripheral neuropathy    Tobacco abuse    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)    Hepatic fibrosis    Blood clotting disorder (HCC)     Past Medical History:   Diagnosis Date    Abnormal electrocardiogram     Abnormal findings on radiological examination of gastrointestinal tract     Abnormal liver enzymes     Acute hepatitis C     Adrenal disorder (HCC)     Aneurysm of unspecified site ([facility] )     Benign neoplasm of skin     Bronchopneumonia     Chronic hepatitis C (HCC)     Chronic obstructive asthma (HCC)     Cirrhosis (HCC)     Colon, diverticulosis     COPD (chronic obstructive pulmonary disease) ([facility] )     Depression     Diabetes mellitus ([facility] )     Drug dependence, continuous abuse ([facility] )     Hyperlipidemia     Hypertension     Laennec's cirrhosis (alcoholic) (HCC)     Malabsorption syndrome     Mitral valve disorder     Murmur     Nonspecific abnormal finding on cardiac evaluation     Peripheral vascular disease (HCC)     Pleural effusion     Pneumonia     Rectal hemorrhage     Renal failure     Respiratory abnormality     Restrictive lung disease     lung disease other not elsewhere class     Testicular dysfunction     testicular hypfunction other     Tricuspid valve disorders, non-rheumatic     Type 2 diabetes mellitus (HCC)     uncomplicated controlled      Ventral hernia      Social History     Socioeconomic History    Marital status:      Spouse name: Not on file    Number of children: Not on file    Years of education: Not on file    Highest education level: Not on file   Occupational History    Not on file   Social Needs    Financial resource strain: Not on file    Food insecurity:     Worry: Not on file     Inability: Not on file    Transportation needs:     Medical: Not on file     Non-medical: Not on file   Tobacco Use    Smoking status: Current Every Day Smoker     Packs/day: 0 25     Years: 40 00     Pack years: 10 00     Types: Cigarettes    Smokeless tobacco: Never Used    Tobacco comment: 5 a day    Substance and Sexual Activity    Alcohol use: No     Comment: no alcohol use     Drug use: Yes     Types: Marijuana     Comment: medical marijuana    Sexual activity: Never   Lifestyle    Physical activity:     Days per week: 0 days     Minutes per session: 0 min    Stress: Not at all   Relationships    Social connections:     Talks on phone: Not on file     Gets together: Not on file     Attends Jain service: Not on file     Active member of club or organization: Not on file     Attends meetings of clubs or organizations: Not on file     Relationship status: Not on file    Intimate partner violence:     Fear of current or ex partner: Not on file     Emotionally abused: Not on file     Physically abused: Not on file     Forced sexual activity: Not on file   Other Topics Concern    Not on file   Social History Narrative    Disability     Lives with adult children       Family History   Problem Relation Age of Onset    Lung cancer Mother         overlapping sites of lung unspecified laterality     Heart disease Father         cardiac disorder    Arthritis Family     Cancer Family     Liver disease Family         chronic    Coronary artery disease Family     Diabetes Family      Past Surgical History:   Procedure Laterality Date    CHOLECYSTECTOMY      GALLBLADDER SURGERY      HEPATITIS B SURFACE AB QN,LIVER TRANSPLANT (HISTORICAL)      HERNIA REPAIR         Current Outpatient Medications:     aspirin 81 MG tablet, Take 1 tablet by mouth daily, Disp: , Rfl:     atenolol (TENORMIN) 100 mg tablet, Take 1 tablet (100 mg total) by mouth 2 (two) times a day, Disp: 180 tablet, Rfl: 3    Calcium Carb-Cholecalciferol 1000-800 MG-UNIT TABS, Take 1 tablet by mouth, Disp: , Rfl:     oxyCODONE (OXYCONTIN) 40 mg 12 hr tablet, Take 40 mg by mouth every 12 (twelve) hours , Disp: , Rfl:     oxyCODONE (ROXICODONE) 10 MG TABS, Take 10 mg by mouth every 8 (eight) hours , Disp: , Rfl:     pantoprazole (PROTONIX) 40 mg tablet, TAKE 1 TABLET BY MOUTH  DAILY, Disp: 90 tablet, Rfl: 3    pravastatin (PRAVACHOL) 20 mg tablet, Take 1 tablet (20 mg total) by mouth daily, Disp: 90 tablet, Rfl: 3    tacrolimus (PROGRAF) 1 mg capsule, Take 2 mg by mouth 2 (two) times a day, Disp: , Rfl:     triamcinolone (KENALOG) 0 1 % ointment, Apply topically 2 (two) times a day Applied to rash twice a day till clear, Disp: 454 g, Rfl: 1    triamcinolone (KENALOG) 0 5 % ointment, Apply topically 2 (two) times a day, Disp: 30 g, Rfl: 0  Allergies   Allergen Reactions    Dust Mite Extract Cough, Eye Swelling, Itching and Wheezing    Muscle Relief  [Capsaicin] Confusion, Headache, Lightheadedness, Palpitations, Tinnitus and Vomiting    Other      Muscle relaxer's causes vomiting and nausea     Grass Extracts [Gramineae Pollens] Hives and Sneezing    Pollen Extract Hives and Sneezing     Watery Eyes    Prozac [Fluoxetine]      Vitals:    02/20/20 1114   BP: 144/80   Pulse: 96   SpO2: 100%   Weight: 69 4 kg (153 lb)   Height: 6' (1 829 m)         Labs:  Office Visit on 09/05/2019   Component Date Value    Color, UA 09/05/2019 Yellow     Clarity, UA 09/05/2019 Clear     Specific Gravity, UA 09/05/2019 1 017     pH, UA 09/05/2019 6 5     Leukocytes, UA 09/05/2019 Negative     Nitrite, UA 09/05/2019 Negative     Protein, UA 09/05/2019 30 (1+)*    Glucose, UA 09/05/2019 Negative     Ketones, UA 09/05/2019 Negative     Urobilinogen, UA 09/05/2019 1 0     Bilirubin, UA 09/05/2019 Negative     Blood, UA 09/05/2019 Negative     RBC, UA 09/05/2019 None Seen     WBC, UA 09/05/2019 None Seen     Epithelial Cells 09/05/2019 None Seen     Bacteria, UA 09/05/2019 None Seen     Hyaline Casts, UA 09/05/2019 None Seen    Appointment on 09/05/2019   Component Date Value    Amylase 09/05/2019 58     Lipase 09/05/2019 117     N gonorrhoeae, DNA Probe 09/05/2019 Negative     Chlamydia trachomatis, D* 09/05/2019 Negative     HIV-1/HIV-2 Ab 09/05/2019 Non-Reactive     LD 09/05/2019 217     Urine Culture 09/05/2019 No Growth <1000 cfu/mL    Appointment on 09/03/2019   Component Date Value    WBC 09/03/2019 6 46     RBC 09/03/2019 3 82*    Hemoglobin 09/03/2019 12 1     Hematocrit 09/03/2019 36 9     MCV 09/03/2019 97     MCH 09/03/2019 31 7     MCHC 09/03/2019 32 8     RDW 09/03/2019 12 6     MPV 09/03/2019 10 7     Platelets 53/97/6054 211     nRBC 09/03/2019 0     Neutrophils Relative 09/03/2019 58     Immat GRANS % 09/03/2019 0     Lymphocytes Relative 09/03/2019 31     Monocytes Relative 09/03/2019 5     Eosinophils Relative 09/03/2019 5     Basophils Relative 09/03/2019 1     Neutrophils Absolute 09/03/2019 3 66     Immature Grans Absolute 09/03/2019 0 01     Lymphocytes Absolute 09/03/2019 2 03     Monocytes Absolute 09/03/2019 0 33     Eosinophils Absolute 09/03/2019 0 35     Basophils Absolute 09/03/2019 0 08     Sodium 09/03/2019 134*    Potassium 09/03/2019 4 3     Chloride 09/03/2019 101     CO2 09/03/2019 22     ANION GAP 09/03/2019 11     BUN 09/03/2019 13     Creatinine 09/03/2019 1 08     Glucose 09/03/2019 119     Calcium 09/03/2019 9 0     AST 09/03/2019 15     ALT 09/03/2019 14     Alkaline Phosphatase 09/03/2019 66     Total Protein 09/03/2019 7 8     Albumin 09/03/2019 3 7     Total Bilirubin 09/03/2019 0 62     eGFR 09/03/2019 71     Cholesterol 09/03/2019 112     Triglycerides 09/03/2019 102     HDL, Direct 09/03/2019 37*    LDL Calculated 09/03/2019 55     Non-HDL-Chol (CHOL-HDL) 09/03/2019 75     Hemoglobin A1C 09/03/2019 5 6     EAG 09/03/2019 114     TSH 3RD GENERATON 09/03/2019 1 090     Creatinine, Ur 09/05/2019 129 0     Microalbum  ,U,Random 09/05/2019 163 0*    Microalb Creat Ratio 09/05/2019 126*    PSA 09/03/2019 0 2      Imaging: No results found  Physical Exam:  General:  thin, awake, alert and oriented x3, not in distress  Neck: supple, no JVD  Eyes:conjunctiva normal  Lungs:  Bilateral air entry positive, no wheeze/rhonchi or crackle  Heart:  S1-S2 normal, no murmur  Abdomen:  Soft ,nondistended ,nontender, bowel sounds positive  Extremities:  No leg edema, distal DP pulse feeble, skin of lower extremity warm  Neuro:  Moving all extremities, speech clear  Skin: warm, no rash    /80   Pulse 96   Ht 6' (1 829 m)   Wt 69 4 kg (153 lb)   SpO2 100%   BMI 20 75 kg/m²       Cardiographics :  ECG:  Sinus bradycardia with first-degree AV block, heart rate 55, normal axis, right bundle-branch block, nonspecific ST wave changes      Assessment:    1    Dyspnea on exertion   patient denies chest pain or shortness of breath at rest   Patient has chronic shortness of breath which has got worse lately with decrease in exercise tolerance  Clinically patient is not in heart failure  Patient does have underlying COPD and continues to smoke  Cardiac catheterization in 2015 showed circumflex 50% stenosis    2  Preoperative cardiac risk assessment for  Knee surgery    3  Coronary artery disease   cardiac catheterization in 2015 showed circumflex 50% stenosis  Patient did not have any further cardiac workup since then    4  Bilateral calf pain   pain  suggestive of claudication  Patient  Is chronic active smoker  Further workup is ordered by Orthopedic as per patient  Is advised to bring record on next visit    4  Hypertension   Blood pressure in the clinic shows elevated blood pressure reading  As per patient home blood pressure reading is controlled    5  Hyperlipidemia on statin  6  Active smoker  7  COPD follows with Pulmonary physician  8  Hepatitis-C /liver transplant in 2004    Recommendations:   2D echocardiogram to evaluate for structural heart disease   Patient with previous history of coronary artery disease including multiple cardiac risk factors and above symptoms will need further evaluation of coronary artery disease -  Lexiscan MPI stress test for further evaluation  Patient cannot walk fast due to knee problems and symptoms   patient to continue aspirin, atenolol, statin   patient advised to monitor blood pressure at home and bring results on next visit   patient will also bring YADI /arter[facility] duplex [facility] once results are available   patient advised to take low-salt, low-fat /low-cholesterol diet   further recommendation regarding preoperative cardiac risk assessment for knee surgery after above test results   above all discussed with patient  Patient understands and agrees     Addendum note  02/26/2020  Echo and stress test result discussed with the patient    Cardiac stress test limitation explained to the patient and patient understands  Patient is at intermediate risk for perioperative cardiac complications for knee surgery due to his underlying comorbidities  Patient to continue his current med  Patient to follow-up in the clinic in 6 months  Above all discussed with patient    Patient understands and agrees

## 2020-02-21 ENCOUNTER — HOSPITAL ENCOUNTER (OUTPATIENT)
Dept: NON INVASIVE DIAGNOSTICS | Facility: CLINIC | Age: 65
Discharge: HOME | End: 2020-02-21
Payer: MEDICARE

## 2020-02-21 DIAGNOSIS — I25.10 ATHEROSCLEROSIS OF NATIVE CORONARY ARTERY OF NATIVE HEART WITHOUT ANGINA PECTORIS: ICD-10-CM

## 2020-02-21 DIAGNOSIS — I10 ESSENTIAL HYPERTENSION: ICD-10-CM

## 2020-02-21 DIAGNOSIS — R06.00 DYSPNEA ON EXERTION: ICD-10-CM

## 2020-02-21 PROCEDURE — 78452 HT MUSCLE IMAGE SPECT MULT: CPT | Performed by: INTERNAL MEDICINE

## 2020-02-21 PROCEDURE — A9502 TC99M TETROFOSMIN: HCPCS

## 2020-02-21 PROCEDURE — 93016 CV STRESS TEST SUPVJ ONLY: CPT | Performed by: INTERNAL MEDICINE

## 2020-02-21 PROCEDURE — 93306 TTE W/DOPPLER COMPLETE: CPT | Performed by: INTERNAL MEDICINE

## 2020-02-21 PROCEDURE — 93017 CV STRESS TEST TRACING ONLY: CPT

## 2020-02-21 PROCEDURE — 93306 TTE W/DOPPLER COMPLETE: CPT

## 2020-02-21 PROCEDURE — 93018 CV STRESS TEST I&R ONLY: CPT | Performed by: INTERNAL MEDICINE

## 2020-02-21 PROCEDURE — 78452 HT MUSCLE IMAGE SPECT MULT: CPT

## 2020-02-21 RX ADMIN — REGADENOSON 0.4 MG: 0.08 INJECTION, SOLUTION INTRAVENOUS at 13:09

## 2020-02-24 ENCOUNTER — TRANSCRIBE ORDERS (OUTPATIENT)
Dept: ADMINISTRATIVE | Facility: HOSPITAL | Age: 65
End: 2020-02-24

## 2020-02-24 ENCOUNTER — APPOINTMENT (OUTPATIENT)
Dept: LAB | Facility: CLINIC | Age: 65
End: 2020-02-24
Payer: MEDICARE

## 2020-02-24 ENCOUNTER — TELEPHONE (OUTPATIENT)
Dept: INTERNAL MEDICINE CLINIC | Facility: CLINIC | Age: 65
End: 2020-02-24

## 2020-02-24 ENCOUNTER — HOSPITAL ENCOUNTER (OUTPATIENT)
Dept: CT IMAGING | Facility: CLINIC | Age: 65
Discharge: HOME | End: 2020-02-24
Payer: MEDICARE

## 2020-02-24 ENCOUNTER — CONSULT (OUTPATIENT)
Dept: INTERNAL MEDICINE CLINIC | Facility: CLINIC | Age: 65
End: 2020-02-24
Payer: MEDICARE

## 2020-02-24 VITALS
SYSTOLIC BLOOD PRESSURE: 140 MMHG | DIASTOLIC BLOOD PRESSURE: 72 MMHG | RESPIRATION RATE: 14 BRPM | HEIGHT: 72 IN | HEART RATE: 78 BPM | WEIGHT: 156.4 LBS | BODY MASS INDEX: 21.18 KG/M2

## 2020-02-24 DIAGNOSIS — I25.10 ARTERIOSCLEROSIS OF CORONARY ARTERY: ICD-10-CM

## 2020-02-24 DIAGNOSIS — I25.10 CORONARY ARTERY DISEASE WITHOUT ANGINA PECTORIS, UNSPECIFIED VESSEL OR LESION TYPE, UNSPECIFIED WHETHER NATIVE OR TRANSPLANTED HEART: ICD-10-CM

## 2020-02-24 DIAGNOSIS — M25.562 LEFT KNEE PAIN, UNSPECIFIED CHRONICITY: Primary | ICD-10-CM

## 2020-02-24 DIAGNOSIS — E78.2 MIXED HYPERLIPIDEMIA: ICD-10-CM

## 2020-02-24 DIAGNOSIS — I10 ESSENTIAL HYPERTENSION: ICD-10-CM

## 2020-02-24 DIAGNOSIS — R73.01 IMPAIRED FASTING GLUCOSE: ICD-10-CM

## 2020-02-24 DIAGNOSIS — Z72.0 TOBACCO ABUSE: ICD-10-CM

## 2020-02-24 DIAGNOSIS — J44.9 CHRONIC OBSTRUCTIVE PULMONARY DISEASE, UNSPECIFIED COPD TYPE (HCC): ICD-10-CM

## 2020-02-24 DIAGNOSIS — J84.9 ILD (INTERSTITIAL LUNG DISEASE) (HCC): ICD-10-CM

## 2020-02-24 DIAGNOSIS — Z01.818 PREOP EXAMINATION: ICD-10-CM

## 2020-02-24 DIAGNOSIS — Z94.4 LIVER TRANSPLANTED (HCC): Primary | ICD-10-CM

## 2020-02-24 DIAGNOSIS — K21.00 ESOPHAGITIS, REFLUX: ICD-10-CM

## 2020-02-24 LAB
ALBUMIN SERPL BCP-MCNC: 3.8 G/DL (ref 3.5–5)
ALP SERPL-CCNC: 85 U/L (ref 46–116)
ALT SERPL W P-5'-P-CCNC: 18 U/L (ref 12–78)
ANION GAP SERPL CALCULATED.3IONS-SCNC: 4 MMOL/L (ref 4–13)
AST SERPL W P-5'-P-CCNC: 17 U/L (ref 5–45)
BASOPHILS # BLD AUTO: 0.06 THOUSANDS/ΜL (ref 0–0.1)
BASOPHILS NFR BLD AUTO: 1 % (ref 0–1)
BILIRUB SERPL-MCNC: 0.31 MG/DL (ref 0.2–1)
BUN SERPL-MCNC: 14 MG/DL (ref 5–25)
CALCIUM SERPL-MCNC: 8.7 MG/DL (ref 8.3–10.1)
CHLORIDE SERPL-SCNC: 101 MMOL/L (ref 100–108)
CHOLEST SERPL-MCNC: 121 MG/DL (ref 50–200)
CO2 SERPL-SCNC: 29 MMOL/L (ref 21–32)
CREAT SERPL-MCNC: 1.01 MG/DL (ref 0.6–1.3)
EOSINOPHIL # BLD AUTO: 0.24 THOUSAND/ΜL (ref 0–0.61)
EOSINOPHIL NFR BLD AUTO: 4 % (ref 0–6)
ERYTHROCYTE [DISTWIDTH] IN BLOOD BY AUTOMATED COUNT: 12.7 % (ref 11.6–15.1)
EST. AVERAGE GLUCOSE BLD GHB EST-MCNC: 114 MG/DL
GFR SERPL CREATININE-BSD FRML MDRD: 77 ML/MIN/1.73SQ M
GLUCOSE P FAST SERPL-MCNC: 119 MG/DL (ref 65–99)
HBA1C MFR BLD: 5.6 %
HCT VFR BLD AUTO: 40.1 % (ref 36.5–49.3)
HDLC SERPL-MCNC: 43 MG/DL
HGB BLD-MCNC: 12.9 G/DL (ref 12–17)
IMM GRANULOCYTES # BLD AUTO: 0.01 THOUSAND/UL (ref 0–0.2)
IMM GRANULOCYTES NFR BLD AUTO: 0 % (ref 0–2)
LDLC SERPL CALC-MCNC: 57 MG/DL (ref 0–100)
LYMPHOCYTES # BLD AUTO: 1.92 THOUSANDS/ΜL (ref 0.6–4.47)
LYMPHOCYTES NFR BLD AUTO: 34 % (ref 14–44)
MAX DIASTOLIC BP: 82 MMHG
MAX HEART RATE: 64 BPM
MAX PREDICTED HEART RATE: 153 BPM
MAX. SYSTOLIC BP: 178 MMHG
MCH RBC QN AUTO: 31.8 PG (ref 26.8–34.3)
MCHC RBC AUTO-ENTMCNC: 32.2 G/DL (ref 31.4–37.4)
MCV RBC AUTO: 99 FL (ref 82–98)
MONOCYTES # BLD AUTO: 0.28 THOUSAND/ΜL (ref 0.17–1.22)
MONOCYTES NFR BLD AUTO: 5 % (ref 4–12)
NEUTROPHILS # BLD AUTO: 3.18 THOUSANDS/ΜL (ref 1.85–7.62)
NEUTS SEG NFR BLD AUTO: 56 % (ref 43–75)
NONHDLC SERPL-MCNC: 78 MG/DL
NRBC BLD AUTO-RTO: 0 /100 WBCS
PLATELET # BLD AUTO: 189 THOUSANDS/UL (ref 149–390)
PMV BLD AUTO: 11.2 FL (ref 8.9–12.7)
POTASSIUM SERPL-SCNC: 4.5 MMOL/L (ref 3.5–5.3)
PROT SERPL-MCNC: 7.4 G/DL (ref 6.4–8.2)
PROTOCOL NAME: NORMAL
RBC # BLD AUTO: 4.06 MILLION/UL (ref 3.88–5.62)
REASON FOR TERMINATION: NORMAL
SODIUM SERPL-SCNC: 134 MMOL/L (ref 136–145)
TARGET HR FORMULA: NORMAL
TIME IN EXERCISE PHASE: NORMAL
TRIGL SERPL-MCNC: 103 MG/DL
TSH SERPL DL<=0.05 MIU/L-ACNC: 1.94 UIU/ML (ref 0.36–3.74)
WBC # BLD AUTO: 5.69 THOUSAND/UL (ref 4.31–10.16)

## 2020-02-24 PROCEDURE — 84443 ASSAY THYROID STIM HORMONE: CPT

## 2020-02-24 PROCEDURE — 83036 HEMOGLOBIN GLYCOSYLATED A1C: CPT

## 2020-02-24 PROCEDURE — 80053 COMPREHEN METABOLIC PANEL: CPT

## 2020-02-24 PROCEDURE — 99214 OFFICE O/P EST MOD 30 MIN: CPT | Performed by: NURSE PRACTITIONER

## 2020-02-24 PROCEDURE — 1160F RVW MEDS BY RX/DR IN RCRD: CPT | Performed by: NURSE PRACTITIONER

## 2020-02-24 PROCEDURE — 71250 CT THORAX DX C-: CPT

## 2020-02-24 PROCEDURE — 3078F DIAST BP <80 MM HG: CPT | Performed by: NURSE PRACTITIONER

## 2020-02-24 PROCEDURE — 4040F PNEUMOC VAC/ADMIN/RCVD: CPT | Performed by: NURSE PRACTITIONER

## 2020-02-24 PROCEDURE — 4004F PT TOBACCO SCREEN RCVD TLK: CPT | Performed by: NURSE PRACTITIONER

## 2020-02-24 PROCEDURE — 3077F SYST BP >= 140 MM HG: CPT | Performed by: NURSE PRACTITIONER

## 2020-02-24 PROCEDURE — 80061 LIPID PANEL: CPT

## 2020-02-24 PROCEDURE — 85025 COMPLETE CBC W/AUTO DIFF WBC: CPT

## 2020-02-24 PROCEDURE — 3008F BODY MASS INDEX DOCD: CPT | Performed by: NURSE PRACTITIONER

## 2020-02-24 PROCEDURE — 36415 COLL VENOUS BLD VENIPUNCTURE: CPT

## 2020-02-24 RX ORDER — PANTOPRAZOLE SODIUM 40 MG/1
TABLET, DELAYED RELEASE ORAL
Qty: 90 TABLET | Refills: 3 | Status: SHIPPED | OUTPATIENT
Start: 2020-02-24 | End: 2021-03-08

## 2020-02-24 NOTE — TELEPHONE ENCOUNTER
STAT, Emergency CT Chest w/o was ordered by Anika Ruiz today  Bogdan: 10:30AM at [facility]  No pre-cert; since Medicare is Prime

## 2020-02-24 NOTE — PROGRESS NOTES
Assessment/Plan:    Patient Instructions     20-year-old male who presents for medical clearance for a left knee replacement that is tentatively scheduled for March 10th with Dr Glenda Holden  He has significant medical history including liver transplant, restrictive lung disease, type 2 diabetes mellitus, active smoker, narcotic dependency, peripheral vascular disease  As far as the liver transplant according to the patient his transplant team has cleared him for surgery     patient with history of chronic shortness of breath and cough  Had a CT of the chest back in September that showed possible interstitial lung disease he had evaluation with pulmonology at that time was to have a follow-up CT in November and pulmonary function testing which she did not complete  At this time we will repeat CT and refer back to pulmonology for clearance he does continue to smoke about 3 cigarettes a day 100% cessation strongly encouraged in relationship to his upcoming surgery     peripheral vascular disease with diminished dorsalis pedis on left he is already scheduled for  Arterial ultrasound     chronic pain following with pain management     medical clearance will be provided once all labs and CT are reviewed again he still needs cardiac clearance and pulmonary clearance as well      Addendum: There are no medical contraindications to proceed with surgery patient has already received cardiac clearance and pulmonary clearance is pending         Diagnoses and all orders for this visit:    Liver transplanted ([facility] )    Chronic obstructive pulmonary disease, unspecified COPD type ([facility] )    Tobacco abuse  -     CT chest wo contrast; Future    Preop examination  -     CT chest wo contrast; Future    ILD (interstitial lung disease) ([facility] )  -     CT chest wo contrast; Future    Impaired fasting glucose    Arteriosclerosis of coronary artery    Mixed hyperlipidemia         Subjective:      Patient ID: Darlene Rodriguez is a 77 y o  male    Pt is here for medical clearance  Is having surgery left knee w/ dr Gera Oliveira march 10  Say cardiology and had echo and stress last cath 2015  Still smoking 3 cigs daily  Saw pulm in oct never had follow up studies as discussed  Was told he has a weak pulse in the left leg needs vascular testing  No cp or sob, does use o2 at nighttime chronic cough  On chronic narotics for chronic pain  No home bps  He states he was already cleared by transplant team        Current Outpatient Medications:     aspirin 81 MG tablet, Take 1 tablet by mouth daily, Disp: , Rfl:     atenolol (TENORMIN) 100 mg tablet, Take 1 tablet (100 mg total) by mouth 2 (two) times a day, Disp: 180 tablet, Rfl: 3    Calcium Carb-Cholecalciferol 1000-800 MG-UNIT TABS, Take 1 tablet by mouth, Disp: , Rfl:     oxyCODONE (OXYCONTIN) 40 mg 12 hr tablet, Take 40 mg by mouth every 12 (twelve) hours , Disp: , Rfl:     oxyCODONE (ROXICODONE) 10 MG TABS, Take 10 mg by mouth every 8 (eight) hours , Disp: , Rfl:     pantoprazole (PROTONIX) 40 mg tablet, TAKE 1 TABLET EVERY DAY, Disp: 90 tablet, Rfl: 3    pravastatin (PRAVACHOL) 20 mg tablet, Take 1 tablet (20 mg total) by mouth daily, Disp: 90 tablet, Rfl: 3    tacrolimus (PROGRAF) 1 mg capsule, Take 2 mg by mouth 2 (two) times a day, Disp: , Rfl:     triamcinolone (KENALOG) 0 1 % ointment, Apply topically 2 (two) times a day Applied to rash twice a day till clear, Disp: 454 g, Rfl: 1    triamcinolone (KENALOG) 0 5 % ointment, Apply topically 2 (two) times a day, Disp: 30 g, Rfl: 0    Recent Results (from the past 1008 hour(s))   Stress strip    Collection Time: 02/21/20  1:06 PM   Result Value Ref Range    Protocol Name LEXISCAN-SIT     Time In Exercise Phase 00:03:00     MAX   SYSTOLIC  mmHg    Max Diastolic Bp 82 mmHg    Max Heart Rate 64 BPM    Max Predicted Heart Rate 153 BPM    Reason for Termination Protocol Complete     Test Indication CAD  Dyspnea on effort       Target Hr Formular (220 - Age)*85%     Arrhy During Ex      ECG Interp Before Ex      ECG Interp during Ex      Ex Summary Comment      Overall Hr Response To Exercise      Overall BP Response To Exercise     CBC and differential    Collection Time: 02/24/20 10:15 AM   Result Value Ref Range    WBC 5 69 4 31 - 10 16 Thousand/uL    RBC 4 06 3 88 - 5 62 Million/uL    Hemoglobin 12 9 12 0 - 17 0 g/dL    Hematocrit 40 1 36 5 - 49 3 %    MCV 99 (H) 82 - 98 fL    MCH 31 8 26 8 - 34 3 pg    MCHC 32 2 31 4 - 37 4 g/dL    RDW 12 7 11 6 - 15 1 %    MPV 11 2 8 9 - 12 7 fL    Platelets 377 678 - 279 Thousands/uL    nRBC 0 /100 WBCs    Neutrophils Relative 56 43 - 75 %    Immat GRANS % 0 0 - 2 %    Lymphocytes Relative 34 14 - 44 %    Monocytes Relative 5 4 - 12 %    Eosinophils Relative 4 0 - 6 %    Basophils Relative 1 0 - 1 %    Neutrophils Absolute 3 18 1 85 - 7 62 Thousands/µL    Immature Grans Absolute 0 01 0 00 - 0 20 Thousand/uL    Lymphocytes Absolute 1 92 0 60 - 4 47 Thousands/µL    Monocytes Absolute 0 28 0 17 - 1 22 Thousand/µL    Eosinophils Absolute 0 24 0 00 - 0 61 Thousand/µL    Basophils Absolute 0 06 0 00 - 0 10 Thousands/µL   Comprehensive metabolic panel    Collection Time: 02/24/20 10:15 AM   Result Value Ref Range    Sodium 134 (L) 136 - 145 mmol/L    Potassium 4 5 3 5 - 5 3 mmol/L    Chloride 101 100 - 108 mmol/L    CO2 29 21 - 32 mmol/L    ANION GAP 4 4 - 13 mmol/L    BUN 14 5 - 25 mg/dL    Creatinine 1 01 0 60 - 1 30 mg/dL    Glucose, Fasting 119 (H) 65 - 99 mg/dL    Calcium 8 7 8 3 - 10 1 mg/dL    AST 17 5 - 45 U/L    ALT 18 12 - 78 U/L    Alkaline Phosphatase 85 46 - 116 U/L    Total Protein 7 4 6 4 - 8 2 g/dL    Albumin 3 8 3 5 - 5 0 g/dL    Total Bilirubin 0 31 0 20 - 1 00 mg/dL    eGFR 77 ml/min/1 73sq m   Lipid panel    Collection Time: 02/24/20 10:15 AM   Result Value Ref Range    Cholesterol 121 50 - 200 mg/dL    Triglycerides 103 <=150 mg/dL    HDL, Direct 43 >=40 mg/dL    LDL Calculated 57 0 - 100 mg/dL Non-HDL-Chol (CHOL-HDL) 78 mg/dl   Hemoglobin A1C    Collection Time: 02/24/20 10:15 AM   Result Value Ref Range    Hemoglobin A1C 5 6 Normal 3 8-5 6%; PreDiabetic 5 7-6 4%; Diabetic >=6 5%; Glycemic control for adults with diabetes <7 0% %     mg/dl   TSH, 3rd generation with Free T4 reflex    Collection Time: 02/24/20 10:15 AM   Result Value Ref Range    TSH 3RD GENERATON 1 940 0 358 - 3 740 uIU/mL       The following portions of the patient's history were reviewed and updated as appropriate: allergies, current medications, past family history, past medical history, past social history, past surgical history and problem list      Review of Systems   Constitutional: Negative for appetite change, chills, diaphoresis, fatigue, fever and unexpected weight change  HENT: Negative for postnasal drip and sneezing  Eyes: Negative for visual disturbance  Respiratory: Negative for chest tightness and shortness of breath  Cardiovascular: Negative for chest pain, palpitations and leg swelling  Gastrointestinal: Negative for abdominal pain and blood in stool  Endocrine: Negative for cold intolerance, heat intolerance, polydipsia, polyphagia and polyuria  Genitourinary: Negative for difficulty urinating, dysuria, frequency and urgency  Musculoskeletal: Negative for arthralgias and myalgias  Skin: Negative for rash and wound  Neurological: Negative for dizziness, weakness, light-headedness and headaches  Hematological: Negative for adenopathy  Psychiatric/Behavioral: Negative for confusion, dysphoric mood and sleep disturbance  The patient is not nervous/anxious  Objective:      /72 (BP Location: Left arm, Patient Position: Sitting, Cuff Size: Standard)   Pulse 78   Resp 14   Ht 6' (1 829 m)   Wt 70 9 kg (156 lb 6 4 oz)   BMI 21 21 kg/m²        Physical Exam   Constitutional: He is oriented to person, place, and time  He appears well-developed  No distress     HENT:   Head: Normocephalic and atraumatic  Nose: Nose normal    Mouth/Throat: Oropharynx is clear and moist    Eyes: Pupils are equal, round, and reactive to light  Conjunctivae and EOM are normal    Neck: Normal range of motion  Neck supple  No JVD present  No tracheal deviation present  No thyromegaly present  Cardiovascular: Normal rate, regular rhythm, normal heart sounds and intact distal pulses  Exam reveals no gallop and no friction rub  No murmur heard  Pulmonary/Chest: Effort normal and breath sounds normal  No respiratory distress  He has no wheezes  He has no rales  Abdominal: Soft  Bowel sounds are normal  He exhibits no distension  There is no tenderness  Musculoskeletal: Normal range of motion  He exhibits no edema  Lymphadenopathy:     He has no cervical adenopathy  Neurological: He is alert and oriented to person, place, and time  No cranial nerve deficit  Diminished pulse left dorsalis pedis   Skin: Skin is warm and dry  No rash noted  He is not diaphoretic  Psychiatric: He has a normal mood and affect   His behavior is normal  Judgment and thought content normal

## 2020-02-24 NOTE — PATIENT INSTRUCTIONS
38-year-old male who presents for medical clearance for a left knee replacement that is tentatively scheduled for March 10th with Dr Mae Seymour  He has significant medical history including liver transplant, restrictive lung disease, type 2 diabetes mellitus, active smoker, narcotic dependency, peripheral vascular disease  As far as the liver transplant according to the patient his transplant team has cleared him for surgery     patient with history of chronic shortness of breath and cough  Had a CT of the chest back in September that showed possible interstitial lung disease he had evaluation with pulmonology at that time was to have a follow-up CT in November and pulmonary function testing which she did not complete    At this time we will repeat CT and refer back to pulmonology for clearance he does continue to smoke about 3 cigarettes a day 100% cessation strongly encouraged in relationship to his upcoming surgery     peripheral vascular disease with diminished dorsalis pedis on left he is already scheduled for  Arterial ultrasound     chronic pain following with pain management     medical clearance will be provided once all labs and CT are reviewed again he still needs cardiac clearance and pulmonary clearance as well      Addendum: There are no medical contraindications to proceed with surgery patient has already received cardiac clearance and pulmonary clearance is pending

## 2020-02-25 ENCOUNTER — TELEPHONE (OUTPATIENT)
Dept: PULMONOLOGY | Facility: CLINIC | Age: 65
End: 2020-02-25

## 2020-02-25 NOTE — TELEPHONE ENCOUNTER
----- Message from Jesse Monae MD sent at 2/24/2020  4:57 PM EST -----  There is no active consolidation in the CT chest  and the other fibrotic changes are all the same , would need at least a spirometry , will call and see him and do the belgica in the office and then clear /risk and for surgery ,   kimmy Jama call him, and give a fu appt this week , ty

## 2020-02-25 NOTE — TELEPHONE ENCOUNTER
PT NEEDS AN APPT THIS WEEK FOR CLEARANCE  LM ON HIS VM TO CALL US BACK TO SCHEDULE   PER DR Kirill Ramos

## 2020-02-26 ENCOUNTER — TELEPHONE (OUTPATIENT)
Dept: CARDIOLOGY CLINIC | Facility: CLINIC | Age: 65
End: 2020-02-26

## 2020-02-26 NOTE — TELEPHONE ENCOUNTER
Nathaniel King, from [facility]  called and stated pt had an appointment on 2/20/20 for a cardiac risk assessment  Nathaniel King would like to know if pt is cleared for surgery  Pt will be having surgery on 3/10/20  Please fax note to [phone]        Nathaniel King contact information- [phone] ext [phone]

## 2020-02-28 ENCOUNTER — TELEPHONE (OUTPATIENT)
Dept: INTERNAL MEDICINE CLINIC | Facility: CLINIC | Age: 65
End: 2020-02-28

## 2020-02-28 ENCOUNTER — HOSPITAL ENCOUNTER (OUTPATIENT)
Dept: VASCULAR ULTRASOUND | Facility: HOSPITAL | Age: 65
Discharge: HOME | End: 2020-02-28
Payer: MEDICARE

## 2020-02-28 DIAGNOSIS — I25.10 CORONARY ARTERY DISEASE WITHOUT ANGINA PECTORIS, UNSPECIFIED VESSEL OR LESION TYPE, UNSPECIFIED WHETHER NATIVE OR TRANSPLANTED HEART: ICD-10-CM

## 2020-02-28 DIAGNOSIS — M25.562 LEFT KNEE PAIN, UNSPECIFIED CHRONICITY: ICD-10-CM

## 2020-02-28 PROCEDURE — 93925 LOWER EXTREMITY STUDY: CPT | Performed by: SURGERY

## 2020-02-28 PROCEDURE — 93925 LOWER EXTREMITY STUDY: CPT

## 2020-02-28 PROCEDURE — 93922 UPR/L XTREMITY ART 2 LEVELS: CPT | Performed by: SURGERY

## 2020-02-28 PROCEDURE — 93923 UPR/LXTR ART STDY 3+ LVLS: CPT

## 2020-02-28 NOTE — TELEPHONE ENCOUNTER
----- Message from JOSE Martínez sent at 2/27/2020  7:34 PM EST -----  His labs are stable   Im just waiting to see what pulm recommends before I can clear him for surgery

## 2020-03-03 DIAGNOSIS — I10 HYPERTENSION, UNSPECIFIED TYPE: ICD-10-CM

## 2020-03-03 RX ORDER — ATENOLOL 100 MG/1
TABLET ORAL
Qty: 180 TABLET | Refills: 3 | Status: SHIPPED | OUTPATIENT
Start: 2020-03-03 | End: 2021-03-29 | Stop reason: SDUPTHER

## 2020-03-04 ENCOUNTER — OFFICE VISIT (OUTPATIENT)
Dept: PULMONOLOGY | Facility: CLINIC | Age: 65
End: 2020-03-04
Payer: MEDICARE

## 2020-03-04 VITALS
BODY MASS INDEX: 20.72 KG/M2 | SYSTOLIC BLOOD PRESSURE: 124 MMHG | HEIGHT: 72 IN | OXYGEN SATURATION: 92 % | DIASTOLIC BLOOD PRESSURE: 74 MMHG | WEIGHT: 153 LBS | HEART RATE: 64 BPM

## 2020-03-04 DIAGNOSIS — R06.02 SHORTNESS OF BREATH: ICD-10-CM

## 2020-03-04 DIAGNOSIS — J43.2 CENTRILOBULAR EMPHYSEMA (HCC): ICD-10-CM

## 2020-03-04 DIAGNOSIS — Z72.0 TOBACCO ABUSE: ICD-10-CM

## 2020-03-04 DIAGNOSIS — Z01.811 ENCOUNTER FOR PREOPERATIVE PULMONARY EXAMINATION: Primary | ICD-10-CM

## 2020-03-04 PROCEDURE — 4004F PT TOBACCO SCREEN RCVD TLK: CPT | Performed by: PHYSICIAN ASSISTANT

## 2020-03-04 PROCEDURE — 3074F SYST BP LT 130 MM HG: CPT | Performed by: PHYSICIAN ASSISTANT

## 2020-03-04 PROCEDURE — 4040F PNEUMOC VAC/ADMIN/RCVD: CPT | Performed by: PHYSICIAN ASSISTANT

## 2020-03-04 PROCEDURE — 3044F HG A1C LEVEL LT 7.0%: CPT | Performed by: PHYSICIAN ASSISTANT

## 2020-03-04 PROCEDURE — 3078F DIAST BP <80 MM HG: CPT | Performed by: PHYSICIAN ASSISTANT

## 2020-03-04 PROCEDURE — 1160F RVW MEDS BY RX/DR IN RCRD: CPT | Performed by: PHYSICIAN ASSISTANT

## 2020-03-04 PROCEDURE — 94010 BREATHING CAPACITY TEST: CPT | Performed by: PHYSICIAN ASSISTANT

## 2020-03-04 PROCEDURE — 99214 OFFICE O/P EST MOD 30 MIN: CPT | Performed by: PHYSICIAN ASSISTANT

## 2020-03-04 RX ORDER — IPRATROPIUM BROMIDE AND ALBUTEROL SULFATE 2.5; .5 MG/3ML; MG/3ML
3 SOLUTION RESPIRATORY (INHALATION) 4 TIMES DAILY
Qty: 120 VIAL | Refills: 3 | Status: SHIPPED | OUTPATIENT
Start: 2020-03-04 | End: 2020-03-06

## 2020-03-04 NOTE — PROGRESS NOTES
Assessment:    1  Encounter for preoperative pulmonary examination  POCT spirometry   2  Centrilobular emphysema (HCC)  POCT spirometry    Complete PFT with post Bronchodilator and Six Minute walk    ipratropium-albuterol (DUO-NEB) 0 5-2 5 mg/3 mL nebulizer solution    DISCONTINUED: ipratropium-albuterol (DUO-NEB) 0 5-2 5 mg/3 mL nebulizer solution   3  Shortness of breath  Complete PFT with post Bronchodilator and Six Minute walk   4  Tobacco abuse           Plan:   Patient presents today for follow-up/pulmonary preoperative clearance for knee replacement surgery  He reports that his breathing has been stable  Spirometry done here in the office today without any significant airway obstruction or restriction  FEV1 98%  FEV1/FVC 90%  %  We do not have complete PFTs on record  Patient does require 2 L of oxygen nocturnally, has chronic dyspnea on exertion, and is actively smoking  Given the above, patient is considered to be at mild to moderate risk for pulmonary complications related to surgery  There are no pulmonary contraindications to the surgery  Discussed potential pulmonary complications with the patient including atelectasis, infection including bronchitis and pneumonia, respiratory failure/prolonged intubation, hypoxemia, exacerbation of underlying pulmonary disease  Discussed decreasing risk for pulmonary complications following the procedure including early ambulation, out of bed to chair postoperatively, and use of incentive spirometer to prevent pneumonia  Patient verbalized understanding and is agreeable to proceeding with the procedure  He was provided with a nebulizer today  Will send for DuoNeb to be used with the nebulizer up to 4 times daily as needed  Advised patient he may use the nebulizer prior to the procedure to help optimize his pulmonary status  Also recommended quitting smoking entirely      Will have the patient obtain complete PFTs in the future, this does not need to be done prior to surgery  Reviewed CT of the chest showing stable scarring  Proceed with 1 year follow-up CT given ongoing tobacco abuse  He will follow-up with his PCP regarding the non-pulmonary findings on CT  All of the patient's questions were answered to their satisfaction and understanding, which was verbalized  Patient was advised to call the office prior to next appointment should they have any questions or concerns prior  Patient made aware of worrisome symptoms that should prompt a visit to the ER or call to 911 such as chest pain, severe shortness of breath, cyanosis, throat closing, syncope, etc     Subjective:     Patient ID: Smitha Stewart is a 77 y o  male  Chief Complaint:  Jesus Olvera is a very pleasant 77year old male current smoker with past medical history including chronic bronchitis, liver transplant, arthritis, cardiac disease, diabetes  He presents today for follow-up/preoperative clearance  Patient states that his breathing has remained stable since his last visit  He chronically has dyspnea on exertion  He also has some coughing which seems related to his smoking  As soon as he cuts down on the smoking, the cough goes away completely  Has an extensive smoking history however has been trying to cut back from 1 pack per day, currently smoking 5 cigarettes or less per day and interested in quitting entirely  He uses 2 L oxygen by nasal cannula nocturnally  The following portions of the patient's history were reviewed in this encounter and updated as appropriate:  Past medical, social, surgical, family, allergies    Review of Systems   Constitutional: Positive for fatigue  Negative for appetite change  Respiratory: Positive for cough and shortness of breath  Cardiovascular: Negative for chest pain  All other systems reviewed and are negative  Objective:    Physical Exam   Constitutional: He is oriented to person, place, and time  He appears well-developed  No distress  Very thin   HENT:   Head: Normocephalic and atraumatic  Right Ear: External ear normal    Left Ear: External ear normal    Nose: Nose normal    Eyes: Conjunctivae and EOM are normal  Right eye exhibits no discharge  Left eye exhibits no discharge  No scleral icterus  Neck: Normal range of motion  Neck supple  No tracheal deviation present  Cardiovascular: Normal rate, regular rhythm and normal heart sounds  Exam reveals no gallop and no friction rub  No murmur heard  Pulmonary/Chest: Effort normal  No stridor  No respiratory distress  He has no wheezes  Decreased at the bases   Abdominal: He exhibits no distension  There is no guarding  Musculoskeletal: He exhibits no edema, tenderness or deformity  Ambulates with cane   Neurological: He is alert and oriented to person, place, and time  No cranial nerve deficit  He exhibits normal muscle tone  Skin: Skin is warm and dry  No rash noted  He is not diaphoretic  No erythema  No pallor  + Cyst noted posteriorly below L scapula   Psychiatric: He has a normal mood and affect  His behavior is normal  Judgment and thought content normal    Nursing note and vitals reviewed        Lab Review:   Appointment on 02/24/2020   Component Date Value    WBC 02/24/2020 5 69     RBC 02/24/2020 4 06     Hemoglobin 02/24/2020 12 9     Hematocrit 02/24/2020 40 1     MCV 02/24/2020 99*    MCH 02/24/2020 31 8     MCHC 02/24/2020 32 2     RDW 02/24/2020 12 7     MPV 02/24/2020 11 2     Platelets 90/90/6965 189     nRBC 02/24/2020 0     Neutrophils Relative 02/24/2020 56     Immat GRANS % 02/24/2020 0     Lymphocytes Relative 02/24/2020 34     Monocytes Relative 02/24/2020 5     Eosinophils Relative 02/24/2020 4     Basophils Relative 02/24/2020 1     Neutrophils Absolute 02/24/2020 3 18     Immature Grans Absolute 02/24/2020 0 01     Lymphocytes Absolute 02/24/2020 1 92     Monocytes Absolute 02/24/2020 0 28     Eosinophils Absolute 02/24/2020 0 24     Basophils Absolute 02/24/2020 0 06     Sodium 02/24/2020 134*    Potassium 02/24/2020 4 5     Chloride 02/24/2020 101     CO2 02/24/2020 29     ANION GAP 02/24/2020 4     BUN 02/24/2020 14     Creatinine 02/24/2020 1 01     Glucose, Fasting 02/24/2020 119*    Calcium 02/24/2020 8 7     AST 02/24/2020 17     ALT 02/24/2020 18     Alkaline Phosphatase 02/24/2020 85     Total Protein 02/24/2020 7 4     Albumin 02/24/2020 3 8     Total Bilirubin 02/24/2020 0 31     eGFR 02/24/2020 77     Cholesterol 02/24/2020 121     Triglycerides 02/24/2020 103     HDL, Direct 02/24/2020 43     LDL Calculated 02/24/2020 57     Non-HDL-Chol (CHOL-HDL) 02/24/2020 78     Hemoglobin A1C 02/24/2020 5 6     EAG 02/24/2020 114     TSH 3RD GENERATON 02/24/2020 1 940    Hospital Outpatient Visit on 02/21/2020   Component Date Value    Protocol Name 02/21/2020 LEXISCAN-SIT     Time In Exercise Phase 02/21/2020 00:03:00     MAX   SYSTOLIC BP 57/64/1647 203     Max Diastolic Bp 47/76/7705 82     Max Heart Rate 02/21/2020 64     Max Predicted Heart Rate 02/21/2020 153     Reason for Termination 02/21/2020 Protocol Complete     Test Indication 02/21/2020                      Value:CAD  Dyspnea on effort      Target Hr Formular 02/21/2020 (220 - Age)*85%

## 2020-03-06 ENCOUNTER — TELEPHONE (OUTPATIENT)
Dept: PULMONOLOGY | Facility: CLINIC | Age: 65
End: 2020-03-06

## 2020-03-06 ENCOUNTER — TELEPHONE (OUTPATIENT)
Dept: INTERNAL MEDICINE CLINIC | Facility: CLINIC | Age: 65
End: 2020-03-06

## 2020-03-06 RX ORDER — IPRATROPIUM BROMIDE AND ALBUTEROL SULFATE 2.5; .5 MG/3ML; MG/3ML
3 SOLUTION RESPIRATORY (INHALATION) 4 TIMES DAILY
Qty: 1080 ML | Refills: 3 | Status: SHIPPED | OUTPATIENT
Start: 2020-03-06 | End: 2021-04-19 | Stop reason: CLARIF

## 2020-03-06 NOTE — TELEPHONE ENCOUNTER
----- Message from Jeremy Lockett, [address]  sent at 3/6/2020  1:00 PM EST -----  March 10th we will call pt and tell him he needs to cancel  [facility]- please let pt know that he will not be able to get clearance from pul until his visit 3/31- he will have to cancel surgery that is scheduled next week

## 2020-03-09 ENCOUNTER — TELEPHONE (OUTPATIENT)
Dept: PULMONOLOGY | Facility: CLINIC | Age: 65
End: 2020-03-09

## 2020-03-09 NOTE — TELEPHONE ENCOUNTER
----- Message from Ingris Marquez PA-C sent at 3/6/2020  4:03 PM EST -----  Can you please fax preop clearance to fax # (found on appointment note in the schedule)?  ty

## 2020-03-18 NOTE — TELEPHONE ENCOUNTER
Spoke with: patient  Re:  Cancel Surgery  Provider's message/resuts/instructions/inquiries relayed in full detal   Comments:    Pt stated that he did have the LTKR surgery on 3/10/2020 and is doing well

## 2020-04-01 ENCOUNTER — TELEPHONE (OUTPATIENT)
Dept: INTERNAL MEDICINE CLINIC | Facility: CLINIC | Age: 65
End: 2020-04-01

## 2020-04-07 ENCOUNTER — TELEMEDICINE (OUTPATIENT)
Dept: INTERNAL MEDICINE CLINIC | Facility: CLINIC | Age: 65
End: 2020-04-07
Payer: MEDICARE

## 2020-04-07 DIAGNOSIS — L02.91 SOFT TISSUE ABSCESS: Primary | ICD-10-CM

## 2020-04-07 PROCEDURE — 99213 OFFICE O/P EST LOW 20 MIN: CPT | Performed by: INTERNAL MEDICINE

## 2020-04-07 RX ORDER — ERGOCALCIFEROL 1.25 MG/1
CAPSULE ORAL
COMMUNITY
Start: 2020-02-20 | End: 2020-04-07

## 2020-04-08 ENCOUNTER — TELEMEDICINE (OUTPATIENT)
Dept: SURGERY | Facility: CLINIC | Age: 65
End: 2020-04-08
Payer: MEDICARE

## 2020-04-08 DIAGNOSIS — L08.9 INFECTED SEBACEOUS CYST: Primary | ICD-10-CM

## 2020-04-08 DIAGNOSIS — D84.9 IMMUNOSUPPRESSION (HCC): ICD-10-CM

## 2020-04-08 DIAGNOSIS — Z94.4 LIVER TRANSPLANTED (HCC): ICD-10-CM

## 2020-04-08 DIAGNOSIS — L72.3 INFECTED SEBACEOUS CYST: Primary | ICD-10-CM

## 2020-04-08 PROCEDURE — 99214 OFFICE O/P EST MOD 30 MIN: CPT | Performed by: SURGERY

## 2020-04-09 ENCOUNTER — ANESTHESIA EVENT (OUTPATIENT)
Dept: PERIOP | Facility: HOSPITAL | Age: 65
End: 2020-04-09
Payer: MEDICARE

## 2020-04-10 ENCOUNTER — ANESTHESIA (OUTPATIENT)
Dept: PERIOP | Facility: HOSPITAL | Age: 65
End: 2020-04-10
Payer: MEDICARE

## 2020-04-10 ENCOUNTER — HOSPITAL ENCOUNTER (OUTPATIENT)
Facility: HOSPITAL | Age: 65
Setting detail: OUTPATIENT SURGERY
Discharge: HOME | End: 2020-04-10
Attending: SURGERY | Admitting: SURGERY
Payer: MEDICARE

## 2020-04-10 VITALS
TEMPERATURE: 98.5 F | RESPIRATION RATE: 20 BRPM | SYSTOLIC BLOOD PRESSURE: 136 MMHG | WEIGHT: 148 LBS | HEIGHT: 72 IN | DIASTOLIC BLOOD PRESSURE: 61 MMHG | BODY MASS INDEX: 20.05 KG/M2 | OXYGEN SATURATION: 99 % | HEART RATE: 60 BPM

## 2020-04-10 DIAGNOSIS — L72.3 INFECTED SEBACEOUS CYST: ICD-10-CM

## 2020-04-10 DIAGNOSIS — L08.9 INFECTED SEBACEOUS CYST: ICD-10-CM

## 2020-04-10 LAB
GLUCOSE SERPL-MCNC: 121 MG/DL (ref 65–140)
GLUCOSE SERPL-MCNC: 141 MG/DL (ref 65–140)

## 2020-04-10 PROCEDURE — 11042 DBRDMT SUBQ TIS 1ST 20SQCM/<: CPT | Performed by: SURGERY

## 2020-04-10 PROCEDURE — 11045 DBRDMT SUBQ TISS EACH ADDL: CPT | Performed by: SURGERY

## 2020-04-10 PROCEDURE — 87176 TISSUE HOMOGENIZATION CULTR: CPT | Performed by: SURGERY

## 2020-04-10 PROCEDURE — 88304 TISSUE EXAM BY PATHOLOGIST: CPT | Performed by: PATHOLOGY

## 2020-04-10 PROCEDURE — 87070 CULTURE OTHR SPECIMN AEROBIC: CPT | Performed by: SURGERY

## 2020-04-10 PROCEDURE — 87205 SMEAR GRAM STAIN: CPT | Performed by: SURGERY

## 2020-04-10 PROCEDURE — 82948 REAGENT STRIP/BLOOD GLUCOSE: CPT

## 2020-04-10 PROCEDURE — 87075 CULTR BACTERIA EXCEPT BLOOD: CPT | Performed by: SURGERY

## 2020-04-10 RX ORDER — OXYCODONE HYDROCHLORIDE 5 MG/1
5 TABLET ORAL EVERY 6 HOURS PRN
Qty: 20 TABLET | Refills: 0 | Status: SHIPPED | OUTPATIENT
Start: 2020-04-10 | End: 2020-04-15

## 2020-04-10 RX ORDER — FENTANYL CITRATE/PF 50 MCG/ML
25 SYRINGE (ML) INJECTION
Status: DISCONTINUED | OUTPATIENT
Start: 2020-04-10 | End: 2020-04-10 | Stop reason: HOSPADM

## 2020-04-10 RX ORDER — MIDAZOLAM HYDROCHLORIDE 2 MG/2ML
INJECTION, SOLUTION INTRAMUSCULAR; INTRAVENOUS AS NEEDED
Status: DISCONTINUED | OUTPATIENT
Start: 2020-04-10 | End: 2020-04-10 | Stop reason: SURG

## 2020-04-10 RX ORDER — ONDANSETRON 2 MG/ML
INJECTION INTRAMUSCULAR; INTRAVENOUS AS NEEDED
Status: DISCONTINUED | OUTPATIENT
Start: 2020-04-10 | End: 2020-04-10 | Stop reason: SURG

## 2020-04-10 RX ORDER — CLINDAMYCIN PHOSPHATE 900 MG/50ML
900 INJECTION INTRAVENOUS ONCE
Status: COMPLETED | OUTPATIENT
Start: 2020-04-10 | End: 2020-04-10

## 2020-04-10 RX ORDER — PROPOFOL 10 MG/ML
INJECTION, EMULSION INTRAVENOUS CONTINUOUS PRN
Status: DISCONTINUED | OUTPATIENT
Start: 2020-04-10 | End: 2020-04-10 | Stop reason: SURG

## 2020-04-10 RX ORDER — PROPOFOL 10 MG/ML
INJECTION, EMULSION INTRAVENOUS AS NEEDED
Status: DISCONTINUED | OUTPATIENT
Start: 2020-04-10 | End: 2020-04-10 | Stop reason: SURG

## 2020-04-10 RX ORDER — SODIUM CHLORIDE, SODIUM LACTATE, POTASSIUM CHLORIDE, CALCIUM CHLORIDE 600; 310; 30; 20 MG/100ML; MG/100ML; MG/100ML; MG/100ML
125 INJECTION, SOLUTION INTRAVENOUS CONTINUOUS
Status: ACTIVE | OUTPATIENT
Start: 2020-04-10 | End: 2020-04-10

## 2020-04-10 RX ORDER — ONDANSETRON 2 MG/ML
4 INJECTION INTRAMUSCULAR; INTRAVENOUS ONCE AS NEEDED
Status: DISCONTINUED | OUTPATIENT
Start: 2020-04-10 | End: 2020-04-10 | Stop reason: HOSPADM

## 2020-04-10 RX ORDER — MAGNESIUM HYDROXIDE 1200 MG/15ML
LIQUID ORAL AS NEEDED
Status: DISCONTINUED | OUTPATIENT
Start: 2020-04-10 | End: 2020-04-10 | Stop reason: HOSPADM

## 2020-04-10 RX ORDER — KETAMINE HYDROCHLORIDE 50 MG/ML
INJECTION, SOLUTION, CONCENTRATE INTRAMUSCULAR; INTRAVENOUS AS NEEDED
Status: DISCONTINUED | OUTPATIENT
Start: 2020-04-10 | End: 2020-04-10 | Stop reason: SURG

## 2020-04-10 RX ORDER — FENTANYL CITRATE 50 UG/ML
INJECTION, SOLUTION INTRAMUSCULAR; INTRAVENOUS AS NEEDED
Status: DISCONTINUED | OUTPATIENT
Start: 2020-04-10 | End: 2020-04-10 | Stop reason: SURG

## 2020-04-10 RX ORDER — SODIUM CHLORIDE, SODIUM LACTATE, POTASSIUM CHLORIDE, CALCIUM CHLORIDE 600; 310; 30; 20 MG/100ML; MG/100ML; MG/100ML; MG/100ML
125 INJECTION, SOLUTION INTRAVENOUS CONTINUOUS
Status: DISCONTINUED | OUTPATIENT
Start: 2020-04-10 | End: 2020-04-10

## 2020-04-10 RX ADMIN — FENTANYL CITRATE 50 MCG: 50 INJECTION, SOLUTION INTRAMUSCULAR; INTRAVENOUS at 12:00

## 2020-04-10 RX ADMIN — FENTANYL CITRATE 50 MCG: 50 INJECTION, SOLUTION INTRAMUSCULAR; INTRAVENOUS at 12:08

## 2020-04-10 RX ADMIN — PHENYLEPHRINE HYDROCHLORIDE 100 MCG: 10 INJECTION INTRAVENOUS at 12:18

## 2020-04-10 RX ADMIN — SODIUM CHLORIDE, SODIUM LACTATE, POTASSIUM CHLORIDE, AND CALCIUM CHLORIDE: .6; .31; .03; .02 INJECTION, SOLUTION INTRAVENOUS at 11:14

## 2020-04-10 RX ADMIN — KETAMINE HYDROCHLORIDE 20 MG: 50 INJECTION INTRAMUSCULAR; INTRAVENOUS at 12:16

## 2020-04-10 RX ADMIN — PROPOFOL 100 MCG/KG/MIN: 10 INJECTION, EMULSION INTRAVENOUS at 12:05

## 2020-04-10 RX ADMIN — PROPOFOL 30 MG: 10 INJECTION, EMULSION INTRAVENOUS at 12:05

## 2020-04-10 RX ADMIN — KETAMINE HYDROCHLORIDE 20 MG: 50 INJECTION INTRAMUSCULAR; INTRAVENOUS at 12:11

## 2020-04-10 RX ADMIN — CLINDAMYCIN PHOSPHATE 900 MG: 18 INJECTION, SOLUTION INTRAMUSCULAR; INTRAVENOUS at 12:05

## 2020-04-10 RX ADMIN — KETAMINE HYDROCHLORIDE 10 MG: 50 INJECTION INTRAMUSCULAR; INTRAVENOUS at 12:20

## 2020-04-10 RX ADMIN — MIDAZOLAM HYDROCHLORIDE 2 MG: 1 INJECTION, SOLUTION INTRAMUSCULAR; INTRAVENOUS at 11:57

## 2020-04-10 RX ADMIN — PHENYLEPHRINE HYDROCHLORIDE 50 MCG: 10 INJECTION INTRAVENOUS at 12:40

## 2020-04-10 RX ADMIN — PHENYLEPHRINE HYDROCHLORIDE 100 MCG: 10 INJECTION INTRAVENOUS at 12:34

## 2020-04-10 RX ADMIN — ONDANSETRON 4 MG: 2 INJECTION INTRAMUSCULAR; INTRAVENOUS at 12:21

## 2020-04-13 LAB
BACTERIA SPEC ANAEROBE CULT: NO GROWTH
BACTERIA TISS AEROBE CULT: NO GROWTH
GRAM STN SPEC: NORMAL

## 2020-04-27 ENCOUNTER — TELEMEDICINE (OUTPATIENT)
Dept: CARDIOLOGY CLINIC | Facility: CLINIC | Age: 65
End: 2020-04-27
Payer: MEDICARE

## 2020-04-27 DIAGNOSIS — Z72.0 TOBACCO ABUSE: ICD-10-CM

## 2020-04-27 DIAGNOSIS — J43.8 OTHER EMPHYSEMA (HCC): ICD-10-CM

## 2020-04-27 DIAGNOSIS — Z94.4 LIVER TRANSPLANTED (HCC): ICD-10-CM

## 2020-04-27 DIAGNOSIS — R06.00 DYSPNEA ON EXERTION: Primary | ICD-10-CM

## 2020-04-27 DIAGNOSIS — E78.2 MIXED HYPERLIPIDEMIA: ICD-10-CM

## 2020-04-27 DIAGNOSIS — I25.10 ATHEROSCLEROSIS OF NATIVE CORONARY ARTERY OF NATIVE HEART WITHOUT ANGINA PECTORIS: ICD-10-CM

## 2020-04-27 PROCEDURE — 99214 OFFICE O/P EST MOD 30 MIN: CPT | Performed by: INTERNAL MEDICINE

## 2020-04-27 RX ORDER — POTASSIUM CHLORIDE 20 MEQ/1
20 TABLET, EXTENDED RELEASE ORAL EVERY OTHER DAY
Qty: 90 TABLET | Refills: 3 | Status: SHIPPED | OUTPATIENT
Start: 2020-04-27 | End: 2021-06-28

## 2020-04-27 RX ORDER — FUROSEMIDE 40 MG/1
40 TABLET ORAL DAILY
Qty: 90 TABLET | Refills: 3 | Status: SHIPPED | OUTPATIENT
Start: 2020-04-27 | End: 2021-02-09

## 2020-06-02 ENCOUNTER — APPOINTMENT (OUTPATIENT)
Dept: LAB | Facility: CLINIC | Age: 65
End: 2020-06-02
Payer: MEDICARE

## 2020-06-02 ENCOUNTER — TRANSCRIBE ORDERS (OUTPATIENT)
Dept: LAB | Facility: CLINIC | Age: 65
End: 2020-06-02

## 2020-06-02 DIAGNOSIS — I25.10 ATHEROSCLEROSIS OF NATIVE CORONARY ARTERY OF NATIVE HEART WITHOUT ANGINA PECTORIS: ICD-10-CM

## 2020-06-02 DIAGNOSIS — D68.9 BLOOD CLOTTING DISORDER (HCC): ICD-10-CM

## 2020-06-02 DIAGNOSIS — Z94.4 LIVER REPLACED BY TRANSPLANT (HCC): ICD-10-CM

## 2020-06-02 DIAGNOSIS — R06.00 DYSPNEA ON EXERTION: ICD-10-CM

## 2020-06-02 DIAGNOSIS — Z94.4 LIVER REPLACED BY TRANSPLANT (HCC): Primary | ICD-10-CM

## 2020-06-02 DIAGNOSIS — C22.1 MALIGNANT NEOPLASM OF INTRAHEPATIC BILE DUCTS (HCC): ICD-10-CM

## 2020-06-02 DIAGNOSIS — R79.1 ABNORMAL COAGULATION PROFILE: ICD-10-CM

## 2020-06-02 LAB
ALBUMIN SERPL BCP-MCNC: 4.1 G/DL (ref 3.5–5)
ALP SERPL-CCNC: 85 U/L (ref 46–116)
ALT SERPL W P-5'-P-CCNC: 15 U/L (ref 12–78)
ANION GAP SERPL CALCULATED.3IONS-SCNC: 5 MMOL/L (ref 4–13)
AST SERPL W P-5'-P-CCNC: 14 U/L (ref 5–45)
BASOPHILS # BLD AUTO: 0.09 THOUSANDS/ΜL (ref 0–0.1)
BASOPHILS NFR BLD AUTO: 2 % (ref 0–1)
BILIRUB SERPL-MCNC: 0.33 MG/DL (ref 0.2–1)
BUN SERPL-MCNC: 13 MG/DL (ref 5–25)
CALCIUM SERPL-MCNC: 9.5 MG/DL (ref 8.3–10.1)
CHLORIDE SERPL-SCNC: 98 MMOL/L (ref 100–108)
CO2 SERPL-SCNC: 27 MMOL/L (ref 21–32)
CREAT SERPL-MCNC: 1.24 MG/DL (ref 0.6–1.3)
EOSINOPHIL # BLD AUTO: 0.19 THOUSAND/ΜL (ref 0–0.61)
EOSINOPHIL NFR BLD AUTO: 3 % (ref 0–6)
ERYTHROCYTE [DISTWIDTH] IN BLOOD BY AUTOMATED COUNT: 13.1 % (ref 11.6–15.1)
GFR SERPL CREATININE-BSD FRML MDRD: 60 ML/MIN/1.73SQ M
GGT SERPL-CCNC: 27 U/L (ref 5–85)
GLUCOSE P FAST SERPL-MCNC: 135 MG/DL (ref 65–99)
HCT VFR BLD AUTO: 40.9 % (ref 36.5–49.3)
HGB BLD-MCNC: 13.3 G/DL (ref 12–17)
IMM GRANULOCYTES # BLD AUTO: 0.02 THOUSAND/UL (ref 0–0.2)
IMM GRANULOCYTES NFR BLD AUTO: 0 % (ref 0–2)
LYMPHOCYTES # BLD AUTO: 2.15 THOUSANDS/ΜL (ref 0.6–4.47)
LYMPHOCYTES NFR BLD AUTO: 38 % (ref 14–44)
MAGNESIUM SERPL-MCNC: 1.8 MG/DL (ref 1.6–2.6)
MCH RBC QN AUTO: 31 PG (ref 26.8–34.3)
MCHC RBC AUTO-ENTMCNC: 32.5 G/DL (ref 31.4–37.4)
MCV RBC AUTO: 95 FL (ref 82–98)
MONOCYTES # BLD AUTO: 0.37 THOUSAND/ΜL (ref 0.17–1.22)
MONOCYTES NFR BLD AUTO: 6 % (ref 4–12)
NEUTROPHILS # BLD AUTO: 2.92 THOUSANDS/ΜL (ref 1.85–7.62)
NEUTS SEG NFR BLD AUTO: 51 % (ref 43–75)
NRBC BLD AUTO-RTO: 0 /100 WBCS
PLATELET # BLD AUTO: 193 THOUSANDS/UL (ref 149–390)
PMV BLD AUTO: 10.9 FL (ref 8.9–12.7)
POTASSIUM SERPL-SCNC: 4.1 MMOL/L (ref 3.5–5.3)
PROT SERPL-MCNC: 8.2 G/DL (ref 6.4–8.2)
RBC # BLD AUTO: 4.29 MILLION/UL (ref 3.88–5.62)
SODIUM SERPL-SCNC: 130 MMOL/L (ref 136–145)
TACROLIMUS BLD-MCNC: 4.3 NG/ML (ref 2–20)
WBC # BLD AUTO: 5.74 THOUSAND/UL (ref 4.31–10.16)

## 2020-06-02 PROCEDURE — 82977 ASSAY OF GGT: CPT

## 2020-06-02 PROCEDURE — 85025 COMPLETE CBC W/AUTO DIFF WBC: CPT

## 2020-06-02 PROCEDURE — 83735 ASSAY OF MAGNESIUM: CPT

## 2020-06-02 PROCEDURE — 80197 ASSAY OF TACROLIMUS: CPT

## 2020-06-02 PROCEDURE — 36415 COLL VENOUS BLD VENIPUNCTURE: CPT

## 2020-06-02 PROCEDURE — 80053 COMPREHEN METABOLIC PANEL: CPT

## 2020-06-16 ENCOUNTER — TELEPHONE (OUTPATIENT)
Dept: INTERNAL MEDICINE CLINIC | Facility: CLINIC | Age: 65
End: 2020-06-16

## 2020-06-16 DIAGNOSIS — E78.5 HYPERLIPIDEMIA, UNSPECIFIED HYPERLIPIDEMIA TYPE: ICD-10-CM

## 2020-06-16 DIAGNOSIS — I10 HYPERTENSION, UNSPECIFIED TYPE: Primary | ICD-10-CM

## 2020-06-16 DIAGNOSIS — R73.01 IMPAIRED FASTING GLUCOSE: ICD-10-CM

## 2020-06-16 DIAGNOSIS — E78.2 MIXED HYPERLIPIDEMIA: ICD-10-CM

## 2020-06-16 PROBLEM — B18.2 HEPATITIS C VIRUS CARRIER STATE (HCC): Status: ACTIVE | Noted: 2020-06-16

## 2020-06-16 RX ORDER — PRAVASTATIN SODIUM 20 MG
TABLET ORAL
Qty: 90 TABLET | Refills: 3 | Status: SHIPPED | OUTPATIENT
Start: 2020-06-16 | End: 2020-06-18 | Stop reason: SDUPTHER

## 2020-06-18 DIAGNOSIS — E78.5 HYPERLIPIDEMIA, UNSPECIFIED HYPERLIPIDEMIA TYPE: ICD-10-CM

## 2020-06-18 RX ORDER — PRAVASTATIN SODIUM 20 MG
20 TABLET ORAL DAILY
Qty: 90 TABLET | Refills: 3 | Status: SHIPPED | OUTPATIENT
Start: 2020-06-18 | End: 2021-01-11

## 2020-07-07 ENCOUNTER — APPOINTMENT (OUTPATIENT)
Dept: LAB | Facility: CLINIC | Age: 65
End: 2020-07-07
Payer: MEDICARE

## 2020-07-07 ENCOUNTER — TRANSCRIBE ORDERS (OUTPATIENT)
Dept: LAB | Facility: CLINIC | Age: 65
End: 2020-07-07

## 2020-07-07 ENCOUNTER — TELEPHONE (OUTPATIENT)
Dept: NEUROLOGY | Facility: CLINIC | Age: 65
End: 2020-07-07

## 2020-07-07 DIAGNOSIS — L72.3 INFECTED SEBACEOUS CYST: ICD-10-CM

## 2020-07-07 DIAGNOSIS — Z94.4 LIVER REPLACED BY TRANSPLANT (HCC): Primary | ICD-10-CM

## 2020-07-07 DIAGNOSIS — R79.1 ABNORMAL COAGULATION PROFILE: ICD-10-CM

## 2020-07-07 DIAGNOSIS — Z94.4 LIVER TRANSPLANTED (HCC): ICD-10-CM

## 2020-07-07 DIAGNOSIS — E78.5 HYPERLIPIDEMIA, UNSPECIFIED HYPERLIPIDEMIA TYPE: ICD-10-CM

## 2020-07-07 DIAGNOSIS — D68.9 BLOOD CLOTTING DISORDER (HCC): ICD-10-CM

## 2020-07-07 DIAGNOSIS — I10 HYPERTENSION, UNSPECIFIED TYPE: ICD-10-CM

## 2020-07-07 DIAGNOSIS — D84.9 IMMUNOSUPPRESSION (HCC): ICD-10-CM

## 2020-07-07 DIAGNOSIS — M46.1 SACROILIITIS, NOT ELSEWHERE CLASSIFIED (HCC): ICD-10-CM

## 2020-07-07 DIAGNOSIS — C22.1 MALIGNANT NEOPLASM OF INTRAHEPATIC BILE DUCTS (HCC): ICD-10-CM

## 2020-07-07 DIAGNOSIS — E78.2 MIXED HYPERLIPIDEMIA: ICD-10-CM

## 2020-07-07 DIAGNOSIS — L08.9 INFECTED SEBACEOUS CYST: ICD-10-CM

## 2020-07-07 DIAGNOSIS — M46.1 SACROILIITIS, NOT ELSEWHERE CLASSIFIED (HCC): Primary | ICD-10-CM

## 2020-07-07 DIAGNOSIS — Z94.4 LIVER REPLACED BY TRANSPLANT (HCC): ICD-10-CM

## 2020-07-07 DIAGNOSIS — R73.01 IMPAIRED FASTING GLUCOSE: ICD-10-CM

## 2020-07-07 LAB
ALBUMIN SERPL BCP-MCNC: 4.6 G/DL (ref 3.5–5)
ALP SERPL-CCNC: 64 U/L (ref 46–116)
ALT SERPL W P-5'-P-CCNC: 16 U/L (ref 12–78)
ANION GAP SERPL CALCULATED.3IONS-SCNC: 6 MMOL/L (ref 4–13)
AST SERPL W P-5'-P-CCNC: 19 U/L (ref 5–45)
BASOPHILS # BLD AUTO: 0.06 THOUSANDS/ΜL (ref 0–0.1)
BASOPHILS NFR BLD AUTO: 1 % (ref 0–1)
BILIRUB SERPL-MCNC: 0.58 MG/DL (ref 0.2–1)
BUN SERPL-MCNC: 14 MG/DL (ref 5–25)
CALCIUM SERPL-MCNC: 9.4 MG/DL (ref 8.3–10.1)
CHLORIDE SERPL-SCNC: 94 MMOL/L (ref 100–108)
CHOLEST SERPL-MCNC: 161 MG/DL (ref 50–200)
CO2 SERPL-SCNC: 30 MMOL/L (ref 21–32)
CREAT SERPL-MCNC: 0.97 MG/DL (ref 0.6–1.3)
CRP SERPL QL: <3 MG/L
EOSINOPHIL # BLD AUTO: 0.1 THOUSAND/ΜL (ref 0–0.61)
EOSINOPHIL NFR BLD AUTO: 2 % (ref 0–6)
ERYTHROCYTE [DISTWIDTH] IN BLOOD BY AUTOMATED COUNT: 14.4 % (ref 11.6–15.1)
ERYTHROCYTE [SEDIMENTATION RATE] IN BLOOD: 16 MM/HOUR (ref 0–10)
EST. AVERAGE GLUCOSE BLD GHB EST-MCNC: 134 MG/DL
GFR SERPL CREATININE-BSD FRML MDRD: 80 ML/MIN/1.73SQ M
GGT SERPL-CCNC: 28 U/L (ref 5–85)
GLUCOSE P FAST SERPL-MCNC: 105 MG/DL (ref 65–99)
HBA1C MFR BLD: 6.3 %
HCT VFR BLD AUTO: 40.2 % (ref 36.5–49.3)
HDLC SERPL-MCNC: 65 MG/DL
HGB BLD-MCNC: 13.3 G/DL (ref 12–17)
IMM GRANULOCYTES # BLD AUTO: 0.04 THOUSAND/UL (ref 0–0.2)
IMM GRANULOCYTES NFR BLD AUTO: 1 % (ref 0–2)
INR PPP: 1.04 (ref 0.84–1.19)
LDLC SERPL CALC-MCNC: 80 MG/DL (ref 0–100)
LYMPHOCYTES # BLD AUTO: 2.14 THOUSANDS/ΜL (ref 0.6–4.47)
LYMPHOCYTES NFR BLD AUTO: 34 % (ref 14–44)
MAGNESIUM SERPL-MCNC: 1.9 MG/DL (ref 1.6–2.6)
MCH RBC QN AUTO: 31.5 PG (ref 26.8–34.3)
MCHC RBC AUTO-ENTMCNC: 33.1 G/DL (ref 31.4–37.4)
MCV RBC AUTO: 95 FL (ref 82–98)
MONOCYTES # BLD AUTO: 0.39 THOUSAND/ΜL (ref 0.17–1.22)
MONOCYTES NFR BLD AUTO: 6 % (ref 4–12)
NEUTROPHILS # BLD AUTO: 3.66 THOUSANDS/ΜL (ref 1.85–7.62)
NEUTS SEG NFR BLD AUTO: 56 % (ref 43–75)
NONHDLC SERPL-MCNC: 96 MG/DL
NRBC BLD AUTO-RTO: 0 /100 WBCS
PLATELET # BLD AUTO: 184 THOUSANDS/UL (ref 149–390)
PMV BLD AUTO: 10.2 FL (ref 8.9–12.7)
POTASSIUM SERPL-SCNC: 4.1 MMOL/L (ref 3.5–5.3)
PROT SERPL-MCNC: 8.4 G/DL (ref 6.4–8.2)
PROTHROMBIN TIME: 13.6 SECONDS (ref 11.6–14.5)
RBC # BLD AUTO: 4.22 MILLION/UL (ref 3.88–5.62)
SODIUM SERPL-SCNC: 130 MMOL/L (ref 136–145)
TRIGL SERPL-MCNC: 80 MG/DL
TSH SERPL DL<=0.05 MIU/L-ACNC: 1.45 UIU/ML (ref 0.36–3.74)
URATE SERPL-MCNC: 7.7 MG/DL (ref 4.2–8)
WBC # BLD AUTO: 6.39 THOUSAND/UL (ref 4.31–10.16)

## 2020-07-07 PROCEDURE — 80197 ASSAY OF TACROLIMUS: CPT

## 2020-07-07 PROCEDURE — 36415 COLL VENOUS BLD VENIPUNCTURE: CPT | Performed by: PHYSICIAN ASSISTANT

## 2020-07-07 PROCEDURE — 86038 ANTINUCLEAR ANTIBODIES: CPT

## 2020-07-07 PROCEDURE — 86431 RHEUMATOID FACTOR QUANT: CPT

## 2020-07-07 PROCEDURE — 86618 LYME DISEASE ANTIBODY: CPT

## 2020-07-07 PROCEDURE — 86140 C-REACTIVE PROTEIN: CPT

## 2020-07-07 PROCEDURE — 82977 ASSAY OF GGT: CPT

## 2020-07-07 PROCEDURE — 86430 RHEUMATOID FACTOR TEST QUAL: CPT

## 2020-07-07 PROCEDURE — 80061 LIPID PANEL: CPT

## 2020-07-07 PROCEDURE — 85025 COMPLETE CBC W/AUTO DIFF WBC: CPT

## 2020-07-07 PROCEDURE — 85652 RBC SED RATE AUTOMATED: CPT | Performed by: PHYSICIAN ASSISTANT

## 2020-07-07 PROCEDURE — 84550 ASSAY OF BLOOD/URIC ACID: CPT

## 2020-07-07 PROCEDURE — 84443 ASSAY THYROID STIM HORMONE: CPT

## 2020-07-07 PROCEDURE — 83036 HEMOGLOBIN GLYCOSYLATED A1C: CPT

## 2020-07-07 PROCEDURE — 83735 ASSAY OF MAGNESIUM: CPT

## 2020-07-07 PROCEDURE — 80053 COMPREHEN METABOLIC PANEL: CPT

## 2020-07-07 PROCEDURE — 86617 LYME DISEASE ANTIBODY: CPT

## 2020-07-07 PROCEDURE — 85610 PROTHROMBIN TIME: CPT

## 2020-07-08 LAB
CRYOGLOB RF SER-ACNC: ABNORMAL [IU]/ML
RHEUMATOID FACT SER QL LA: POSITIVE
RYE IGE QN: NEGATIVE
TACROLIMUS BLD-MCNC: 7.9 NG/ML (ref 2–20)

## 2020-07-09 LAB
B BURGDOR IGG PATRN SER IB-IMP: POSITIVE
B BURGDOR IGG+IGM SER-ACNC: 2.83 ISR (ref 0–0.9)
B BURGDOR IGM PATRN SER IB-IMP: NEGATIVE
B BURGDOR18KD IGG SER QL IB: PRESENT
B BURGDOR23KD IGG SER QL IB: PRESENT
B BURGDOR23KD IGM SER QL IB: ABNORMAL
B BURGDOR28KD IGG SER QL IB: PRESENT
B BURGDOR30KD IGG SER QL IB: PRESENT
B BURGDOR39KD IGG SER QL IB: PRESENT
B BURGDOR39KD IGM SER QL IB: ABNORMAL
B BURGDOR41KD IGG SER QL IB: PRESENT
B BURGDOR41KD IGM SER QL IB: PRESENT
B BURGDOR45KD IGG SER QL IB: ABNORMAL
B BURGDOR58KD IGG SER QL IB: PRESENT
B BURGDOR66KD IGG SER QL IB: PRESENT
B BURGDOR93KD IGG SER QL IB: PRESENT

## 2020-07-10 ENCOUNTER — TRANSCRIBE ORDERS (OUTPATIENT)
Dept: NEUROLOGY | Facility: CLINIC | Age: 65
End: 2020-07-10

## 2020-07-10 DIAGNOSIS — M54.50 LOW BACK PAIN: Primary | ICD-10-CM

## 2020-07-21 ENCOUNTER — OFFICE VISIT (OUTPATIENT)
Dept: INTERNAL MEDICINE CLINIC | Facility: CLINIC | Age: 65
End: 2020-07-21
Payer: MEDICARE

## 2020-07-21 ENCOUNTER — TELEPHONE (OUTPATIENT)
Dept: INTERNAL MEDICINE CLINIC | Facility: CLINIC | Age: 65
End: 2020-07-21

## 2020-07-21 VITALS
SYSTOLIC BLOOD PRESSURE: 148 MMHG | OXYGEN SATURATION: 98 % | BODY MASS INDEX: 20.32 KG/M2 | DIASTOLIC BLOOD PRESSURE: 88 MMHG | TEMPERATURE: 97.5 F | HEIGHT: 72 IN | WEIGHT: 150 LBS | HEART RATE: 50 BPM

## 2020-07-21 DIAGNOSIS — D84.9 IMMUNOSUPPRESSION (HCC): ICD-10-CM

## 2020-07-21 DIAGNOSIS — J43.8 OTHER EMPHYSEMA (HCC): ICD-10-CM

## 2020-07-21 DIAGNOSIS — C22.1 MALIGNANT NEOPLASM OF INTRAHEPATIC BILE DUCTS (HCC): ICD-10-CM

## 2020-07-21 DIAGNOSIS — G60.9 IDIOPATHIC PERIPHERAL NEUROPATHY: Primary | ICD-10-CM

## 2020-07-21 DIAGNOSIS — Z94.4 LIVER TRANSPLANTED (HCC): ICD-10-CM

## 2020-07-21 DIAGNOSIS — D68.9 BLOOD CLOTTING DISORDER (HCC): ICD-10-CM

## 2020-07-21 DIAGNOSIS — E78.2 MIXED HYPERLIPIDEMIA: ICD-10-CM

## 2020-07-21 DIAGNOSIS — I10 ESSENTIAL HYPERTENSION: ICD-10-CM

## 2020-07-21 DIAGNOSIS — M80.08XA AGE-RELATED OSTEOPOROSIS WITH CURRENT PATHOLOGICAL FRACTURE, VERTEBRA(E), INITIAL ENCOUNTER FOR FRACTURE (HCC): ICD-10-CM

## 2020-07-21 DIAGNOSIS — K74.00 HEPATIC FIBROSIS: ICD-10-CM

## 2020-07-21 DIAGNOSIS — F41.9 ANXIETY: ICD-10-CM

## 2020-07-21 DIAGNOSIS — A69.23 LYME ARTHRITIS (HCC): ICD-10-CM

## 2020-07-21 PROCEDURE — 4004F PT TOBACCO SCREEN RCVD TLK: CPT | Performed by: PHYSICIAN ASSISTANT

## 2020-07-21 PROCEDURE — 1160F RVW MEDS BY RX/DR IN RCRD: CPT | Performed by: PHYSICIAN ASSISTANT

## 2020-07-21 PROCEDURE — 3077F SYST BP >= 140 MM HG: CPT | Performed by: PHYSICIAN ASSISTANT

## 2020-07-21 PROCEDURE — 4040F PNEUMOC VAC/ADMIN/RCVD: CPT | Performed by: PHYSICIAN ASSISTANT

## 2020-07-21 PROCEDURE — 3044F HG A1C LEVEL LT 7.0%: CPT | Performed by: PHYSICIAN ASSISTANT

## 2020-07-21 PROCEDURE — 3079F DIAST BP 80-89 MM HG: CPT | Performed by: PHYSICIAN ASSISTANT

## 2020-07-21 PROCEDURE — 3008F BODY MASS INDEX DOCD: CPT | Performed by: PHYSICIAN ASSISTANT

## 2020-07-21 PROCEDURE — 99214 OFFICE O/P EST MOD 30 MIN: CPT | Performed by: PHYSICIAN ASSISTANT

## 2020-07-21 RX ORDER — DOXYCYCLINE HYCLATE 100 MG/1
100 CAPSULE ORAL EVERY 12 HOURS SCHEDULED
Qty: 42 CAPSULE | Refills: 0 | Status: SHIPPED | OUTPATIENT
Start: 2020-07-21 | End: 2020-08-11

## 2020-07-21 NOTE — TELEPHONE ENCOUNTER

## 2020-07-21 NOTE — PROGRESS NOTES
Assessment/Plan: will treat for Lyme disease with 3 weeks of doxy follow-up in a month  Getting very frustrated dealing with his chronic illnesses and pain  Refer to counseling       Diagnoses and all orders for this visit:    Idiopathic peripheral neuropathy    Malignant neoplasm of intrahepatic bile ducts ([facility] )    Immunosuppression ([facility] [facility] )  -     DXA bone density spine hip and pelvis; Future    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[facility] )  -     DXA bone density spine hip and pelvis; Future  -     doxycycline hyclate (VIBRAMYCIN) 100 mg capsule; Take 1 capsule (100 mg total) by mouth every 12 (twelve) hours for 21 days  -     Ambulatory referral to [facility]; Future    Hepatic fibrosis  -     DXA bone density spine hip and pelvis; Future    Blood clotting disorder (HCC)    Age-related osteoporosis with current pathological fracture, vertebra(e), initial encounter for fracture ([facility] )   -     DXA bone density spine hip and pelvis; Future    Essential hypertension    Other emphysema ([facility] )    Liver transplanted [facility])  -     Ambulatory referral to [facility]; Future    Mixed hyperlipidemia    Anxiety  -     Ambulatory referral to Behavioral Health; Future        No problem-specific Assessment & Plan notes found for this encounter  Subjective:      Patient ID: Latha Tilley is a 77 y o  male  He was seen by his pain management doctor today who reviewed his lab work done on July 7  Positive for Lyme disease he was sent here for further management  It is unclear to me who ordered the lab work  Has been complaining a lot of pain in his legs and back  Feeling weak and lethargic some vague pains in his shoulders  No obvious fever chills or rash  He was treated for Lyme disease in September he had joint complaints then and his joint complaints improved after he was treated  He has chronic back pain and leg pain on chronic pain medication opiates follows with pain management  History of severe COPD    He is a smoker  He always feels short of breath and has oxygen at home  He has had a liver transplant and he is followed by the transplant group   At [facility]  He is followed by Neurology for pains in his legs felt to be due to peripheral neuropathy  Diabetic off of meds A1c is well controlled  He eats well he has lost 10 lb over the past year      The following portions of the patient's history were reviewed and updated as appropriate:   He has a past medical history of Abnormal electrocardiogram, Abnormal findings on radiological examination of gastrointestinal tract, Abnormal liver enzymes, Acute hepatitis C, Adrenal disorder ([facility] ), Aneurysm of unspecified site [facility]), Benign neoplasm of skin, Bronchopneumonia, Chronic hepatitis C (HCC), Chronic obstructive asthma ([facility] ), Cirrhosis ([facility] ), Colon, diverticulosis, COPD (chronic obstructive pulmonary disease) ([facility] [facility] ), Depression, Diabetes mellitus ([facility] ), Drug dependence, continuous abuse ([facility] [facility] ), Hyperlipidemia, Hypertension, Laennec's cirrhosis (alcoholic) ([facility] ), Malabsorption syndrome, Mitral valve disorder, Murmur, Nonspecific abnormal finding on cardiac evaluation, Peripheral vascular disease ([facility] ), Pleural effusion, Pneumonia, Rectal hemorrhage, Renal failure, Respiratory abnormality, Restrictive lung disease, Testicular dysfunction, Tricuspid valve disorders, non-rheumatic, Type 2 diabetes mellitus ([address] ), and Ventral hernia ,  does not have any pertinent problems on file  ,   has a past surgical history that includes Hepatitis B Surface Ab Qn,Liver Transplant (Historical); Cholecystectomy; Hernia repair; Gallbladder surgery; Knee surgery (Left, 03/2020); Joint replacement; and Wound debridement (Left, 4/10/2020)  ,  family history includes Arthritis in his family; Cancer in his family; Coronary artery disease in his family; Diabetes in his family; Heart disease in his father; Liver disease in his family; Lung cancer in his mother  ,   reports that he has been smoking cigarettes  He has a 10 00 pack-year smoking history  He has never used smokeless tobacco  He reports that he has current or past drug history  Drug: Marijuana  He reports that he does not drink alcohol ,  is allergic to dust mite extract; muscle relief  [capsaicin]; other; grass extracts [gramineae pollens]; pollen extract; and prozac [fluoxetine]     Current Outpatient Medications   Medication Sig Dispense Refill    aspirin 81 MG tablet Take 1 tablet by mouth daily      atenolol (TENORMIN) 100 mg tablet TAKE 1 TABLET TWICE A  tablet 3    Calcium Carb-Cholecalciferol 1000-800 MG-UNIT TABS Take 1 tablet by mouth      doxycycline hyclate (VIBRAMYCIN) 100 mg capsule Take 1 capsule (100 mg total) by mouth every 12 (twelve) hours for 21 days 42 capsule 0    furosemide (LASIX) 40 mg tablet Take 1 tablet (40 mg total) by mouth daily 90 tablet 3    ipratropium-albuterol (DUO-NEB) 0 5-2 5 mg/3 mL nebulizer solution Take 1 vial (3 mL total) by nebulization 4 (four) times a day 1080 mL 3    oxyCODONE (ROXICODONE) 10 MG TABS Take 10 mg by mouth every 6 (six) hours as needed       pantoprazole (PROTONIX) 40 mg tablet TAKE 1 TABLET EVERY DAY 90 tablet 3    potassium chloride (K-DUR,KLOR-CON) 20 mEq tablet Take 1 tablet (20 mEq total) by mouth every other day 90 tablet 3    pravastatin (PRAVACHOL) 20 mg tablet Take 1 tablet (20 mg total) by mouth daily 90 tablet 3    tacrolimus (PROGRAF) 1 mg capsule Take 2 mg by mouth 2 (two) times a day        No current facility-administered medications for this visit  Review of Systems   Constitutional: Positive for fatigue  Negative for activity change, appetite change, chills, diaphoresis, fever and unexpected weight change     HENT: Negative for congestion, dental problem, drooling, ear discharge, ear pain, facial swelling, hearing loss, nosebleeds, postnasal drip, rhinorrhea, sinus pressure, sinus pain, sneezing, sore throat, tinnitus, trouble swallowing and voice change  Eyes: Negative for photophobia, pain, discharge, redness, itching and visual disturbance  Respiratory: Positive for shortness of breath  Negative for apnea, cough, choking, chest tightness, wheezing and stridor  Cardiovascular: Negative for chest pain, palpitations and leg swelling  Gastrointestinal: Negative for abdominal distention, abdominal pain, anal bleeding, blood in stool, constipation, diarrhea, nausea, rectal pain and vomiting  Endocrine: Negative for cold intolerance, heat intolerance, polydipsia, polyphagia and polyuria  Genitourinary: Negative for decreased urine volume, difficulty urinating, dysuria, enuresis, flank pain, frequency, genital sores, hematuria and urgency  Musculoskeletal: Positive for arthralgias, back pain, gait problem and myalgias  Negative for joint swelling, neck pain and neck stiffness  Skin: Negative for color change, pallor, rash and wound  Neurological: Positive for weakness  Negative for dizziness, tremors, seizures, syncope, facial asymmetry, speech difficulty, light-headedness, numbness and headaches  Hematological: Negative for adenopathy  Does not bruise/bleed easily  Psychiatric/Behavioral: Negative for agitation, behavioral problems, confusion, decreased concentration, dysphoric mood, hallucinations, self-injury, sleep disturbance and suicidal ideas  The patient is not nervous/anxious and is not hyperactive  Objective:  Vitals:    07/21/20 1307   BP: 148/88   BP Location: Left arm   Patient Position: Sitting   Cuff Size: Standard   Pulse: (!) 50   Temp: 97 5 °F (36 4 °C)   SpO2: 98%   Weight: 68 kg (150 lb)   Height: 6' (1 829 m)     Body mass index is 20 34 kg/m²  Physical Exam   Constitutional: He is oriented to person, place, and time  He appears well-developed  HENT:   Head: Normocephalic     Right Ear: External ear normal    Left Ear: External ear normal    Nose: Nose normal    Mouth/Throat: Oropharynx is clear and moist  No oropharyngeal exudate  Eyes: Pupils are equal, round, and reactive to light  Conjunctivae are normal    Neck: Neck supple  No JVD present  No thyromegaly present  Cardiovascular: Normal rate, regular rhythm, normal heart sounds and intact distal pulses  No murmur heard  Pulmonary/Chest: Effort normal and breath sounds normal  No stridor  No respiratory distress  He has no wheezes  He has no rales  He exhibits no tenderness  Abdominal: Soft  Bowel sounds are normal    Musculoskeletal: Normal range of motion  He exhibits deformity ( thoracic kyphosis walks with a cane)  He exhibits no edema or tenderness  Lymphadenopathy:     He has no cervical adenopathy  Neurological: He is alert and oriented to person, place, and time  He has normal reflexes  He displays normal reflexes  A sensory deficit is present  No cranial nerve deficit  He exhibits normal muscle tone  Coordination normal    Skin: Skin is warm and dry  Psychiatric: His speech is normal and behavior is normal  Judgment and thought content normal  Cognition and memory are normal  He exhibits a depressed mood

## 2020-07-23 ENCOUNTER — TELEPHONE (OUTPATIENT)
Dept: CARDIOLOGY CLINIC | Facility: CLINIC | Age: 65
End: 2020-07-23

## 2020-07-23 NOTE — TELEPHONE ENCOUNTER
Pt called stating he is almost out of the lasix and would like to know if he should continue because he is down to 153 lbs from 200lbs

## 2020-07-23 NOTE — TELEPHONE ENCOUNTER
Patient should continue lasix   If his shortness of breath has improved then he should decrease dose to 20mg daily instead of 40

## 2020-07-25 ENCOUNTER — TELEPHONE (OUTPATIENT)
Dept: INTERNAL MEDICINE CLINIC | Facility: CLINIC | Age: 65
End: 2020-07-25

## 2020-07-25 NOTE — TELEPHONE ENCOUNTER
Spoke to pt and he said he is in a lot of pain  Pt states he don't even feel like getting out the bed  Pt need something for pain  I informed pt Valeria Allen nor Dr Indra Broderick is in the office today  I offered him to speak to Roberto Carlos Nunez and pt declined  He said he don't feel like explaining it to someone else  Pt said he will wait to speak to Valeria Allen

## 2020-07-25 NOTE — TELEPHONE ENCOUNTER
----- Message from Efrain Aparicio sent at 7/25/2020 11:17 AM EDT -----  Regarding: Visit Follow-Up Question  Contact: [phone]  The pain is overwhelming,I need help Geovanny,I'm scared!!! I know U understand what I'm talking about

## 2020-07-30 ENCOUNTER — PROCEDURE VISIT (OUTPATIENT)
Dept: NEUROLOGY | Facility: CLINIC | Age: 65
End: 2020-07-30
Payer: MEDICARE

## 2020-07-30 ENCOUNTER — TELEPHONE (OUTPATIENT)
Dept: INTERNAL MEDICINE CLINIC | Facility: CLINIC | Age: 65
End: 2020-07-30

## 2020-07-30 DIAGNOSIS — G89.29 CHRONIC LOW BACK PAIN, UNSPECIFIED BACK PAIN LATERALITY, UNSPECIFIED WHETHER SCIATICA PRESENT: ICD-10-CM

## 2020-07-30 DIAGNOSIS — M54.50 CHRONIC LOW BACK PAIN, UNSPECIFIED BACK PAIN LATERALITY, UNSPECIFIED WHETHER SCIATICA PRESENT: ICD-10-CM

## 2020-07-30 DIAGNOSIS — M54.50 LOW BACK PAIN: ICD-10-CM

## 2020-07-30 PROCEDURE — 95910 NRV CNDJ TEST 7-8 STUDIES: CPT | Performed by: PSYCHIATRY & NEUROLOGY

## 2020-07-30 PROCEDURE — 95886 MUSC TEST DONE W/N TEST COMP: CPT | Performed by: PSYCHIATRY & NEUROLOGY

## 2020-07-30 NOTE — PROGRESS NOTES
EMG 2 limb lower extremity     Date/Time 7/30/2020 2:02 PM     Performed by  Jose Lopes MD     Authorized by Carmen Wan            EMG BILATERAL LOWER EXTREMITY    Patient reports a greater than 20 year history of numbness and tingling in the lower extremities as well as painful dysesthesias which have progressively worsened over time  He also reports low back pain occasionally radiating into the left greater than right lower extremity  Brief exam reveals marked atrophy of the musculature in the lower extremities with absent temperature and sensory sensation to above the knees bilaterally  Reflexes are hypoactive    Motor and sensory conduction studies were performed on the bilateral peroneal, tibial and sural nerves  The right peroneal distal motor latency is delayed at 7 milliseconds while the other distal motor latencies were normal  The motor action potential amplitudes were severely reduced  No response with stimulation of the peroneal nerve was obtained above the ankle  Conduction velocity of the left peroneal nerve below the fibular head is slow at 41 m/sec and across the fibular head slow at 26 m/sec  The tibial conduction velocities are slow at 38 m/sec and 35 m/sec of the right left respectively    Bilateral peroneal were not obtainable and tibial F waves were severely delayed  Bilateral sural distal sensory latencies were not clearly reproducible despite averaging  H  reflexes were not obtainable  Concentric needle EMG was performed in various distal and proximal muscles of the lower extremities bilaterally including EDB, tibialis anterior, gastrocnemius medius, vastus lateralis, biceps femoralis short head and the low lumbar paraspinal regions  There is no evidence of spontaneous activity seen  No definite interference pattern was noted at 80 dB bilaterally    Rapid firing poly phasic potentials with reduced interference patterns were noted in the tibialis anterior, gastrocnemius medius and vastus lateralis regions bilaterally  The other compound motor unit potentials were of normal configuration and interference patterns were full or full for effort  IMPRESSION: This is an abnormal EMG of the bilateral lower extremities due to evidence of a severe mixed motor sensory length dependent peripheral neuropathy with demyelinative and chronic axonal change most severe in the peroneal distribution right greater than left      TRISH Nguyen

## 2020-07-30 NOTE — TELEPHONE ENCOUNTER
CALLED PT   AND STATED HE IS TAKING THE ABX DOXY AND FEELS IT IS NOT HELPING HIM THIS TIME HAVING TROUBLE WALKING UP STAIRS AND HIS JOINTS HURT, HE WILL BE SEEING DR Katie Forte TODAY FOR EMG AND HAS LEFT [facility] AFTER 3 YRS AS FELTT THEY WERE NOT HELPING HIM, AND DOES NOT WANT TO SELF MEDICATE

## 2020-07-30 NOTE — TELEPHONE ENCOUNTER
----- Message from Felipe Patel sent at 7/30/2020  7:45 AM EDT -----  Regarding: FW: Visit Follow-Up Question  Contact: [phone]  Please triage this- and if approp may need appt    ----- Message -----  From: Srinivasa Aviles  Sent: 7/29/2020   2:26 PM EDT  To: JOSE Patel  Subject: FW: Visit Follow-Up Question                         ----- Message -----  From: Jose R Govea  Sent: 7/29/2020   2:23 PM EDT  To: [facility] Clinical  Subject: Visit Follow-Up Question                         Obey Matta,could U please call me when U have a minute,it's about Lyme,I'm having problems & don't know where to turn no one is returning my calls   I'm very concerned !!![phone]

## 2020-08-04 DIAGNOSIS — A69.23 LYME ARTHRITIS (HCC): ICD-10-CM

## 2020-08-04 RX ORDER — DOXYCYCLINE HYCLATE 100 MG/1
100 CAPSULE ORAL EVERY 12 HOURS SCHEDULED
Qty: 42 CAPSULE | Refills: 0 | OUTPATIENT
Start: 2020-08-04 | End: 2020-08-25

## 2020-08-07 ENCOUNTER — HOSPITAL ENCOUNTER (OUTPATIENT)
Dept: MAMMOGRAPHY | Facility: CLINIC | Age: 65
Discharge: HOME | End: 2020-08-07
Payer: MEDICARE

## 2020-08-07 ENCOUNTER — TELEPHONE (OUTPATIENT)
Dept: NEUROLOGY | Facility: CLINIC | Age: 65
End: 2020-08-07

## 2020-08-07 DIAGNOSIS — M80.08XA AGE-RELATED OSTEOPOROSIS WITH CURRENT PATHOLOGICAL FRACTURE, VERTEBRA(E), INITIAL ENCOUNTER FOR FRACTURE (HCC): ICD-10-CM

## 2020-08-07 DIAGNOSIS — D84.9 IMMUNOSUPPRESSION (HCC): ICD-10-CM

## 2020-08-07 DIAGNOSIS — A69.23 LYME ARTHRITIS (HCC): ICD-10-CM

## 2020-08-07 DIAGNOSIS — K74.00 HEPATIC FIBROSIS: ICD-10-CM

## 2020-08-07 PROCEDURE — 77080 DXA BONE DENSITY AXIAL: CPT

## 2020-08-25 ENCOUNTER — TELEMEDICINE (OUTPATIENT)
Dept: INTERNAL MEDICINE CLINIC | Facility: CLINIC | Age: 65
End: 2020-08-25
Payer: MEDICARE

## 2020-08-25 DIAGNOSIS — B18.2 HEPATITIS C VIRUS CARRIER STATE (HCC): ICD-10-CM

## 2020-08-25 DIAGNOSIS — A69.23 LYME ARTHRITIS (HCC): ICD-10-CM

## 2020-08-25 DIAGNOSIS — D84.9 IMMUNOSUPPRESSION (HCC): ICD-10-CM

## 2020-08-25 DIAGNOSIS — Z72.0 TOBACCO ABUSE: Primary | ICD-10-CM

## 2020-08-25 DIAGNOSIS — G60.9 IDIOPATHIC PERIPHERAL NEUROPATHY: ICD-10-CM

## 2020-08-25 DIAGNOSIS — I10 ESSENTIAL HYPERTENSION: ICD-10-CM

## 2020-08-25 PROCEDURE — 3044F HG A1C LEVEL LT 7.0%: CPT | Performed by: PHYSICIAN ASSISTANT

## 2020-08-25 PROCEDURE — 4040F PNEUMOC VAC/ADMIN/RCVD: CPT | Performed by: PHYSICIAN ASSISTANT

## 2020-08-25 PROCEDURE — 4004F PT TOBACCO SCREEN RCVD TLK: CPT | Performed by: PHYSICIAN ASSISTANT

## 2020-08-25 PROCEDURE — 1160F RVW MEDS BY RX/DR IN RCRD: CPT | Performed by: PHYSICIAN ASSISTANT

## 2020-08-25 PROCEDURE — 99214 OFFICE O/P EST MOD 30 MIN: CPT | Performed by: PHYSICIAN ASSISTANT

## 2020-08-25 PROCEDURE — 3079F DIAST BP 80-89 MM HG: CPT | Performed by: PHYSICIAN ASSISTANT

## 2020-08-25 PROCEDURE — 3077F SYST BP >= 140 MM HG: CPT | Performed by: PHYSICIAN ASSISTANT

## 2020-08-25 NOTE — PROGRESS NOTES
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[facility] )       Other    Immunosuppression ([facility] )    Tobacco abuse - Primary    Hepatitis C virus carrier [facility])               Reason for visit is   Chief Complaint   Patient presents with    Virtual Regular Visit    Virtual Regular Visit        Encounter provider Theo Blake PA-C    Provider located at [address] [place] [address]      Recent Visits  No visits were found meeting these conditions  Showing recent visits within past 7 days and meeting all other requirements     Today's Visits  Date Type Provider Dept   08/25/20 Telemedicine Theo Blake, [address] [facility]  today's visits and meeting all other requirements     Future Appointments  No visits were found meeting these conditions  Showing future appointments within next 150 days and meeting all other requirements        The patient was identified by name and date of birth  Vero Aparicio was informed that this is a telemedicine visit and that the visit is being conducted through [facility] and patient was informed that this is not a secure, HIPAA-complaint platform  He agrees to proceed     My office door was closed  No one else was in the room  He acknowledged consent and understanding of privacy and security of the video platform   The patient has agreed to participate and understands they can discontinue the visit at any time  Patient is aware this is a billable service  Subjective  Vandana Meadows is a 77 y o  male    For follow-up  Seen a month ago because of continued problem with pains in his back and legs also fatigue and other joint complaints his Lyme test was positive and he was treated with 3 weeks of doxycycline some of his aches and pains  Have improved his fatigue has improved  He continues with back and leg pain especially painful to walk  His pain interferes with his function  Very for frustrated at his long-term problem with pain and difficulty with mobility  Had an EMG recently which indicated severe neuropathy  This  Has been noted previously  Currently off of opiates for several months  Uses marijuana for pain management currently not following with pain management doctors    He has had positive rheumatoid factor as well history of liver transplant doing well in that regard follows with the transplant team        Past Medical History:   Diagnosis Date    Abnormal electrocardiogram     Abnormal findings on radiological examination of gastrointestinal tract     Abnormal liver enzymes     Acute hepatitis C     Adrenal disorder (HCC)     Aneurysm of unspecified site [facility]     Benign neoplasm of skin     Bronchopneumonia     Chronic hepatitis C (HCC)     Chronic obstructive asthma (HCC)     Cirrhosis ([facility] )     Colon, diverticulosis     COPD (chronic obstructive pulmonary disease) ([facility] )     Depression     Diabetes mellitus ([facility] )     Drug dependence, continuous abuse ([address] )     Hyperlipidemia     Hypertension     Laennec's cirrhosis (alcoholic) ([address] )     Malabsorption syndrome     Mitral valve disorder     Murmur     Nonspecific abnormal finding on cardiac evaluation     Peripheral vascular disease (HCC)     Pleural effusion     Pneumonia     Rectal hemorrhage     Renal failure     Respiratory abnormality     Restrictive lung disease     lung disease other not elsewhere class     Testicular dysfunction     testicular hypfunction other     Tricuspid valve disorders, non-rheumatic     Type 2 diabetes mellitus (HCC)     uncomplicated controlled      Ventral hernia        Past Surgical History:   Procedure Laterality Date    CHOLECYSTECTOMY      GALLBLADDER SURGERY      HEPATITIS B SURFACE AB QN,LIVER TRANSPLANT (HISTORICAL)      HERNIA REPAIR      JOINT REPLACEMENT      L knee 3/10/20    KNEE SURGERY Left 03/2020    WOUND DEBRIDEMENT Left 4/10/2020    Procedure: EXCISIONAL DEBRIDEMENT;  Surgeon: Ruben Mora MD;  Location: [facility];  Service: General       Current Outpatient Medications   Medication Sig Dispense Refill    aspirin 81 MG tablet Take 1 tablet by mouth daily      atenolol (TENORMIN) 100 mg tablet TAKE 1 TABLET TWICE A  tablet 3    Calcium Carb-Cholecalciferol 1000-800 MG-UNIT TABS Take 1 tablet by mouth      furosemide (LASIX) 40 mg tablet Take 1 tablet (40 mg total) by mouth daily 90 tablet 3    ipratropium-albuterol (DUO-NEB) 0 5-2 5 mg/3 mL nebulizer solution Take 1 vial (3 mL total) by nebulization 4 (four) times a day 1080 mL 3    pantoprazole (PROTONIX) 40 mg tablet TAKE 1 TABLET EVERY DAY 90 tablet 3    potassium chloride (K-DUR,KLOR-CON) 20 mEq tablet Take 1 tablet (20 mEq total) by mouth every other day 90 tablet 3    pravastatin (PRAVACHOL) 20 mg tablet Take 1 tablet (20 mg total) by mouth daily 90 tablet 3    tacrolimus (PROGRAF) 1 mg capsule Take 2 mg by mouth 2 (two) times a day        No current facility-administered medications for this visit           Allergies   Allergen Reactions    Dust Mite Extract Cough, Eye Swelling, Itching and Wheezing    Muscle Relief  [Capsaicin] Confusion, Headache, Lightheadedness, Palpitations, Tinnitus and Vomiting    Other      Muscle relaxer's causes vomiting and nausea     Grass Extracts [Gramineae Pollens] Hives and Sneezing    Pollen Extract Hives and Sneezing     Watery Eyes    Prozac [Fluoxetine]        Review of Systems    Video Exam    There were no vitals filed for this visit  Physical Exam  Constitutional:       General: He is in acute distress  HENT:      Head: Normocephalic  Eyes:      Extraocular Movements: Extraocular movements intact  Pulmonary:      Effort: Pulmonary effort is normal  No respiratory distress  Neurological:      Mental Status: He is alert and oriented to person, place, and time  Psychiatric:         Mood and Affect: Mood is depressed  Affect is angry  Speech: Speech is rapid and pressured  Behavior: Behavior is aggressive  I spent   Twenty minutes directly with the patient during this visit      VIRTUAL VISIT DISCLAIMER    Landry Alas acknowledges that he has consented to an online visit or consultation  He understands that the online visit is based solely on information provided by him, and that, in the absence of a face-to-face physical evaluation by the physician, the diagnosis he receives is both limited and provisional in terms of accuracy and completeness  This is not intended to replace a full medical face-to-face evaluation by the physician  Landry Alas understands and accepts these terms

## 2020-09-08 ENCOUNTER — HOSPITAL ENCOUNTER (OUTPATIENT)
Dept: ULTRASOUND IMAGING | Facility: CLINIC | Age: 65
Discharge: HOME | End: 2020-09-08
Payer: MEDICARE

## 2020-09-08 ENCOUNTER — TRANSCRIBE ORDERS (OUTPATIENT)
Dept: ADMINISTRATIVE | Facility: HOSPITAL | Age: 65
End: 2020-09-08

## 2020-09-08 DIAGNOSIS — K74.00 HEPATIC FIBROSIS: ICD-10-CM

## 2020-09-08 DIAGNOSIS — Z94.4 LIVER REPLACED BY TRANSPLANT (HCC): Primary | ICD-10-CM

## 2020-09-08 DIAGNOSIS — Z94.4 LIVER REPLACED BY TRANSPLANT (HCC): ICD-10-CM

## 2020-09-08 PROCEDURE — 76700 US EXAM ABDOM COMPLETE: CPT

## 2020-12-23 ENCOUNTER — TELEPHONE (OUTPATIENT)
Dept: INTERNAL MEDICINE CLINIC | Facility: CLINIC | Age: 65
End: 2020-12-23

## 2020-12-28 DIAGNOSIS — E78.2 MIXED HYPERLIPIDEMIA: ICD-10-CM

## 2020-12-28 DIAGNOSIS — R73.01 IMPAIRED FASTING GLUCOSE: ICD-10-CM

## 2020-12-28 DIAGNOSIS — Z12.5 SCREENING FOR PROSTATE CANCER: ICD-10-CM

## 2020-12-28 DIAGNOSIS — Z94.4 LIVER TRANSPLANTED (HCC): ICD-10-CM

## 2020-12-28 DIAGNOSIS — Z72.0 TOBACCO ABUSE: Primary | ICD-10-CM

## 2020-12-28 DIAGNOSIS — I10 ESSENTIAL HYPERTENSION: ICD-10-CM

## 2021-01-09 DIAGNOSIS — E78.5 HYPERLIPIDEMIA, UNSPECIFIED HYPERLIPIDEMIA TYPE: ICD-10-CM

## 2021-01-11 RX ORDER — PRAVASTATIN SODIUM 20 MG
TABLET ORAL
Qty: 90 TABLET | Refills: 3 | Status: SHIPPED | OUTPATIENT
Start: 2021-01-11 | End: 2022-02-26

## 2021-02-03 ENCOUNTER — LAB (OUTPATIENT)
Dept: LAB | Facility: CLINIC | Age: 66
End: 2021-02-03
Payer: MEDICARE

## 2021-02-03 ENCOUNTER — TRANSCRIBE ORDERS (OUTPATIENT)
Dept: LAB | Facility: CLINIC | Age: 66
End: 2021-02-03

## 2021-02-03 DIAGNOSIS — R73.01 IMPAIRED FASTING GLUCOSE: ICD-10-CM

## 2021-02-03 DIAGNOSIS — D68.9 BLOOD CLOTTING DISORDER (HCC): ICD-10-CM

## 2021-02-03 DIAGNOSIS — R79.1 ABNORMAL COAGULATION PROFILE: ICD-10-CM

## 2021-02-03 DIAGNOSIS — Z94.4 LIVER REPLACED BY TRANSPLANT (HCC): ICD-10-CM

## 2021-02-03 DIAGNOSIS — C22.1 MALIGNANT NEOPLASM OF INTRAHEPATIC BILE DUCTS (HCC): ICD-10-CM

## 2021-02-03 DIAGNOSIS — Z12.5 SCREENING FOR PROSTATE CANCER: ICD-10-CM

## 2021-02-03 DIAGNOSIS — I10 ESSENTIAL HYPERTENSION: ICD-10-CM

## 2021-02-03 DIAGNOSIS — E78.2 MIXED HYPERLIPIDEMIA: ICD-10-CM

## 2021-02-03 DIAGNOSIS — Z94.4 LIVER REPLACED BY TRANSPLANT (HCC): Primary | ICD-10-CM

## 2021-02-03 LAB
ALBUMIN SERPL BCP-MCNC: 4.1 G/DL (ref 3.5–5)
ALP SERPL-CCNC: 60 U/L (ref 46–116)
ALT SERPL W P-5'-P-CCNC: 19 U/L (ref 12–78)
ANION GAP SERPL CALCULATED.3IONS-SCNC: 3 MMOL/L (ref 4–13)
AST SERPL W P-5'-P-CCNC: 18 U/L (ref 5–45)
BASOPHILS # BLD AUTO: 0.05 THOUSANDS/ΜL (ref 0–0.1)
BASOPHILS NFR BLD AUTO: 1 % (ref 0–1)
BILIRUB SERPL-MCNC: 1.24 MG/DL (ref 0.2–1)
BUN SERPL-MCNC: 12 MG/DL (ref 5–25)
CALCIUM SERPL-MCNC: 9.5 MG/DL (ref 8.3–10.1)
CHLORIDE SERPL-SCNC: 100 MMOL/L (ref 100–108)
CHOLEST SERPL-MCNC: 189 MG/DL (ref 50–200)
CO2 SERPL-SCNC: 29 MMOL/L (ref 21–32)
CREAT SERPL-MCNC: 1.12 MG/DL (ref 0.6–1.3)
EOSINOPHIL # BLD AUTO: 0.08 THOUSAND/ΜL (ref 0–0.61)
EOSINOPHIL NFR BLD AUTO: 2 % (ref 0–6)
ERYTHROCYTE [DISTWIDTH] IN BLOOD BY AUTOMATED COUNT: 13.1 % (ref 11.6–15.1)
EST. AVERAGE GLUCOSE BLD GHB EST-MCNC: 117 MG/DL
GFR SERPL CREATININE-BSD FRML MDRD: 67 ML/MIN/1.73SQ M
GGT SERPL-CCNC: 29 U/L (ref 5–85)
GLUCOSE P FAST SERPL-MCNC: 96 MG/DL (ref 65–99)
HBA1C MFR BLD: 5.7 %
HCT VFR BLD AUTO: 43.4 % (ref 36.5–49.3)
HDLC SERPL-MCNC: 59 MG/DL
HGB BLD-MCNC: 14.4 G/DL (ref 12–17)
IMM GRANULOCYTES # BLD AUTO: 0.02 THOUSAND/UL (ref 0–0.2)
IMM GRANULOCYTES NFR BLD AUTO: 0 % (ref 0–2)
INR PPP: 1.06 (ref 0.84–1.19)
LDLC SERPL CALC-MCNC: 110 MG/DL (ref 0–100)
LYMPHOCYTES # BLD AUTO: 1.66 THOUSANDS/ΜL (ref 0.6–4.47)
LYMPHOCYTES NFR BLD AUTO: 32 % (ref 14–44)
MAGNESIUM SERPL-MCNC: 1.8 MG/DL (ref 1.6–2.6)
MCH RBC QN AUTO: 32.7 PG (ref 26.8–34.3)
MCHC RBC AUTO-ENTMCNC: 33.2 G/DL (ref 31.4–37.4)
MCV RBC AUTO: 99 FL (ref 82–98)
MONOCYTES # BLD AUTO: 0.34 THOUSAND/ΜL (ref 0.17–1.22)
MONOCYTES NFR BLD AUTO: 7 % (ref 4–12)
NEUTROPHILS # BLD AUTO: 2.97 THOUSANDS/ΜL (ref 1.85–7.62)
NEUTS SEG NFR BLD AUTO: 58 % (ref 43–75)
NONHDLC SERPL-MCNC: 130 MG/DL
NRBC BLD AUTO-RTO: 0 /100 WBCS
PLATELET # BLD AUTO: 199 THOUSANDS/UL (ref 149–390)
PMV BLD AUTO: 10.1 FL (ref 8.9–12.7)
POTASSIUM SERPL-SCNC: 4.5 MMOL/L (ref 3.5–5.3)
PROT SERPL-MCNC: 7.8 G/DL (ref 6.4–8.2)
PROTHROMBIN TIME: 13.8 SECONDS (ref 11.6–14.5)
PSA SERPL-MCNC: 0.5 NG/ML (ref 0–4)
RBC # BLD AUTO: 4.4 MILLION/UL (ref 3.88–5.62)
SODIUM SERPL-SCNC: 132 MMOL/L (ref 136–145)
TRIGL SERPL-MCNC: 101 MG/DL
TSH SERPL DL<=0.05 MIU/L-ACNC: 1.21 UIU/ML (ref 0.36–3.74)
WBC # BLD AUTO: 5.12 THOUSAND/UL (ref 4.31–10.16)

## 2021-02-03 PROCEDURE — 83036 HEMOGLOBIN GLYCOSYLATED A1C: CPT

## 2021-02-03 PROCEDURE — 80061 LIPID PANEL: CPT

## 2021-02-03 PROCEDURE — 85025 COMPLETE CBC W/AUTO DIFF WBC: CPT

## 2021-02-03 PROCEDURE — G0103 PSA SCREENING: HCPCS

## 2021-02-03 PROCEDURE — 82977 ASSAY OF GGT: CPT

## 2021-02-03 PROCEDURE — 36415 COLL VENOUS BLD VENIPUNCTURE: CPT

## 2021-02-03 PROCEDURE — 80053 COMPREHEN METABOLIC PANEL: CPT

## 2021-02-03 PROCEDURE — 85610 PROTHROMBIN TIME: CPT

## 2021-02-03 PROCEDURE — 84443 ASSAY THYROID STIM HORMONE: CPT

## 2021-02-03 PROCEDURE — 83735 ASSAY OF MAGNESIUM: CPT

## 2021-02-03 PROCEDURE — 80197 ASSAY OF TACROLIMUS: CPT

## 2021-02-04 LAB — TACROLIMUS BLD-MCNC: 6.2 NG/ML (ref 2–20)

## 2021-02-05 ENCOUNTER — TELEPHONE (OUTPATIENT)
Dept: INTERNAL MEDICINE CLINIC | Facility: CLINIC | Age: 66
End: 2021-02-05

## 2021-02-05 ENCOUNTER — OFFICE VISIT (OUTPATIENT)
Dept: INTERNAL MEDICINE CLINIC | Facility: CLINIC | Age: 66
End: 2021-02-05
Payer: MEDICARE

## 2021-02-05 ENCOUNTER — LAB (OUTPATIENT)
Dept: LAB | Facility: CLINIC | Age: 66
End: 2021-02-05
Payer: MEDICARE

## 2021-02-05 ENCOUNTER — TELEPHONE (OUTPATIENT)
Dept: NEUROLOGY | Facility: CLINIC | Age: 66
End: 2021-02-05

## 2021-02-05 VITALS
HEART RATE: 66 BPM | SYSTOLIC BLOOD PRESSURE: 152 MMHG | OXYGEN SATURATION: 98 % | DIASTOLIC BLOOD PRESSURE: 78 MMHG | TEMPERATURE: 97.2 F | HEIGHT: 72 IN | BODY MASS INDEX: 23.54 KG/M2 | WEIGHT: 173.8 LBS

## 2021-02-05 DIAGNOSIS — D84.9 IMMUNOSUPPRESSION (HCC): ICD-10-CM

## 2021-02-05 DIAGNOSIS — F17.200 SMOKING: ICD-10-CM

## 2021-02-05 DIAGNOSIS — R73.01 IMPAIRED FASTING GLUCOSE: ICD-10-CM

## 2021-02-05 DIAGNOSIS — E78.2 MIXED HYPERLIPIDEMIA: ICD-10-CM

## 2021-02-05 DIAGNOSIS — R09.02 HYPOXIA: Primary | ICD-10-CM

## 2021-02-05 DIAGNOSIS — R41.3 MEMORY LOSS: ICD-10-CM

## 2021-02-05 DIAGNOSIS — N40.1 BENIGN PROSTATIC HYPERPLASIA WITH LOWER URINARY TRACT SYMPTOMS, SYMPTOM DETAILS UNSPECIFIED: ICD-10-CM

## 2021-02-05 DIAGNOSIS — I10 ESSENTIAL HYPERTENSION: ICD-10-CM

## 2021-02-05 DIAGNOSIS — Z94.4 LIVER TRANSPLANTED (HCC): Primary | ICD-10-CM

## 2021-02-05 DIAGNOSIS — J43.8 OTHER EMPHYSEMA (HCC): ICD-10-CM

## 2021-02-05 DIAGNOSIS — Z80.42 FAMILY HISTORY OF PROSTATE CANCER: ICD-10-CM

## 2021-02-05 DIAGNOSIS — R30.0 DYSURIA: ICD-10-CM

## 2021-02-05 DIAGNOSIS — A69.23 LYME ARTHRITIS (HCC): ICD-10-CM

## 2021-02-05 DIAGNOSIS — I25.10 ARTERIOSCLEROSIS OF CORONARY ARTERY: ICD-10-CM

## 2021-02-05 LAB
BACTERIA UR QL AUTO: NORMAL /HPF
BILIRUB UR QL STRIP: NEGATIVE
CLARITY UR: CLEAR
COLOR UR: ABNORMAL
GLUCOSE UR STRIP-MCNC: NEGATIVE MG/DL
HGB UR QL STRIP.AUTO: NEGATIVE
HYALINE CASTS #/AREA URNS LPF: NORMAL /LPF
KETONES UR STRIP-MCNC: NEGATIVE MG/DL
LEUKOCYTE ESTERASE UR QL STRIP: NEGATIVE
NITRITE UR QL STRIP: NEGATIVE
NON-SQ EPI CELLS URNS QL MICRO: NORMAL /HPF
PH UR STRIP.AUTO: 6.5 [PH]
PROT UR STRIP-MCNC: ABNORMAL MG/DL
RBC #/AREA URNS AUTO: NORMAL /HPF
SP GR UR STRIP.AUTO: 1.02 (ref 1–1.03)
UROBILINOGEN UR QL STRIP.AUTO: 1 E.U./DL
WBC #/AREA URNS AUTO: NORMAL /HPF

## 2021-02-05 PROCEDURE — 99214 OFFICE O/P EST MOD 30 MIN: CPT | Performed by: NURSE PRACTITIONER

## 2021-02-05 PROCEDURE — G0438 PPPS, INITIAL VISIT: HCPCS | Performed by: NURSE PRACTITIONER

## 2021-02-05 PROCEDURE — 1123F ACP DISCUSS/DSCN MKR DOCD: CPT | Performed by: NURSE PRACTITIONER

## 2021-02-05 PROCEDURE — 81001 URINALYSIS AUTO W/SCOPE: CPT | Performed by: NURSE PRACTITIONER

## 2021-02-05 RX ORDER — TAMSULOSIN HYDROCHLORIDE 0.4 MG/1
0.4 CAPSULE ORAL
Qty: 30 CAPSULE | Refills: 5 | Status: SHIPPED | OUTPATIENT
Start: 2021-02-05 | End: 2021-08-16 | Stop reason: CLARIF

## 2021-02-05 NOTE — TELEPHONE ENCOUNTER
----- Message from Felipe Shah Rowena  sent at 2/5/2021  1:39 PM EST -----  Urine was normal- I sent him in a medication called flomax that may help him with his urine issues   Take in the evening and monitor for dizziness- I also put a referral in urology w/ his family hx of prostate ca

## 2021-02-05 NOTE — TELEPHONE ENCOUNTER
Best contact number for [facility]:[phone]    Emergency Contact name and number:    Referring provider and telephone number:[facility] Provider Name and if affiliated with [facility]: Frederick Kim    Reason for Appointment/Dx:Memory loss    Neurology Location patient would like to be seen:    Order received? Yes                                                 Records Received? No    Have you ever seen another Neurologist?       No    Insurance Information    Insurance Name:Medicare A and B    ID/Policy #:    Secondary Insurance:[employer]    ID/Policy#: Workman's Comp/ Accident/ School  Information      Workman's Comp/Accident/School related?        No    If yes name of Insurance company:    Date of Injury:    Type of Injury:    [address] Name and Telephone Number:    Notes:Samuel Abdul pt pack sent                   Appointment date: 05/04/21 9:40am

## 2021-02-05 NOTE — PROGRESS NOTES
Assessment/Plan:    Patient Instructions   Centrilobular emphysema- currently on 02 needs recert- In office today on room air 92 % pt dropped during 6 min ambulation to 88% - remained 88% at recovery after 2 mins back up to 92 % at rest      Dysuria/BPH- Obtain UA today in the office  Depending on results may refer to urology PSA stable father w/ hx of prostate ca    Memory loss/decreased concentration- Consult neurology for evaluation  tobacco use- still smoking 2-5 cigs/day Obtain low- dose CT screening    Hyperlipidemia- obtain labs in 6 months and follow up in the office    S/p liver transplant following w/ transplant team    Chronic pain/neuropathy- prn medical MJ    Cad Stable without angina continue to modify risk factors    DM2- stable without meds             Diagnoses and all orders for this visit:    Liver transplanted ([facility] )    Dysuria  -     UA w Reflex to Microscopic w Reflex to Culture -Lab Collect    Memory loss  -     Ambulatory referral to Neurology; Future    Smoking  -     CT lung screening program; Future    Immunosuppression ([facility] )    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[facility] )    Other emphysema (HCC)    Impaired fasting glucose  -     Hemoglobin A1C; Future    Arteriosclerosis of coronary artery    Essential hypertension  -     CBC and differential; Future  -     Comprehensive metabolic panel; Future    Mixed hyperlipidemia  -     Lipid panel; Future  -     TSH, 3rd generation with Free T4 reflex; Future         Subjective:      Patient ID: Silvio Pederson is a 74 y o  male    Patient presents today for a follow up appt in the office  Patient reports he is in need for recertification for oxygen use at home  He is using the oxygen at night and PRN throughout the day with activity  He reports continued pain in his BL feet and has been using medical marijuana which has been effective for the pain  He has been walking daily   He reports discomfort with urination- a "pressure when he needs to urinate, after he goes this subsides  He also feels he urinates more frequently than he used to  He is also describing a "foggy" headed symptom throughout the day  He is having difficulty concentration   He denies any headache, vision issues, or dizziness        Current Outpatient Medications:     aspirin 81 MG tablet, Take 1 tablet by mouth daily, Disp: , Rfl:     atenolol (TENORMIN) 100 mg tablet, TAKE 1 TABLET TWICE A DAY, Disp: 180 tablet, Rfl: 3    furosemide (LASIX) 40 mg tablet, Take 1 tablet (40 mg total) by mouth daily, Disp: 90 tablet, Rfl: 3    pantoprazole (PROTONIX) 40 mg tablet, TAKE 1 TABLET EVERY DAY, Disp: 90 tablet, Rfl: 3    potassium chloride (K-DUR,KLOR-CON) 20 mEq tablet, Take 1 tablet (20 mEq total) by mouth every other day, Disp: 90 tablet, Rfl: 3    pravastatin (PRAVACHOL) 20 mg tablet, TAKE 1 TABLET BY MOUTH EVERY DAY, Disp: 90 tablet, Rfl: 3    tacrolimus (PROGRAF) 1 mg capsule, Take 2 mg by mouth 2 (two) times a day , Disp: , Rfl:     Calcium Carb-Cholecalciferol 1000-800 MG-UNIT TABS, Take 1 tablet by mouth, Disp: , Rfl:     ipratropium-albuterol (DUO-NEB) 0 5-2 5 mg/3 mL nebulizer solution, Take 1 vial (3 mL total) by nebulization 4 (four) times a day, Disp: 1080 mL, Rfl: 3    Recent Results (from the past 1008 hour(s))   CBC and differential    Collection Time: 02/03/21 11:35 AM   Result Value Ref Range    WBC 5 12 4 31 - 10 16 Thousand/uL    RBC 4 40 3 88 - 5 62 Million/uL    Hemoglobin 14 4 12 0 - 17 0 g/dL    Hematocrit 43 4 36 5 - 49 3 %    MCV 99 (H) 82 - 98 fL    MCH 32 7 26 8 - 34 3 pg    MCHC 33 2 31 4 - 37 4 g/dL    RDW 13 1 11 6 - 15 1 %    MPV 10 1 8 9 - 12 7 fL    Platelets 606 106 - 307 Thousands/uL    nRBC 0 /100 WBCs    Neutrophils Relative 58 43 - 75 %    Immat GRANS % 0 0 - 2 %    Lymphocytes Relative 32 14 - 44 %    Monocytes Relative 7 4 - 12 %    Eosinophils Relative 2 0 - 6 %    Basophils Relative 1 0 - 1 %    Neutrophils Absolute 2 97 1 85 - 7 62 Thousands/µL    Immature Grans Absolute 0 02 0 00 - 0 20 Thousand/uL    Lymphocytes Absolute 1 66 0 60 - 4 47 Thousands/µL    Monocytes Absolute 0 34 0 17 - 1 22 Thousand/µL    Eosinophils Absolute 0 08 0 00 - 0 61 Thousand/µL    Basophils Absolute 0 05 0 00 - 0 10 Thousands/µL   Comprehensive metabolic panel    Collection Time: 02/03/21 11:35 AM   Result Value Ref Range    Sodium 132 (L) 136 - 145 mmol/L    Potassium 4 5 3 5 - 5 3 mmol/L    Chloride 100 100 - 108 mmol/L    CO2 29 21 - 32 mmol/L    ANION GAP 3 (L) 4 - 13 mmol/L    BUN 12 5 - 25 mg/dL    Creatinine 1 12 0 60 - 1 30 mg/dL    Glucose, Fasting 96 65 - 99 mg/dL    Calcium 9 5 8 3 - 10 1 mg/dL    AST 18 5 - 45 U/L    ALT 19 12 - 78 U/L    Alkaline Phosphatase 60 46 - 116 U/L    Total Protein 7 8 6 4 - 8 2 g/dL    Albumin 4 1 3 5 - 5 0 g/dL    Total Bilirubin 1 24 (H) 0 20 - 1 00 mg/dL    eGFR 67 ml/min/1 73sq m   Lipid panel    Collection Time: 02/03/21 11:35 AM   Result Value Ref Range    Cholesterol 189 50 - 200 mg/dL    Triglycerides 101 <=150 mg/dL    HDL, Direct 59 >=40 mg/dL    LDL Calculated 110 (H) 0 - 100 mg/dL    Non-HDL-Chol (CHOL-HDL) 130 mg/dl   Hemoglobin A1C    Collection Time: 02/03/21 11:35 AM   Result Value Ref Range    Hemoglobin A1C 5 7 (H) Normal 3 8-5 6%; PreDiabetic 5 7-6 4%;  Diabetic >=6 5%; Glycemic control for adults with diabetes <7 0% %     mg/dl   TSH, 3rd generation with Free T4 reflex    Collection Time: 02/03/21 11:35 AM   Result Value Ref Range    TSH 3RD GENERATON 1 210 0 358 - 3 740 uIU/mL   PSA, Total Screen    Collection Time: 02/03/21 11:35 AM   Result Value Ref Range    PSA 0 5 0 0 - 4 0 ng/mL   Magnesium    Collection Time: 02/03/21 11:35 AM   Result Value Ref Range    Magnesium 1 8 1 6 - 2 6 mg/dL   Protime-INR    Collection Time: 02/03/21 11:35 AM   Result Value Ref Range    Protime 13 8 11 6 - 14 5 seconds    INR 1 06 0 84 - 1 19   Gamma GT    Collection Time: 02/03/21 11:35 AM   Result Value Ref Range    GGT 29 5 - 85 U/L   Tacrolimus level    Collection Time: 02/03/21 11:35 AM   Result Value Ref Range    TACROLIMUS 6 2 2 0 - 20 0 ng/mL       The following portions of the patient's history were reviewed and updated as appropriate: allergies, current medications, past family history, past medical history, past social history, past surgical history and problem list      Review of Systems   Constitutional: Negative for appetite change, chills, diaphoresis, fatigue, fever and unexpected weight change  HENT: Negative for postnasal drip and sneezing  Eyes: Negative for visual disturbance  Respiratory: Negative for chest tightness and shortness of breath  Cardiovascular: Negative for chest pain, palpitations and leg swelling  Gastrointestinal: Negative for abdominal pain and blood in stool  Endocrine: Negative for cold intolerance, heat intolerance, polydipsia, polyphagia and polyuria  Genitourinary: Positive for dysuria and frequency  Negative for difficulty urinating and urgency  Musculoskeletal: Negative for arthralgias and myalgias  Skin: Negative for rash and wound  Neurological: Negative for dizziness, weakness, light-headedness and headaches  BL foot pain   Hematological: Negative for adenopathy  Psychiatric/Behavioral: Positive for decreased concentration  Negative for confusion, dysphoric mood and sleep disturbance  The patient is not nervous/anxious  Objective:      /78 (BP Location: Left arm, Patient Position: Sitting, Cuff Size: Standard)   Pulse 66   Temp (!) 97 2 °F (36 2 °C)   Ht 6' (1 829 m)   Wt 78 8 kg (173 lb 12 8 oz)   SpO2 98%   BMI 23 57 kg/m²        Physical Exam  Constitutional:       General: He is not in acute distress  Appearance: He is well-developed  He is not diaphoretic  HENT:      Head: Normocephalic and atraumatic  Nose: Nose normal    Eyes:      Conjunctiva/sclera: Conjunctivae normal       Pupils: Pupils are equal, round, and reactive to light     Neck: Musculoskeletal: Normal range of motion and neck supple  Thyroid: No thyromegaly  Vascular: No JVD  Trachea: No tracheal deviation  Cardiovascular:      Rate and Rhythm: Normal rate and regular rhythm  Heart sounds: Normal heart sounds  No murmur  No friction rub  No gallop  Pulmonary:      Effort: Pulmonary effort is normal  No respiratory distress  Breath sounds: No wheezing or rales  Comments: Decreased lung sounds in all lung fields  Abdominal:      General: Bowel sounds are normal  There is no distension  Palpations: Abdomen is soft  Tenderness: There is no abdominal tenderness  Musculoskeletal: Normal range of motion  Lymphadenopathy:      Cervical: No cervical adenopathy  Skin:     General: Skin is warm and dry  Findings: No rash  Neurological:      Mental Status: He is alert and oriented to person, place, and time  Cranial Nerves: No cranial nerve deficit  Psychiatric:         Behavior: Behavior normal          Thought Content:  Thought content normal          Judgment: Judgment normal

## 2021-02-05 NOTE — PATIENT INSTRUCTIONS
Centrilobular emphysema- currently on 02 needs recert- In office today on room air 92 % pt dropped during 6 min ambulation to 88% - remained 88% at recovery after 2 mins back placed on 2 l and sas 95%    Dysuria/BPH- Obtain UA today in the office  Depending on results may refer to urology PSA stable father w/ hx of prostate ca    Memory loss/decreased concentration- Consult neurology for evaluation       tobacco use- still smoking 2-5 cigs/day Obtain low- dose CT screening    Hyperlipidemia- obtain labs in 6 months and follow up in the office    S/p liver transplant following w/ transplant team    Chronic pain/neuropathy- prn medical MJ    Cad Stable without angina continue to modify risk factors    DM2- stable without meds

## 2021-02-05 NOTE — PROGRESS NOTES
Assessment and Plan:     Problem List Items Addressed This Visit     None          Depression Screening and Follow-up Plan: Patient's depression screening was positive with a PHQ-2 score of 6  Their PHQ-9 score was 19  Patient assessed for underlying major depression  Brief counseling provided and recommend additional follow-up/re-evaluation next office visit  Preventive health issues were discussed with patient, and age appropriate screening tests were ordered as noted in patient's After Visit Summary  Personalized health advice and appropriate referrals for health education or preventive services given if needed, as noted in patient's After Visit Summary       History of Present Illness:     Patient presents for Medicare Annual Wellness visit    Patient Care Team:  Rodo Forde MD as PCP - JOSE Montero MD Jed Liming, MD     Problem List:     Patient Active Problem List   Diagnosis    Anxiety    Arteriosclerosis of coronary artery    Chronic obstructive pulmonary disease ([facility]    Esophagitis, reflux    Hyperlipidemia    Hypertension    Immunosuppression ([facility]    Primary osteoarthritis of left knee    Liver transplanted ([facility]    Impaired fasting glucose    Idiopathic peripheral neuropathy    Tobacco abuse    Malignant neoplasm of intrahepatic bile ducts ([facility]    Hepatic fibrosis    Blood clotting disorder ([facility]    Hepatitis C virus carrier state ([facility]    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[facility] )      Past Medical and Surgical History:     Past Medical History:   Diagnosis Date    Abnormal electrocardiogram     Abnormal findings on radiological examination of gastrointestinal tract     Abnormal liver enzymes     Acute hepatitis C     Adrenal disorder (HCC)     Aneurysm of unspecified site ([facility]     Benign neoplasm of skin     Bronchopneumonia     Chronic hepatitis C ([facility]     Chronic obstructive asthma (HCC)     Cirrhosis (HCC)     Colon, diverticulosis     COPD [facility]chronic obstructive [facility] disease) [facility]     Depression     Diabetes mellitus [facility]     Drug dependence, continuous abuse ([facility]     Hyperlipidemia     Hypertension     Laennec's cirrhosis (alcoholic) ([facility]     Malabsorption syndrome     Mitral valve disorder     Murmur     Nonspecific abnormal finding on cardiac evaluation     Peripheral vascular disease (HCC)     Pleural effusion     Pneumonia     Rectal hemorrhage     Renal failure     Respiratory abnormality     Restrictive lung disease     lung disease other not elsewhere class     Testicular dysfunction     testicular hypfunction other     Tricuspid valve disorders, non-rheumatic     Type 2 diabetes mellitus (HCC)     uncomplicated controlled      Ventral hernia      Past Surgical History:   Procedure Laterality Date    CHOLECYSTECTOMY      GALLBLADDER SURGERY      HEPATITIS B SURFACE AB QN,LIVER TRANSPLANT (HISTORICAL)      HERNIA REPAIR      JOINT REPLACEMENT      L knee 3/10/20    KNEE SURGERY Left 03/2020    WOUND DEBRIDEMENT Left 4/10/2020    Procedure: EXCISIONAL DEBRIDEMENT;  Surgeon: Elis Shepard MD;  Location: [facility];  Service: General      Family History:     Family History   Problem Relation Age of Onset    Lung cancer Mother         overlapping sites of lung unspecified laterality     Heart disease Father         cardiac disorder    Arthritis Family     Cancer Family     Liver disease Family         chronic    Coronary artery disease Family     Diabetes Family       Social History:     E-Cigarette/Vaping    E-Cigarette Use Current Some Day User     Comments Vaping      E-Cigarette/Vaping Substances    Nicotine Yes     THC No     CBD No     Flavoring Yes     Other No     Unknown No      Social History     Socioeconomic History    Marital status:      Spouse name: None    Number of children: None    Years of education: None    Highest education level: None   Occupational History    None   Social Needs    Financial resource strain: None    Food insecurity     Worry: None     Inability: None    Transportation needs     Medical: None     Non-medical: None   Tobacco Use    Smoking status: Current Every Day Smoker     Packs/day: 0 25     Years: 40 00     Pack years: 10 00     Types: Cigarettes    Smokeless tobacco: Never Used    Tobacco comment: 5 a day    Substance and Sexual Activity    Alcohol use: No     Comment: no alcohol use     Drug use: Yes     Types: Marijuana     Comment: medical marijuana    Sexual activity: Never   Lifestyle    Physical activity     Days per week: 0 days     Minutes per session: 0 min    Stress: Not at all   Relationships    Social connections     Talks on phone: None     Gets together: None     Attends Mosque service: None     Active member of club or organization: None     Attends meetings of clubs or organizations: None     Relationship status: None    Intimate partner violence     Fear of current or ex partner: None     Emotionally abused: None     Physically abused: None     Forced sexual activity: None   Other Topics Concern    None   Social History Narrative    Disability     Lives with adult children       Medications and Allergies:     Current Outpatient Medications   Medication Sig Dispense Refill    aspirin 81 MG tablet Take 1 tablet by mouth daily      atenolol (TENORMIN) 100 mg tablet TAKE 1 TABLET TWICE A  tablet 3    furosemide (LASIX) 40 mg tablet Take 1 tablet (40 mg total) by mouth daily 90 tablet 3    pantoprazole (PROTONIX) 40 mg tablet TAKE 1 TABLET EVERY DAY 90 tablet 3    potassium chloride (K-DUR,KLOR-CON) 20 mEq tablet Take 1 tablet (20 mEq total) by mouth every other day 90 tablet 3    pravastatin (PRAVACHOL) 20 mg tablet TAKE 1 TABLET BY MOUTH EVERY DAY 90 tablet 3    tacrolimus (PROGRAF) 1 mg capsule Take 2 mg by mouth 2 (two) times a day       Calcium Carb-Cholecalciferol 1000-800 MG-UNIT TABS Take 1 tablet by mouth      ipratropium-albuterol (DUO-NEB) 0 5-2 5 mg/3 mL nebulizer solution Take 1 vial (3 mL total) by nebulization 4 (four) times a day 1080 mL 3     No current facility-administered medications for this visit  Allergies   Allergen Reactions    Dust Mite Extract Cough, Eye Swelling, Itching and Wheezing    Muscle Relief  [Capsaicin] Confusion, Headache, Lightheadedness, Palpitations, Tinnitus and Vomiting    Other      Muscle relaxer's causes vomiting and nausea     Grass Extracts [Gramineae Pollens] Hives and Sneezing    Pollen Extract Hives and Sneezing     Watery Eyes    Prozac [Fluoxetine]       Immunizations:     Immunization History   Administered Date(s) Administered    Hep A, adult 09/13/2002    Hep B, adult 09/13/2002    INFLUENZA 10/17/2011, 11/17/2015, 09/19/2019    Influenza Quadrivalent, 6-35 Months IM 10/21/2014, 11/17/2015    Influenza Split High Dose Preservative Free IM 09/19/2019    Influenza, high dose seasonal 0 7 mL 09/19/2019    Influenza, recombinant, quadrivalent,injectable, preservative free 10/09/2020    Pneumococcal Conjugate 13-Valent 05/02/2015    Pneumococcal Polysaccharide PPV23 09/13/2002, 07/08/2018    Tdap 11/17/2004      Health Maintenance:         Topic Date Due    DXA SCAN  08/07/2022    Colorectal Cancer Screening  05/07/2023    Hepatitis C Screening  Discontinued         Topic Date Due    Hepatitis B Vaccine (2 of 3 - Risk 3-dose series) 10/11/2002    Hepatitis A Vaccine (2 of 2 - Risk 2-dose series) 03/13/2003    DTaP,Tdap,and Td Vaccines (2 - Td) 11/17/2014      Medicare Health Risk Assessment:     /78 (BP Location: Left arm, Patient Position: Sitting, Cuff Size: Standard)   Temp (!) 97 2 °F (36 2 °C)   Ht 6' (1 829 m)   Wt 78 8 kg (173 lb 12 8 oz)   BMI 23 57 kg/m²      Mercedes Lucero is here for his Subsequent Wellness visit  Health Risk Assessment:   Patient rates overall health as fair   Patient feels that their physical health rating is slightly better  Eyesight was rated as same  Hearing was rated as slightly worse  Patient feels that their emotional and mental health rating is same  Pain experienced in the last 7 days has been some  Patient's pain rating has been 8/10  Patient states that he has experienced no weight loss or gain in last 6 months  Depression Screening:   PHQ-2 Score: 6  PHQ-9 Score: 19      Fall Risk Screening: In the past year, patient has experienced: no history of falling in past year      Home Safety:  Patient has trouble with stairs inside or outside of their home  Patient has working smoke alarms and has working carbon monoxide detector  Home safety hazards include: none  Nutrition:   Current diet is Regular and Limited junk food  Medications:   Patient is not currently taking any over-the-counter supplements  Patient is able to manage medications  Activities of Daily Living (ADLs)/Instrumental Activities of Daily Living (IADLs):   Walk and transfer into and out of bed and chair?: Yes  Dress and groom yourself?: Yes    Bathe or shower yourself?: Yes    Feed yourself?  Yes  Do your laundry/housekeeping?: Yes  Manage your money, pay your bills and track your expenses?: Yes  Make your own meals?: Yes    Do your own shopping?: Yes    Previous Hospitalizations:   Any hospitalizations or ED visits within the last 12 months?: Yes    How many hospitalizations have you had in the last year?: 1-2    Advance Care Planning:   Living will: No    Advanced directive: No      Cognitive Screening:   Provider or family/friend/caregiver concerned regarding cognition?: No    PREVENTIVE SCREENINGS      Cardiovascular Screening:    General: Screening Not Indicated and History Lipid Disorder      Diabetes Screening:     General: Screening Current      Colorectal Cancer Screening:     General: Screening Current      Prostate Cancer Screening:    General: Screening Current      Osteoporosis Screening:    General: Screening Not Indicated and History Osteoporosis      Abdominal Aortic Aneurysm (AAA) Screening:    Risk factors include: age between 73-67 yo and tobacco use        Lung Cancer Screening:     General: Screening Not Indicated      Hepatitis C Screening:    General: Screening Not Indicated and History Hepatitis C      JOSE Tran

## 2021-02-09 ENCOUNTER — TELEPHONE (OUTPATIENT)
Dept: CARDIOLOGY CLINIC | Facility: CLINIC | Age: 66
End: 2021-02-09

## 2021-02-09 DIAGNOSIS — I25.10 ATHEROSCLEROSIS OF NATIVE CORONARY ARTERY OF NATIVE HEART WITHOUT ANGINA PECTORIS: ICD-10-CM

## 2021-02-09 DIAGNOSIS — R06.00 DYSPNEA ON EXERTION: ICD-10-CM

## 2021-02-09 RX ORDER — FUROSEMIDE 20 MG/1
20 TABLET ORAL DAILY
Qty: 90 TABLET | Refills: 3 | Status: SHIPPED | OUTPATIENT
Start: 2021-02-09 | End: 2021-07-15 | Stop reason: SDUPTHER

## 2021-02-09 NOTE — TELEPHONE ENCOUNTER
Pt called & said that he is out of Furosemide but does not want to get it refilled until talking with Dr Renetta Berger  He was previously having issues on it so Dr Renetta Berger had him take half a tablet but he still thinks he is having issues with his prostate possibly related to the furosemide  He would like to know if he can just stop taking it or if Dr Renetta Berger wants him to get it refilled?  Please advise

## 2021-02-09 NOTE — TELEPHONE ENCOUNTER
Talked to the patient  Lasix 20 mg ordered he will try taking Lasix 20 mg daily with alternate day potassium and if it still bothers him then he will take Lasix 20 mg every other day    He was advised to take oral potassium every 3rd day if he is taking Lasix every other day

## 2021-02-17 ENCOUNTER — TELEPHONE (OUTPATIENT)
Dept: NEUROLOGY | Facility: CLINIC | Age: 66
End: 2021-02-17

## 2021-02-17 NOTE — TELEPHONE ENCOUNTER
Called and left a message that Dr Jane Hernandez has a virtual video visit opening on 2/18/21 at 9:00 and 10:00

## 2021-02-22 ENCOUNTER — HOSPITAL ENCOUNTER (OUTPATIENT)
Dept: CT IMAGING | Facility: CLINIC | Age: 66
Discharge: HOME | End: 2021-02-22
Payer: MEDICARE

## 2021-02-22 DIAGNOSIS — F17.200 SMOKING: ICD-10-CM

## 2021-02-22 PROCEDURE — 71271 CT THORAX LUNG CANCER SCR C-: CPT

## 2021-02-26 ENCOUNTER — TELEPHONE (OUTPATIENT)
Dept: INTERNAL MEDICINE CLINIC | Facility: CLINIC | Age: 66
End: 2021-02-26

## 2021-02-26 DIAGNOSIS — F17.200 SMOKING: Primary | ICD-10-CM

## 2021-02-26 NOTE — TELEPHONE ENCOUNTER
----- Message from [facility], [address] sent at 2/26/2021  3:47 PM EST -----  Ct chest was stable no lung nodules seen   Repeat 1 year

## 2021-03-07 DIAGNOSIS — K21.00 ESOPHAGITIS, REFLUX: ICD-10-CM

## 2021-03-08 RX ORDER — PANTOPRAZOLE SODIUM 40 MG/1
TABLET, DELAYED RELEASE ORAL
Qty: 90 TABLET | Refills: 3 | Status: SHIPPED | OUTPATIENT
Start: 2021-03-08 | End: 2021-08-09 | Stop reason: SDUPTHER

## 2021-03-10 DIAGNOSIS — Z23 ENCOUNTER FOR IMMUNIZATION: ICD-10-CM

## 2021-03-26 ENCOUNTER — HOSPITAL ENCOUNTER (OUTPATIENT)
Dept: CT IMAGING | Facility: HOSPITAL | Age: 66
Discharge: HOME | End: 2021-03-26

## 2021-03-26 DIAGNOSIS — F17.200 SMOKING: ICD-10-CM

## 2021-03-29 ENCOUNTER — TELEPHONE (OUTPATIENT)
Dept: INTERNAL MEDICINE CLINIC | Facility: CLINIC | Age: 66
End: 2021-03-29

## 2021-03-29 ENCOUNTER — IMMUNIZATIONS (OUTPATIENT)
Dept: FAMILY MEDICINE CLINIC | Facility: HOSPITAL | Age: 66
End: 2021-03-29

## 2021-03-29 DIAGNOSIS — I10 HYPERTENSION, UNSPECIFIED TYPE: ICD-10-CM

## 2021-03-29 DIAGNOSIS — Z23 ENCOUNTER FOR IMMUNIZATION: Primary | ICD-10-CM

## 2021-03-29 PROCEDURE — 91300 SARS-COV-2 / COVID-19 MRNA VACCINE (PFIZER-BIONTECH) 30 MCG: CPT

## 2021-03-29 PROCEDURE — 0001A SARS-COV-2 / COVID-19 MRNA VACCINE (PFIZER-BIONTECH) 30 MCG: CPT

## 2021-03-29 RX ORDER — ATENOLOL 100 MG/1
100 TABLET ORAL 2 TIMES DAILY
Qty: 180 TABLET | Refills: 3 | Status: SHIPPED | OUTPATIENT
Start: 2021-03-29 | End: 2021-04-19 | Stop reason: CLARIF

## 2021-03-29 NOTE — TELEPHONE ENCOUNTER
----- Message from Rosette Dunne sent at 3/29/2021  8:20 AM EDT -----  Regarding: Prescription Question  Contact: [phone]  Good morning Sigrid,I lost my Atenolol haven't taken it in two days can U see if the pharmacy will give me more? ??

## 2021-03-29 NOTE — TELEPHONE ENCOUNTER
You ordered a CT Lung on: 2/26/21    The pt just had one done in Feb of this yr      The CT Lung you just ordered should be done next yr in March 2022    It should be a future pre-cert

## 2021-03-30 RX ORDER — ATENOLOL 100 MG/1
TABLET ORAL
Qty: 180 TABLET | Refills: 3 | Status: SHIPPED | OUTPATIENT
Start: 2021-03-30 | End: 2021-08-09 | Stop reason: SDUPTHER

## 2021-04-15 ENCOUNTER — OFFICE VISIT (OUTPATIENT)
Dept: CARDIOLOGY CLINIC | Facility: CLINIC | Age: 66
End: 2021-04-15
Payer: MEDICARE

## 2021-04-15 VITALS
OXYGEN SATURATION: 95 % | DIASTOLIC BLOOD PRESSURE: 72 MMHG | WEIGHT: 173.2 LBS | HEART RATE: 65 BPM | SYSTOLIC BLOOD PRESSURE: 122 MMHG | BODY MASS INDEX: 23.46 KG/M2 | HEIGHT: 72 IN

## 2021-04-15 DIAGNOSIS — F17.200 SMOKER: ICD-10-CM

## 2021-04-15 DIAGNOSIS — I10 ESSENTIAL HYPERTENSION: ICD-10-CM

## 2021-04-15 DIAGNOSIS — I50.32 CHRONIC DIASTOLIC HEART FAILURE (HCC): ICD-10-CM

## 2021-04-15 DIAGNOSIS — I27.20 PULMONARY HYPERTENSION (HCC): ICD-10-CM

## 2021-04-15 DIAGNOSIS — E78.2 MIXED HYPERLIPIDEMIA: ICD-10-CM

## 2021-04-15 DIAGNOSIS — I25.10 ATHEROSCLEROSIS OF NATIVE CORONARY ARTERY OF NATIVE HEART WITHOUT ANGINA PECTORIS: Primary | ICD-10-CM

## 2021-04-15 PROCEDURE — 99214 OFFICE O/P EST MOD 30 MIN: CPT | Performed by: INTERNAL MEDICINE

## 2021-04-15 NOTE — PROGRESS NOTES
[facility]  [facility] [address] [id number]  Cardiology Follow Up    Soraya Villalba  1955  [id number]      1  Atherosclerosis of native coronary artery of native heart without angina pectoris     2  Chronic diastolic heart failure ([address] )     3  Essential hypertension     4  Mixed hyperlipidemia     5  Pulmonary hypertension ([facility] )     6  Smoker         Chief Complaint   Patient presents with    Follow-up     Routine follow up       Interval History:   29-year-old male with nonobstructive coronaries( left circumflex 50% stenosis in 2015), hypertension, hyperlipidemia, active smoker, COPD on p r n  oxygen, mild-to-moderate pulmonary hypertension, hepatitis-C/ liver transplant in 2004 presented for cardiology follow-up     patient has chronic shortness of breath on exertion  His echo showed low normal LVEF without regional wall motion abnormality and Lexiscan MPI stress test was negative for inducible ischemia in February 2020  He was started on oral Lasix on last clinic visit last year  Patient felt much better after starting oral Lasix 40 mg    But due to bladder uncomfortable  it was reduced to 20 mg    since last visit patient is doing okay his shortness of breath on exertion is better he can do up and down status 3 4 times a day without much shortness of breath   Denies chest pain, palpitation, orthopnea, leg edema, paroxysmal nocturnal dyspnea or loss of consciousness   He was recently diagnosed to have leg neuropathy and is following with Neurology     He has appointment with Urology for his prostate        Current medications reviewed   Labs from February 2021 reviewed   Creatinine 1 12, ( was 80), HbA1c 5 7, TSH 1 2    Review of Systems:   all review of system negative except as mentioned above    Patient Active Problem List   Diagnosis    Anxiety    Arteriosclerosis of coronary artery    Chronic obstructive pulmonary disease (HCC)    Esophagitis, reflux    Hyperlipidemia    Hypertension    Immunosuppression (HCC)    Primary osteoarthritis of left knee    Liver transplanted ([place] [address] )    Impaired fasting glucose    Idiopathic peripheral neuropathy    Tobacco abuse    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)    Hepatic fibrosis    Blood clotting disorder (HCC)    Hepatitis C virus carrier state ([place] [address] )    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HCC)     Past Medical History:   Diagnosis Date    Abnormal electrocardiogram     Abnormal findings on radiological examination of gastrointestinal tract     Abnormal liver enzymes     Acute hepatitis C     Adrenal disorder (HCC)     Aneurysm of unspecified site ([place] [address] )     Benign neoplasm of skin     Bronchopneumonia     Chronic hepatitis C (HCC)     Chronic obstructive asthma (HCC)     Cirrhosis (HCC)     Colon, diverticulosis     COPD (chronic obstructive pulmonary disease) (HCC)     Depression     Diabetes mellitus ([place] [address] )     Drug dependence, continuous abuse ([place] [address] )     Hyperlipidemia     Hypertension     Laennec's cirrhosis (alcoholic) ([place] [address] )     Malabsorption syndrome     Mitral valve disorder     Murmur     Nonspecific abnormal finding on cardiac evaluation     Peripheral vascular disease (HCC)     Pleural effusion     Pneumonia     Rectal hemorrhage     Renal failure     Respiratory abnormality     Restrictive lung disease     lung disease other not elsewhere class     Testicular dysfunction     testicular hypfunction other     Tricuspid valve disorders, non-rheumatic     Type 2 diabetes mellitus (HCC)     uncomplicated controlled      Ventral hernia      Social History     Socioeconomic History    Marital status:      Spouse name: Not on file    Number of children: Not on file    Years of education: Not on file    Highest education level: Not on file   Occupational History    Not on file   Social Needs    Financial resource strain: Not on file    Food insecurity     Worry: Not on file     Inability: Not on file    Transportation needs     Medical: Not on file     Non-medical: Not on file   Tobacco Use    Smoking status: Current Every Day Smoker     Packs/day: 0 25     Years: 40 00     Pack years: 10 00     Types: Cigarettes    Smokeless tobacco: Never Used    Tobacco comment: 5 a day    Substance and Sexual Activity    Alcohol use: No     Comment: no alcohol use     Drug use: Yes     Types: Marijuana     Comment: medical marijuana    Sexual activity: Never   Lifestyle    Physical activity     Days per week: 0 days     Minutes per session: 0 min    Stress: Not at all   Relationships    Social connections     Talks on phone: Not on file     Gets together: Not on file     Attends Orthodox service: Not on file     Active member of club or organization: Not on file     Attends meetings of clubs or organizations: Not on file     Relationship status: Not on file    Intimate partner violence     Fear of current or ex partner: Not on file     Emotionally abused: Not on file     Physically abused: Not on file     Forced sexual activity: Not on file   Other Topics Concern    Not on file   Social History Narrative    Disability     Lives with adult children       Family History   Problem Relation Age of Onset    Lung cancer Mother         overlapping sites of lung unspecified laterality     Heart disease Father         cardiac disorder    Arthritis Family     Cancer Family     Liver disease Family         chronic    Coronary artery disease Family     Diabetes Family      Past Surgical History:   Procedure Laterality Date    CHOLECYSTECTOMY      GALLBLADDER SURGERY      HEPATITIS B SURFACE AB QN,LIVER TRANSPLANT (HISTORICAL)      HERNIA REPAIR      JOINT REPLACEMENT      L knee 3/10/20    KNEE SURGERY Left 03/2020    WOUND DEBRIDEMENT Left 4/10/2020    Procedure: EXCISIONAL DEBRIDEMENT;  Surgeon: Ernesto Cueto MD;  [facility];  Service: General Current Outpatient Medications:     aspirin 81 MG tablet, Take 1 tablet by mouth daily, Disp: , Rfl:     atenolol (TENORMIN) 100 mg tablet, Take 1 tablet (100 mg total) by mouth 2 (two) times a day, Disp: 180 tablet, Rfl: 3    furosemide (LASIX) 20 mg tablet, Take 1 tablet (20 mg total) by mouth daily, Disp: 90 tablet, Rfl: 3    pantoprazole (PROTONIX) 40 mg tablet, TAKE 1 TABLET BY MOUTH EVERY DAY, Disp: 90 tablet, Rfl: 3    potassium chloride (K-DUR,KLOR-CON) 20 mEq tablet, Take 1 tablet (20 mEq total) by mouth every other day, Disp: 90 tablet, Rfl: 3    pravastatin (PRAVACHOL) 20 mg tablet, TAKE 1 TABLET BY MOUTH EVERY DAY, Disp: 90 tablet, Rfl: 3    tacrolimus (PROGRAF) 1 mg capsule, Take 2 mg by mouth 2 (two) times a day , Disp: , Rfl:     tamsulosin (FLOMAX) 0 4 mg, Take 1 capsule (0 4 mg total) by mouth daily with dinner, Disp: 30 capsule, Rfl: 5    atenolol (TENORMIN) 100 mg tablet, TAKE 1 TABLET BY MOUTH TWICE A DAY, Disp: 180 tablet, Rfl: 3    Calcium Carb-Cholecalciferol 1000-800 MG-UNIT TABS, Take 1 tablet by mouth, Disp: , Rfl:     ipratropium-albuterol (DUO-NEB) 0 5-2 5 mg/3 mL nebulizer solution, Take 1 vial (3 mL total) by nebulization 4 (four) times a day (Patient not taking: Reported on 4/15/2021), Disp: 1080 mL, Rfl: 3  Allergies   Allergen Reactions    Dust Mite Extract Cough, Eye Swelling, Itching and Wheezing    Muscle Relief  [Capsaicin] Confusion, Headache, Lightheadedness, Palpitations, Tinnitus and Vomiting    Other      Muscle relaxer's causes vomiting and nausea     Grass Extracts [Gramineae Pollens] Hives and Sneezing    Pollen Extract Hives and Sneezing     Watery Eyes    Prozac [Fluoxetine]        Labs:  Office Visit on 02/05/2021   Component Date Value    Color, UA 02/05/2021 Dk Yellow     Clarity, UA 02/05/2021 Clear     Specific Gravity, UA 02/05/2021 1 024     pH, UA 02/05/2021 6 5     Leukocytes, UA 02/05/2021 Negative     Nitrite, UA 02/05/2021 Negative     Protein, UA 02/05/2021 100 (2+)*    Glucose, UA 02/05/2021 Negative     Ketones, UA 02/05/2021 Negative     Urobilinogen, UA 02/05/2021 1 0     Bilirubin, UA 02/05/2021 Negative     Blood, UA 02/05/2021 Negative     RBC, UA 02/05/2021 None Seen     WBC, UA 02/05/2021 None Seen     Epithelial Cells 02/05/2021 None Seen     Bacteria, UA 02/05/2021 None Seen     Hyaline Casts, UA 02/05/2021 None Seen    Lab on 02/03/2021   Component Date Value    WBC 02/03/2021 5 12     RBC 02/03/2021 4 40     Hemoglobin 02/03/2021 14 4     Hematocrit 02/03/2021 43 4     MCV 02/03/2021 99*    MCH 02/03/2021 32 7     MCHC 02/03/2021 33 2     RDW 02/03/2021 13 1     MPV 02/03/2021 10 1     Platelets 67/80/4462 199     nRBC 02/03/2021 0     Neutrophils Relative 02/03/2021 58     Immat GRANS % 02/03/2021 0     Lymphocytes Relative 02/03/2021 32     Monocytes Relative 02/03/2021 7     Eosinophils Relative 02/03/2021 2     Basophils Relative 02/03/2021 1     Neutrophils Absolute 02/03/2021 2 97     Immature Grans Absolute 02/03/2021 0 02     Lymphocytes Absolute 02/03/2021 1 66     Monocytes Absolute 02/03/2021 0 34     Eosinophils Absolute 02/03/2021 0 08     Basophils Absolute 02/03/2021 0 05     Sodium 02/03/2021 132*    Potassium 02/03/2021 4 5     Chloride 02/03/2021 100     CO2 02/03/2021 29     ANION GAP 02/03/2021 3*    BUN 02/03/2021 12     Creatinine 02/03/2021 1 12     Glucose, Fasting 02/03/2021 96     Calcium 02/03/2021 9 5     AST 02/03/2021 18     ALT 02/03/2021 19     Alkaline Phosphatase 02/03/2021 60     Total Protein 02/03/2021 7 8     Albumin 02/03/2021 4 1     Total Bilirubin 02/03/2021 1 24*    eGFR 02/03/2021 67     Cholesterol 02/03/2021 189     Triglycerides 02/03/2021 101     HDL, Direct 02/03/2021 59     LDL Calculated 02/03/2021 110*    Non-HDL-Chol (CHOL-HDL) 02/03/2021 130     Hemoglobin A1C 02/03/2021 5 7*    EAG 02/03/2021 117     TSH 3RD GENERATON 02/03/2021 1 210     PSA 02/03/2021 0 5     Magnesium 02/03/2021 1 8     Protime 02/03/2021 13 8     INR 02/03/2021 1 06     GGT 02/03/2021 29     TACROLIMUS 02/03/2021 6 2      Imaging: No results found  Physical Exam:  General:  average built, awake, alert and oriented x3, not in distress  Neck: supple, no JVD  Eyes: PERRL, conjunctiva normal  Lungs:   Mildly put over Bilateral air entry positive, no wheeze/rhonchi or crackle  Heart:  S1-S2 normal, no murmur  Abdomen:  Soft ,nondistended ,nontender, bowel sounds positive  Extremities:  No leg edema, no deformity, ROM normal  Neuro:  Moving all extremities, speech clear  Skin: warm, no rash  /72 (BP Location: Left arm, Patient Position: Sitting, Cuff Size: Standard)   Pulse 65   Ht 6' (1 829 m)   Wt 78 6 kg (173 lb 3 2 oz)   SpO2 95%   BMI 23 49 kg/m²       Assessment:    1  Dyspnea on exertion   improved since last clinic visit  As per patient 40 Lasix was definitely helping a lot  His exercise tolerance has improved   likely due to underlying COPD plus  Diastolic heart failure and pulmonary hypertension    2  Diastolic heart failure   Compensated at present time    3  Nonobstructive coronary artery disease   left circumflex 50% stenosis in 2015  Lexiscan MPI stress test negative for inducible ischemia in February 2020    5  Hypertension   Controlled    6  Hyperlipidemia  Continue pravastatin and needs repeat lipid panel in 6 months  7  Active smoker  Patient strongly advised to quit smoking  8  Mild-to-moderate pulmonary hypertension likely due to underlying COPD  9  COPD  10  Hepatitis-C/ liver transplant in 2004    Recommendations:     patient is doing okay from cardiology standpoint  His shortness of breath on exertion has improved after Lasix  He was advised to take Lasix 20 mg twice a day and see whether he can tolerate it    To continue aspirin, atenolol, pravastatin and oral potassium      advised to follow-up in pulmonary clinic  Patient strongly advised to quit smoking and take low-salt, low-fat /low-cholesterol diet     Return to clinic in 6 months or early if needed   Above all discussed with patient    Patient understands and agrees

## 2021-04-19 ENCOUNTER — TELEMEDICINE (OUTPATIENT)
Dept: INTERNAL MEDICINE CLINIC | Facility: CLINIC | Age: 66
End: 2021-04-19
Payer: MEDICARE

## 2021-04-19 ENCOUNTER — TELEPHONE (OUTPATIENT)
Dept: PULMONOLOGY | Facility: CLINIC | Age: 66
End: 2021-04-19

## 2021-04-19 ENCOUNTER — IMMUNIZATIONS (OUTPATIENT)
Dept: FAMILY MEDICINE CLINIC | Facility: HOSPITAL | Age: 66
End: 2021-04-19

## 2021-04-19 DIAGNOSIS — J30.9 ALLERGIC SINUSITIS: Primary | ICD-10-CM

## 2021-04-19 DIAGNOSIS — Z23 ENCOUNTER FOR IMMUNIZATION: Primary | ICD-10-CM

## 2021-04-19 PROCEDURE — 91300 SARS-COV-2 / COVID-19 MRNA VACCINE (PFIZER-BIONTECH) 30 MCG: CPT

## 2021-04-19 PROCEDURE — 0002A SARS-COV-2 / COVID-19 MRNA VACCINE (PFIZER-BIONTECH) 30 MCG: CPT

## 2021-04-19 PROCEDURE — 99213 OFFICE O/P EST LOW 20 MIN: CPT | Performed by: NURSE PRACTITIONER

## 2021-04-19 RX ORDER — PREDNISONE 10 MG/1
TABLET ORAL
Qty: 18 TABLET | Refills: 0 | Status: SHIPPED | OUTPATIENT
Start: 2021-04-19 | End: 2021-07-15 | Stop reason: CLARIF

## 2021-04-19 NOTE — TELEPHONE ENCOUNTER
[facility] called asking if we can fax them a order for nebulizer supplies be faxed to them at [phone]  They state patient walked in and they gave to him not remembering they still need a order from us  Can you please place and I send to them  Thank you

## 2021-04-19 NOTE — PROGRESS NOTES
Virtual Regular Visit      Assessment/Plan:   allergic rhinitis, he states for some reason he was told he could not take regular over-the-counter antihistamines, generally will take prednisone when they get this bad  Will treat with prednisone  Risk benefits side effects discussed with patient  Problem List Items Addressed This Visit     None      Visit Diagnoses     Allergic sinusitis    -  Primary    Relevant Medications    predniSONE 10 mg tablet               Reason for visit is No chief complaint on file  Encounter provider JOSE Card    Provider located at [address] [place] [address]      Recent Visits  No visits were found meeting these conditions  Showing recent visits within past 7 days and meeting all other requirements     Today's Visits  Date Type Provider Dept   04/19/21 Telemedicine Leona Catherine, [facility] today's visits and meeting all other requirements     Future Appointments  No visits were found meeting these conditions  Showing future appointments within next 150 days and meeting all other requirements        The patient was identified by name and date of birth  Vandana Meadows was informed that this is a telemedicine visit and that the visit is being conducted through [address] [place] [address] and patient was informed that this is not a secure, HIPAA-compliant platform  He agrees to proceed     My office door was closed  No one else was in the room  He acknowledged consent and understanding of privacy and security of the video platform  The patient has agreed to participate and understands they can discontinue the visit at any time  Patient is aware this is a billable service  Subjective  Vandana Meadows is a 74 y o  male  Patient has been having severe allergy symptoms for about 2 weeks now    Predominantly runny nose postnasal drip occasional cough no shortness of breath fevers no other COVID like symptoms  These allergies are pretty typical   He was told not to take over-the-counter antihistamines something to do with his liver he is not quite sure         HPI     Past Medical History:   Diagnosis Date    Abnormal electrocardiogram     Abnormal findings on radiological examination of gastrointestinal tract     Abnormal liver enzymes     Acute hepatitis C     Adrenal disorder (HCC)     Aneurysm of unspecified site ([facility] )     Benign neoplasm of skin     Bronchopneumonia     Chronic hepatitis C (HCC)     Chronic obstructive asthma (HCC)     Cirrhosis (HCC)     Colon, diverticulosis     COPD (chronic obstructive pulmonary disease) (HCC)     Depression     Diabetes mellitus (HCC)     Drug dependence, continuous abuse ([facility] )     Hyperlipidemia     Hypertension     Laennec's cirrhosis (alcoholic) (HCC)     Malabsorption syndrome     Mitral valve disorder     Murmur     Nonspecific abnormal finding on cardiac evaluation     Peripheral vascular disease (HCC)     Pleural effusion     Pneumonia     Rectal hemorrhage     Renal failure     Respiratory abnormality     Restrictive lung disease     lung disease other not elsewhere class     Testicular dysfunction     testicular hypfunction other     Tricuspid valve disorders, non-rheumatic     Type 2 diabetes mellitus (HCC)     uncomplicated controlled      Ventral hernia        Past Surgical History:   Procedure Laterality Date    CHOLECYSTECTOMY      GALLBLADDER SURGERY      HEPATITIS B SURFACE AB QN,LIVER TRANSPLANT (HISTORICAL)      HERNIA REPAIR      JOINT REPLACEMENT      L knee 3/10/20    KNEE SURGERY Left 03/2020    WOUND DEBRIDEMENT Left 4/10/2020    Procedure: EXCISIONAL DEBRIDEMENT;  Surgeon: Trisha Shelton MD;  Location: [facility];  Service: General       Current Outpatient Medications   Medication Sig Dispense Refill    aspirin 81 MG tablet Take 1 tablet by mouth daily      atenolol (TENORMIN) 100 mg tablet TAKE 1 TABLET BY MOUTH TWICE A  tablet 3    Calcium Carb-Cholecalciferol 1000-800 MG-UNIT TABS Take 1 tablet by mouth      furosemide (LASIX) 20 mg tablet Take 1 tablet (20 mg total) by mouth daily 90 tablet 3    pantoprazole (PROTONIX) 40 mg tablet TAKE 1 TABLET BY MOUTH EVERY DAY 90 tablet 3    potassium chloride (K-DUR,KLOR-CON) 20 mEq tablet Take 1 tablet (20 mEq total) by mouth every other day 90 tablet 3    pravastatin (PRAVACHOL) 20 mg tablet TAKE 1 TABLET BY MOUTH EVERY DAY 90 tablet 3    predniSONE 10 mg tablet Take 3 tablets for 3 days then 2 tablets for 3 days then 1 tablet for 3 days 18 tablet 0    tacrolimus (PROGRAF) 1 mg capsule Take 2 mg by mouth 2 (two) times a day       tamsulosin (FLOMAX) 0 4 mg Take 1 capsule (0 4 mg total) by mouth daily with dinner 30 capsule 5     No current facility-administered medications for this visit  Allergies   Allergen Reactions    Dust Mite Extract Cough, Eye Swelling, Itching and Wheezing    Muscle Relief  [Capsaicin] Confusion, Headache, Lightheadedness, Palpitations, Tinnitus and Vomiting    Other      Muscle relaxer's causes vomiting and nausea     Grass Extracts [Gramineae Pollens] Hives and Sneezing    Pollen Extract Hives and Sneezing     Watery Eyes    Prozac [Fluoxetine]        Review of Systems   Constitutional: Negative for appetite change, chills, diaphoresis, fatigue, fever and unexpected weight change  HENT: Positive for postnasal drip, rhinorrhea and sneezing  Eyes: Negative for visual disturbance  Respiratory: Negative for chest tightness and shortness of breath  Cardiovascular: Negative for chest pain, palpitations and leg swelling  Gastrointestinal: Negative for abdominal pain and blood in stool  Endocrine: Negative for cold intolerance, heat intolerance, polydipsia, polyphagia and polyuria  Genitourinary: Negative for difficulty urinating, dysuria, frequency and urgency  Musculoskeletal: Negative for arthralgias and myalgias  Skin: Negative for rash and wound  Neurological: Negative for dizziness, weakness, light-headedness and headaches  Hematological: Negative for adenopathy  Psychiatric/Behavioral: Negative for confusion, dysphoric mood and sleep disturbance  The patient is not nervous/anxious  Video Exam    There were no vitals filed for this visit  Physical Exam  Constitutional:       Appearance: He is well-developed  Neck:      Musculoskeletal: Normal range of motion  Pulmonary:      Effort: Pulmonary effort is normal    Neurological:      Mental Status: He is alert  Psychiatric:         Behavior: Behavior is cooperative  I spent 10 minutes directly with the patient during this visit      [address] [place] [address] acknowledges that he has consented to an online visit or consultation  He understands that the online visit is based solely on information provided by him, and that, in the absence of a face-to-face physical evaluation by the physician, the diagnosis he receives is both limited and provisional in terms of accuracy and completeness  This is not intended to replace a full medical face-to-face evaluation by the physician  Amada Huang understands and accepts these terms

## 2021-04-20 ENCOUNTER — TELEPHONE (OUTPATIENT)
Dept: PULMONOLOGY | Facility: CLINIC | Age: 66
End: 2021-04-20

## 2021-04-20 DIAGNOSIS — J43.8 OTHER EMPHYSEMA (HCC): Primary | ICD-10-CM

## 2021-04-20 NOTE — TELEPHONE ENCOUNTER
Zoran Mckoy from [facility] needs a order faxed to [phone] for nebulizer supplies  Can you please place order and I will send to her  Thank you

## 2021-04-26 NOTE — TELEPHONE ENCOUNTER
Called [facility] and started an initial key for the patient through covermymeds over the phone  Covermymeds gave a message of [facility] is not able to process this request through [address] [place] [address], please contact the plan at [phone] or fax in request to [phone]    Paperwork faxed to [phone]

## 2021-04-29 ENCOUNTER — OFFICE VISIT (OUTPATIENT)
Dept: UROLOGY | Facility: CLINIC | Age: 66
End: 2021-04-29
Payer: MEDICARE

## 2021-04-29 VITALS
SYSTOLIC BLOOD PRESSURE: 130 MMHG | WEIGHT: 177 LBS | BODY MASS INDEX: 23.98 KG/M2 | HEIGHT: 72 IN | DIASTOLIC BLOOD PRESSURE: 80 MMHG | HEART RATE: 80 BPM

## 2021-04-29 DIAGNOSIS — C22.1 MALIGNANT NEOPLASM OF INTRAHEPATIC BILE DUCTS (HCC): ICD-10-CM

## 2021-04-29 DIAGNOSIS — Z80.42 FAMILY HISTORY OF PROSTATE CANCER: ICD-10-CM

## 2021-04-29 DIAGNOSIS — Z12.5 SCREENING FOR PROSTATE CANCER: ICD-10-CM

## 2021-04-29 DIAGNOSIS — N40.1 BENIGN PROSTATIC HYPERPLASIA WITH LOWER URINARY TRACT SYMPTOMS, SYMPTOM DETAILS UNSPECIFIED: Primary | ICD-10-CM

## 2021-04-29 DIAGNOSIS — N52.9 ED (ERECTILE DYSFUNCTION) OF ORGANIC ORIGIN: ICD-10-CM

## 2021-04-29 LAB — POST-VOID RESIDUAL VOLUME, ML POC: 0 ML

## 2021-04-29 PROCEDURE — 51798 US URINE CAPACITY MEASURE: CPT | Performed by: UROLOGY

## 2021-04-29 PROCEDURE — 99204 OFFICE O/P NEW MOD 45 MIN: CPT | Performed by: UROLOGY

## 2021-04-29 RX ORDER — SILDENAFIL CITRATE 20 MG/1
20 TABLET ORAL AS NEEDED
Qty: 30 TABLET | Refills: 11 | Status: SHIPPED | OUTPATIENT
Start: 2021-04-29 | End: 2021-08-16 | Stop reason: CLARIF

## 2021-04-29 NOTE — PROGRESS NOTES
Referring Physician: Maribel Flower MD  A copy of this note was sent to the referring physician  Diagnoses and all orders for this visit:    Benign prostatic hyperplasia with lower urinary tract symptoms, symptom details unspecified  -     Ambulatory referral to Urology  -     POCT Measure PVR    Malignant neoplasm of intrahepatic bile ducts ([facility] [address] )  -     POCT Measure PVR    Screening for prostate cancer    Family history of prostate cancer  -     Ambulatory referral to Urology    ED (erectile dysfunction) of organic origin  -     sildenafil (REVATIO) 20 mg tablet; Take 1 tablet (20 mg total) by mouth as needed (2-5 pills as needed 1 hour before use) Take 2-4 tablets as needed            Assessment and plan:       1  Obstructive and occasionally painful lower urinary tract symptoms  - improved with tamsulosin  - discussed cystoscopy and transrectal ultrasound should his symptoms worsen, patient has elected for continue medical management for now which is very reasonable    2  Prostate cancer screening  - negative rectal exam, PSA 0 5  -  Familial history of prostate cancer in father     3  Erectile dysfunction      Today, we had a long and candid discussion about the multifactorial etiology of erectile dysfunction and the stepwise approach to treatment  I discussed the advantages and disadvantages of the standard treatments for erectile dysfunction including phosphodiesterase inhibitors, intracavernosal injections, urethral suppositories, vacuum erection devices, and penile implants  The patient is interested in trying oral pharmacotherapy to start  I discussed the potential side affects of these medications including, but not limited to flushing, headaches, visual changes, nasal congestion, interactions with other medications, prolonged erections, and failure to achieve an erection  He was warned never to take nitroglycerin in conjunction with medications for erectile dysfunction   I also recommended that he stagger when he takes alpha-blockers for BPH and phosphodiesterase inhibitors  If the patient fails multiple trials of the maximum strength dose, we will further discuss alternative treatment options  All of his questions were answered today and he understands the logic of this approach  sildenafil was prescribed  Dosing adverse effects and dose escalation was discussed  Patient will follow up with Urology on an as-needed basis  He knows that if his   Urinary symptoms worsen we can schedule him for cystoscopy and transrectal ultrasound for consideration of additional procedures  Nathen Soriano MD      Chief Complaint      urinary troubles, ED, prostate cancer screening    History of Present Illness     Jimena Romo is a 74 y o  Male referred for a variety of urologic issues  Patient has a familial history of prostate cancer in his father  He has been undergoing prostate cancer screening  His most recent PSA is very low at 0 5  Patient also has some bothersome and occasionally painful lower urinary tract symptoms  He does need to strain on occasion  He does not have significant nocturia  He also notes occasional urinary frequency which is attributed to his diuretic use  He was appropriately initiated on tamsulosin by his primary care physician team and his symptoms have improved significantly  Patient also wishes to discuss some difficulty maintaining erections sufficient for intercourse  He does note  Retrograde ejaculation due to was in which we discussed today  Pertinent medical comorbidities include history of liver transplant and COPD and neuropathy    Detailed Urologic History     - please refer to HPI    Review of Systems     Review of Systems   Constitutional: Negative for activity change and fatigue  HENT: Negative for congestion  Eyes: Negative for visual disturbance     Respiratory: Negative for shortness of breath and wheezing  Cardiovascular: Negative for chest pain and leg swelling  Gastrointestinal: Negative for abdominal pain  Endocrine: Positive for polyuria  Genitourinary: Positive for dysuria  Negative for flank pain, hematuria and urgency  Musculoskeletal: Negative for back pain  Allergic/Immunologic: Negative for immunocompromised state  Neurological: Negative for dizziness and numbness  Psychiatric/Behavioral: Negative for dysphoric mood  All other systems reviewed and are negative  Allergies     Allergies   Allergen Reactions    Dust Mite Extract Cough, Eye Swelling, Itching and Wheezing    Muscle Relief  [Capsaicin] Confusion, Headache, Lightheadedness, Palpitations, Tinnitus and Vomiting    Other      Muscle relaxer's causes vomiting and nausea     Grass Extracts [Gramineae Pollens] Hives and Sneezing    Pollen Extract Hives and Sneezing     Watery Eyes    Prozac [Fluoxetine]        Physical Exam     Physical Exam  Constitutional:       General: He is not in acute distress  Appearance: He is well-developed  HENT:      Head: Normocephalic and atraumatic  Neck:      Musculoskeletal: Normal range of motion  Cardiovascular:      Comments:  Negative lower extremity edema  Pulmonary:      Effort: Pulmonary effort is normal       Breath sounds: Normal breath sounds  Abdominal:      Palpations: Abdomen is soft  Comments: Chevron incision   Genitourinary:     Prostate: Normal    Musculoskeletal: Normal range of motion  Skin:     General: Skin is warm  Neurological:      Mental Status: He is alert and oriented to person, place, and time     Psychiatric:         Behavior: Behavior normal              Vital Signs  Vitals:    04/29/21 1003   BP: 130/80   Pulse: 80   Weight: 80 3 kg (177 lb)   Height: 6' (1 829 m)         Current Medications       Current Outpatient Medications:     aspirin 81 MG tablet, Take 1 tablet by mouth daily, Disp: , Rfl:     atenolol (TENORMIN) 100 mg tablet, TAKE 1 TABLET BY MOUTH TWICE A DAY, Disp: 180 tablet, Rfl: 3    Calcium Carb-Cholecalciferol 1000-800 MG-UNIT TABS, Take 1 tablet by mouth, Disp: , Rfl:     furosemide (LASIX) 20 mg tablet, Take 1 tablet (20 mg total) by mouth daily, Disp: 90 tablet, Rfl: 3    pantoprazole (PROTONIX) 40 mg tablet, TAKE 1 TABLET BY MOUTH EVERY DAY, Disp: 90 tablet, Rfl: 3    potassium chloride (K-DUR,KLOR-CON) 20 mEq tablet, Take 1 tablet (20 mEq total) by mouth every other day, Disp: 90 tablet, Rfl: 3    pravastatin (PRAVACHOL) 20 mg tablet, TAKE 1 TABLET BY MOUTH EVERY DAY, Disp: 90 tablet, Rfl: 3    predniSONE 10 mg tablet, Take 3 tablets for 3 days then 2 tablets for 3 days then 1 tablet for 3 days, Disp: 18 tablet, Rfl: 0    tacrolimus (PROGRAF) 1 mg capsule, Take 2 mg by mouth 2 (two) times a day , Disp: , Rfl:     tamsulosin (FLOMAX) 0 4 mg, Take 1 capsule (0 4 mg total) by mouth daily with dinner, Disp: 30 capsule, Rfl: 5    sildenafil (REVATIO) 20 mg tablet, Take 1 tablet (20 mg total) by mouth as needed (2-5 pills as needed 1 hour before use) Take 2-4 tablets as needed, Disp: 30 tablet, Rfl: 11      Active Problems     Patient Active Problem List   Diagnosis    Anxiety    Arteriosclerosis of coronary artery    Chronic obstructive pulmonary disease (HCC)    Esophagitis, reflux    Hyperlipidemia    Hypertension    Immunosuppression (HCC)    Primary osteoarthritis of left knee    Liver transplanted (HCC)    Impaired fasting glucose    Idiopathic peripheral neuropathy    Tobacco abuse    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)    Hepatic fibrosis    Blood clotting disorder (HCC)    Hepatitis C virus carrier state ([facility] [address] )    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[facility] [address] )    Screening for prostate cancer         Past Medical History     Past Medical History:   Diagnosis Date    Abnormal electrocardiogram     Abnormal findings on radiological examination of gastrointestinal tract     Abnormal liver enzymes  Acute hepatitis C     Adrenal disorder (HCC)     [facility]     Benign neoplasm of skin     Bronchopneumonia     Chronic hepatitis C (HCC)     Chronic obstructive asthma (HCC)     Cirrhosis (HCC)     Colon, diverticulosis     COPD [facility]chronic obstructive pulmonary disease) [facility]     Depression     Diabetes mellitus ([facility]     Drug dependence, continuous abuse ([place] [address] )     Hyperlipidemia     Hypertension     Laennec's cirrhosis (alcoholic) ([place] [address] )     Malabsorption syndrome     Mitral valve disorder     Murmur     Nonspecific abnormal finding on cardiac evaluation     Peripheral vascular disease (HCC)     Pleural effusion     Pneumonia     Rectal hemorrhage     Renal failure     Respiratory abnormality     Restrictive lung disease     lung disease other not elsewhere class     Testicular dysfunction     testicular hypfunction other     Tricuspid valve disorders, non-rheumatic     Type 2 diabetes mellitus (HCC)     uncomplicated controlled      Ventral hernia          Surgical History     Past Surgical History:   Procedure Laterality Date    CHOLECYSTECTOMY      GALLBLADDER SURGERY      HEPATITIS B SURFACE AB QN,LIVER TRANSPLANT (HISTORICAL)      HERNIA REPAIR      JOINT REPLACEMENT      L knee 3/10/20    KNEE SURGERY Left 03/2020    WOUND DEBRIDEMENT Left 4/10/2020    Procedure: EXCISIONAL DEBRIDEMENT;  Surgeon: Percy Mohan MD;  Location: [facility];  Service: General         Family History     Family History   Problem Relation Age of Onset    Lung cancer Mother         overlapping sites of lung unspecified laterality     Heart disease Father         cardiac disorder    Arthritis Family     Cancer Family     Liver disease Family         chronic    Coronary artery disease Family     Diabetes Family          Social History     Social History     Social History     Tobacco Use   Smoking Status Current Every Day Smoker    Packs/day: 0 25    Years: 40 00    Pack years: 10 00    Types: Cigarettes   Smokeless Tobacco Never Used   Tobacco Comment    5 a day          Pertinent Lab Values     Lab Results   Component Value Date    CREATININE 1 12 02/03/2021       Lab Results   Component Value Date    PSA 0 5 02/03/2021    PSA 0 2 09/03/2019       @RESULTRCNT(1H])@      Pertinent Imaging      - n/a    Portions of the record may have been created with voice recognition software   Occasional wrong word or "sound a like" substitutions may have occurred due to the inherent limitations of voice recognition software   Read the chart carefully and recognize, using context, where substitutions have occurred

## 2021-05-04 ENCOUNTER — CONSULT (OUTPATIENT)
Dept: NEUROLOGY | Facility: CLINIC | Age: 66
End: 2021-05-04
Payer: MEDICARE

## 2021-05-04 ENCOUNTER — APPOINTMENT (OUTPATIENT)
Dept: LAB | Facility: CLINIC | Age: 66
End: 2021-05-04
Payer: MEDICARE

## 2021-05-04 VITALS
WEIGHT: 170 LBS | BODY MASS INDEX: 23.03 KG/M2 | HEIGHT: 72 IN | HEART RATE: 56 BPM | SYSTOLIC BLOOD PRESSURE: 152 MMHG | DIASTOLIC BLOOD PRESSURE: 90 MMHG

## 2021-05-04 DIAGNOSIS — G62.9 PERIPHERAL NEUROPATHY: ICD-10-CM

## 2021-05-04 DIAGNOSIS — R41.3 MEMORY LOSS: ICD-10-CM

## 2021-05-04 DIAGNOSIS — G62.9 PERIPHERAL NEUROPATHY: Primary | ICD-10-CM

## 2021-05-04 LAB
ERYTHROCYTE [SEDIMENTATION RATE] IN BLOOD: 16 MM/HOUR (ref 0–19)
FOLATE SERPL-MCNC: 15.1 NG/ML (ref 3.1–17.5)
RPR SER QL: NORMAL
VIT B12 SERPL-MCNC: 317 PG/ML (ref 100–900)

## 2021-05-04 PROCEDURE — 82607 VITAMIN B-12: CPT

## 2021-05-04 PROCEDURE — 84165 PROTEIN E-PHORESIS SERUM: CPT | Performed by: PATHOLOGY

## 2021-05-04 PROCEDURE — 86618 LYME DISEASE ANTIBODY: CPT

## 2021-05-04 PROCEDURE — 86592 SYPHILIS TEST NON-TREP QUAL: CPT

## 2021-05-04 PROCEDURE — 99215 OFFICE O/P EST HI 40 MIN: CPT | Performed by: PSYCHIATRY & NEUROLOGY

## 2021-05-04 PROCEDURE — 86038 ANTINUCLEAR ANTIBODIES: CPT

## 2021-05-04 PROCEDURE — 86617 LYME DISEASE ANTIBODY: CPT

## 2021-05-04 PROCEDURE — 36415 COLL VENOUS BLD VENIPUNCTURE: CPT

## 2021-05-04 PROCEDURE — 84165 PROTEIN E-PHORESIS SERUM: CPT

## 2021-05-04 PROCEDURE — 85652 RBC SED RATE AUTOMATED: CPT

## 2021-05-04 PROCEDURE — 82746 ASSAY OF FOLIC ACID SERUM: CPT

## 2021-05-04 RX ORDER — GABAPENTIN 100 MG/1
CAPSULE ORAL
Qty: 100 CAPSULE | Refills: 5 | Status: SHIPPED | OUTPATIENT
Start: 2021-05-04 | End: 2021-05-17 | Stop reason: SDUPTHER

## 2021-05-04 NOTE — PROGRESS NOTES
Lisbeth Hamman is a 74 y o  male  Who presents today with complaints of neuropathy and memory difficulty    Assessment:  1  Peripheral neuropathy    2  Memory loss        Plan:   MRI brain neuroquant   Blood work   Gabapentin 100-300 mg 3 times daily   Follow-up 6 weeks    Discussion:   Milli Rubio has a history of peripheral neuropathy that began around the time of his liver failure almost 20 years ago  He has had significant symptoms of neuropathic pain since that time  He apparently has been on various medications in the past but he states that nothing work  He states it has been many years since anything is been tried and does not recall the doses of the medicines he was taking  He did have an EMG study performed which demonstrated changes consistent with peripheral neuropathy  Have recommended a trial of gabapentin starting to 100 mg and titrating up to 300 mg 3 times daily if necessary  He will notify me if this is not effective or not tolerated  He also has complaints of memory difficulty which may in part be related to concentration difficulties in his chronic pain  He scored in the normal range on [place] testing  Will obtain an MRI brain neuroquant and blood work to rule out secondary etiologies and I will see him back after these are completed in about 6 weeks      Subjective:    HPI   Milli Rubio is a right-handed man who presents with the above complaints  He states that he developed liver failure almost 20 years ago and eventually underwent a liver transplant  He states when he was diagnosed with his liver disease he had numbness and pain in his feet  He describes a burning electric shock stabbing type pain  He also reports an achy type pain and some tenderness with walking  He states in the past he was tried on various medications including gabapentin, Lyrica and a seizure medication  He cannot remember the dosage of the medications that he was on but did not find anything to be very effective  He had an EMG study performed within the last year which demonstrated changes consistent with peripheral neuropathy  He states as a result he has more problems with his balance and has had some falls  In addition to his neuropathy he reports problems with memory  He states for the last couple years he feels that his short-term memory is not as good as it should be  He states that his wife will tell him something and he will forget it  He states he plans to do something he has to write it down or he will remember it  He states if he goes to the store for just a few items he has to write them down he will remember them all  He denies any problems navigating her driving  He states he handles the finances at home and has no problems paying his bills on time or balancing his checkbook    He denies any problems keeping track of time      Past Medical History:   Diagnosis Date    Abnormal electrocardiogram     Abnormal findings on radiological examination of gastrointestinal tract     Abnormal liver enzymes     Acute hepatitis C     Adrenal disorder (HCC)     Aneurysm of unspecified site ([facility] [address] )     Benign neoplasm of skin     Bronchopneumonia     Chronic hepatitis C (HCC)     Chronic obstructive asthma (HCC)     Cirrhosis (HCC)     Colon, diverticulosis     COPD (chronic obstructive pulmonary disease) ([facility] [address] )     Depression     Diabetes mellitus ([facility] [address] )     Drug dependence, continuous abuse ([facility] )     Hyperlipidemia     Hypertension     Laennec's cirrhosis (alcoholic) ([facility] [address] )     Malabsorption syndrome     Mitral valve disorder     Murmur     Nonspecific abnormal finding on cardiac evaluation     Peripheral vascular disease (HCC)     Pleural effusion     Pneumonia     Rectal hemorrhage     Renal failure     Respiratory abnormality     Restrictive lung disease     lung disease other not elsewhere class     Testicular dysfunction     testicular hypfunction other     Tricuspid valve disorders, non-rheumatic     Type 2 diabetes mellitus ([facility] [address] )     uncomplicated controlled      Ventral hernia        Family History:  Family History   Problem Relation Age of Onset    Lung cancer Mother         overlapping sites of lung unspecified laterality     Heart disease Father         cardiac disorder    Stroke Father     Arthritis Family     Cancer Family     Liver disease Family         chronic    Coronary artery disease Family     Diabetes Family     Hypertension Brother     Hypertension Brother     No Known Problems Brother     No Known Problems Brother        Past Surgical History:  Past Surgical History:   Procedure Laterality Date    CHOLECYSTECTOMY      GALLBLADDER SURGERY      HEPATITIS B SURFACE AB QN,LIVER TRANSPLANT (HISTORICAL)      HERNIA REPAIR      JOINT REPLACEMENT      L knee 3/10/20    KNEE SURGERY Left 03/2020    WOUND DEBRIDEMENT Left 4/10/2020    Procedure: EXCISIONAL DEBRIDEMENT;  Surgeon: Beth Weston MD;  Location: [facility] OR;  Service: General       Social History:   reports that he has been smoking cigarettes  He has a 10 00 pack-year smoking history  He has never used smokeless tobacco  He reports current drug use  Drug: Marijuana  He reports that he does not drink alcohol      Allergies:  Dust mite extract, Muscle relief  [capsaicin], Other, Grass extracts [gramineae pollens], Pollen extract, and Prozac [fluoxetine]      Current Outpatient Medications:     aspirin 81 MG tablet, Take 1 tablet by mouth daily, Disp: , Rfl:     atenolol (TENORMIN) 100 mg tablet, TAKE 1 TABLET BY MOUTH TWICE A DAY, Disp: 180 tablet, Rfl: 3    Calcium Carb-Cholecalciferol 1000-800 MG-UNIT TABS, Take 1 tablet by mouth, Disp: , Rfl:     furosemide (LASIX) 20 mg tablet, Take 1 tablet (20 mg total) by mouth daily (Patient taking differently: Take 40 mg by mouth daily ), Disp: 90 tablet, Rfl: 3    NON FORMULARY, Medical Marijuana, Disp: , Rfl:     pantoprazole (PROTONIX) 40 mg tablet, TAKE 1 TABLET BY MOUTH EVERY DAY, Disp: 90 tablet, Rfl: 3    potassium chloride (K-DUR,KLOR-CON) 20 mEq tablet, Take 1 tablet (20 mEq total) by mouth every other day, Disp: 90 tablet, Rfl: 3    pravastatin (PRAVACHOL) 20 mg tablet, TAKE 1 TABLET BY MOUTH EVERY DAY, Disp: 90 tablet, Rfl: 3    sildenafil (REVATIO) 20 mg tablet, Take 1 tablet (20 mg total) by mouth as needed (2-5 pills as needed 1 hour before use) Take 2-4 tablets as needed, Disp: 30 tablet, Rfl: 11    tacrolimus (PROGRAF) 1 mg capsule, Take by mouth , Disp: , Rfl:     tamsulosin (FLOMAX) 0 4 mg, Take 1 capsule (0 4 mg total) by mouth daily with dinner, Disp: 30 capsule, Rfl: 5    gabapentin (NEURONTIN) 100 mg capsule, 1-3 pills 3 times daily as directed, Disp: 100 capsule, Rfl: 5    predniSONE 10 mg tablet, Take 3 tablets for 3 days then 2 tablets for 3 days then 1 tablet for 3 days (Patient not taking: Reported on 5/4/2021), Disp: 18 tablet, Rfl: 0     I have reviewed the past medical, social and family history, current medications, allergies, vitals, review of systems and updated this information as appropriate today     Objective:    Vitals:  Blood pressure 152/90, pulse 56, height 6' (1 829 m), weight 77 1 kg (170 lb)  Physical Exam    Neurological Exam    GENERAL:  Cooperative in no acute distress  Well-developed and well-nourished    HEAD and NECK   Head is atraumatic normocephalic with no lesions or masses  Neck is supple with full range of motion    CARDIOVASCULAR  Carotid Arteries-no carotid bruits  NEUROLOGIC:  Mental Status-the patient is awake alert and oriented without aphasia or apraxia  He scored 27+ 1/30 on [place]  Cranial Nerves: Visual fields are full to confrontation  Extraocular movements are full without nystagmus  Pupils are 2-1/2 mm and reactive  Face is symmetrical to light touch  Movements of facial expression move symmetrically  Hearing is normal to finger rub bilaterally  Soft palate lifts symmetrically   Shoulder shrug is symmetrical  Tongue is midline without atrophy  Motor: No drift is noted on arm extension  Strength is full in the upper and lower extremities with normal bulk and tone except decreased bulk in the intrinsic muscles of the feet  Sensory: absent temperature and vibratory sensation in the distal lower extremities bilaterally above the knees  Cortical function is intact  Coordination: Finger to nose testing is performed accurately  Romberg is   Positive with eyes closed  Gait reveals an increase base with symmetrical arm swing  Tandem walk could not be performed without stepping off  Reflexes: 2+ in the biceps, triceps and brachioradialis regions, 2 at the knees and trace at the ankles  Toes are downgoing            ROS:    Review of Systems   Constitutional: Negative  Negative for appetite change and fever  HENT: Negative  Negative for hearing loss, tinnitus, trouble swallowing and voice change  Eyes: Negative  Negative for photophobia and pain  Respiratory: Negative  Negative for shortness of breath  Cardiovascular: Negative  Negative for palpitations  Gastrointestinal: Negative  Negative for nausea and vomiting  Endocrine: Negative  Negative for cold intolerance  Genitourinary: Negative  Negative for dysuria, frequency and urgency  Musculoskeletal: Positive for back pain, gait problem and myalgias  Negative for neck pain  Leg pain   Skin: Negative  Negative for rash  Neurological: Positive for numbness  Negative for dizziness, tremors, seizures, syncope, facial asymmetry, speech difficulty, weakness, light-headedness and headaches  Hematological: Negative  Does not bruise/bleed easily  Psychiatric/Behavioral: Positive for confusion  Negative for hallucinations and sleep disturbance

## 2021-05-05 LAB
B BURGDOR IGG+IGM SER-ACNC: 1117
RYE IGE QN: NEGATIVE

## 2021-05-06 ENCOUNTER — TELEPHONE (OUTPATIENT)
Dept: NEUROLOGY | Facility: CLINIC | Age: 66
End: 2021-05-06

## 2021-05-06 LAB
ALBUMIN SERPL ELPH-MCNC: 4.81 G/DL (ref 3.5–5)
ALBUMIN SERPL ELPH-MCNC: 60.1 % (ref 52–65)
ALPHA1 GLOB SERPL ELPH-MCNC: 0.34 G/DL (ref 0.1–0.4)
ALPHA1 GLOB SERPL ELPH-MCNC: 4.2 % (ref 2.5–5)
ALPHA2 GLOB SERPL ELPH-MCNC: 0.87 G/DL (ref 0.4–1.2)
ALPHA2 GLOB SERPL ELPH-MCNC: 10.9 % (ref 7–13)
B BURGDOR IGG PATRN SER IB-IMP: POSITIVE
B BURGDOR IGM PATRN SER IB-IMP: NEGATIVE
B BURGDOR18KD IGG SER QL IB: PRESENT
B BURGDOR23KD IGG SER QL IB: PRESENT
B BURGDOR23KD IGM SER QL IB: ABNORMAL
B BURGDOR28KD IGG SER QL IB: PRESENT
B BURGDOR30KD IGG SER QL IB: PRESENT
B BURGDOR39KD IGG SER QL IB: PRESENT
B BURGDOR39KD IGM SER QL IB: ABNORMAL
B BURGDOR41KD IGG SER QL IB: PRESENT
B BURGDOR41KD IGM SER QL IB: PRESENT
B BURGDOR45KD IGG SER QL IB: ABNORMAL
B BURGDOR58KD IGG SER QL IB: PRESENT
B BURGDOR66KD IGG SER QL IB: PRESENT
B BURGDOR93KD IGG SER QL IB: PRESENT
BETA GLOB ABNORMAL SERPL ELPH-MCNC: 0.43 G/DL (ref 0.4–0.8)
BETA1 GLOB SERPL ELPH-MCNC: 5.4 % (ref 5–13)
BETA2 GLOB SERPL ELPH-MCNC: 5.2 % (ref 2–8)
BETA2+GAMMA GLOB SERPL ELPH-MCNC: 0.42 G/DL (ref 0.2–0.5)
GAMMA GLOB ABNORMAL SERPL ELPH-MCNC: 1.14 G/DL (ref 0.5–1.6)
GAMMA GLOB SERPL ELPH-MCNC: 14.2 % (ref 12–22)
IGG/ALB SER: 1.51 {RATIO} (ref 1.1–1.8)
PROT PATTERN SERPL ELPH-IMP: NORMAL
PROT SERPL-MCNC: 8 G/DL (ref 6.4–8.2)

## 2021-05-06 NOTE — TELEPHONE ENCOUNTER
Pt called, states that he is returning your call regarding blood work    Please call him at [phone] to leave detailed message or can send him a [employer] message

## 2021-05-07 DIAGNOSIS — A69.20 LYME DISEASE: Primary | ICD-10-CM

## 2021-05-07 RX ORDER — DOXYCYCLINE HYCLATE 100 MG/1
CAPSULE ORAL
Qty: 56 CAPSULE | Refills: 0 | Status: SHIPPED | OUTPATIENT
Start: 2021-05-07 | End: 2021-06-04

## 2021-05-07 NOTE — TELEPHONE ENCOUNTER
Left message on patient's voicemail that his Lyme titer did come back positive and anticipate treating him with doxycycline for 4 weeks    Prescription was sent to his pharmacy if he has questions he will call back

## 2021-05-11 ENCOUNTER — TELEPHONE (OUTPATIENT)
Dept: NEUROLOGY | Facility: CLINIC | Age: 66
End: 2021-05-11

## 2021-05-11 NOTE — TELEPHONE ENCOUNTER
I spoke with patient who reports being treated for Lyme disease in the recent past with doxycycline and does not have any symptoms to suggest a recurrent infection  He has not had any new tick bites  I explained that the blood test is always going to be positive in that he does not need to treated again  He has found that gabapentin at 200 mg 3 times a day has been more effective in reducing his pain symptoms and plans to increase to 300 mg tomorrow      ----- Message from Leny Krishna sent at 5/11/2021 10:31 AM EDT -----  Regarding: FW: Test Results Question  Contact: [phone]    ----- Message -----  From: Ravin Wilson  Sent: 5/8/2021  12:46 PM EDT  To: Neurology BEHAVIORAL MEDICINE AT [facility]  Subject: Test Results Question                            Dr Holt Pellet this 2x in 2020 & it hasn't gotten rid of it  Is there some other course of action we can take ?

## 2021-05-14 ENCOUNTER — TELEPHONE (OUTPATIENT)
Dept: INTERNAL MEDICINE CLINIC | Facility: CLINIC | Age: 66
End: 2021-05-14

## 2021-05-14 ENCOUNTER — HOSPITAL ENCOUNTER (OUTPATIENT)
Dept: MRI IMAGING | Facility: CLINIC | Age: 66
Discharge: HOME | End: 2021-05-14

## 2021-05-14 DIAGNOSIS — R41.3 MEMORY LOSS: ICD-10-CM

## 2021-05-14 NOTE — TELEPHONE ENCOUNTER
Please call the radiology dept at [facility] and find out what the protocol is  In the past we were able to do an xray pre and post mri  But that may just have been for shunts in the brain   Not sure

## 2021-05-14 NOTE — TELEPHONE ENCOUNTER
Went to get a MRI at [facility]  They don't want to do it due to the fact that he has a coil in his head  Please call patient  He said he had MRI's since he had the coil put in  He wants Leslie Monroy call him [phone]

## 2021-05-14 NOTE — TELEPHONE ENCOUNTER
CALLED RADIOLOGY AT [facility] SPOKE TO RAYSHAWN AND STATED THAT IS CORRECT

## 2021-05-15 ENCOUNTER — PATIENT MESSAGE (OUTPATIENT)
Dept: NEUROLOGY | Facility: CLINIC | Age: 66
End: 2021-05-15

## 2021-05-15 DIAGNOSIS — G62.9 PERIPHERAL NEUROPATHY: ICD-10-CM

## 2021-05-17 DIAGNOSIS — G62.9 PERIPHERAL NEUROPATHY: ICD-10-CM

## 2021-05-17 RX ORDER — GABAPENTIN 100 MG/1
300 CAPSULE ORAL 3 TIMES DAILY
Qty: 270 CAPSULE | Refills: 2 | Status: SHIPPED | OUTPATIENT
Start: 2021-05-17 | End: 2021-05-17

## 2021-05-17 RX ORDER — GABAPENTIN 100 MG/1
300 CAPSULE ORAL 3 TIMES DAILY
Qty: 270 CAPSULE | Refills: 2 | Status: SHIPPED | OUTPATIENT
Start: 2021-05-17 | End: 2021-07-28

## 2021-05-18 ENCOUNTER — HOSPITAL ENCOUNTER (OUTPATIENT)
Dept: MRI IMAGING | Facility: CLINIC | Age: 66
Discharge: HOME | End: 2021-05-18
Payer: MEDICARE

## 2021-05-18 DIAGNOSIS — G62.9 PERIPHERAL NEUROPATHY: Primary | ICD-10-CM

## 2021-05-18 DIAGNOSIS — G62.9 PERIPHERAL NEUROPATHY: ICD-10-CM

## 2021-05-18 PROCEDURE — G1004 CDSM NDSC: HCPCS

## 2021-05-18 PROCEDURE — 70551 MRI BRAIN STEM W/O DYE: CPT

## 2021-05-18 RX ORDER — GABAPENTIN 300 MG/1
300 CAPSULE ORAL 3 TIMES DAILY
Qty: 90 CAPSULE | Refills: 3 | Status: SHIPPED | OUTPATIENT
Start: 2021-05-18 | End: 2021-05-18 | Stop reason: SDUPTHER

## 2021-05-18 RX ORDER — GABAPENTIN 300 MG/1
300 CAPSULE ORAL 3 TIMES DAILY
Qty: 90 CAPSULE | Refills: 3 | Status: SHIPPED | OUTPATIENT
Start: 2021-05-18 | End: 2021-07-28

## 2021-05-18 NOTE — TELEPHONE ENCOUNTER
Patient requesting 300 mg gabapentin TID  Patient is all out of medication       Patient has a f/u on 7/2/2021    [facility]

## 2021-05-24 ENCOUNTER — TELEPHONE (OUTPATIENT)
Dept: INTERNAL MEDICINE CLINIC | Facility: CLINIC | Age: 66
End: 2021-05-24

## 2021-05-24 NOTE — TELEPHONE ENCOUNTER
CALLED [facility] AND THEY JUST NEED ORDER FOR CDE FOR POC [address] FLOW 2L VIA NASAL CANNULA FAXED [phone]

## 2021-05-24 NOTE — TELEPHONE ENCOUNTER
Patient wants to get a portable Inogen ox concentrator    but is not able to get it because Medicare is still being billed for liquid ox, which he's not received since 9/2018, but because 's office has not cancelled it with either [facility] or [facility] (he's not sure which), he's not able to get the Inogen  He wants to keep the concentrator for now, until he can get the Inogen    just DC the liquid ox  [employer]

## 2021-06-11 ENCOUNTER — TELEPHONE (OUTPATIENT)
Dept: INTERNAL MEDICINE CLINIC | Facility: CLINIC | Age: 66
End: 2021-06-11

## 2021-06-11 NOTE — TELEPHONE ENCOUNTER
[facility]    She needs an RX  for portable ox concentrater stating to maintain stat levels of ox above 90 %    Fax

## 2021-06-18 ENCOUNTER — TELEPHONE (OUTPATIENT)
Dept: NEUROLOGY | Facility: CLINIC | Age: 66
End: 2021-06-18

## 2021-06-21 NOTE — TELEPHONE ENCOUNTER
[facility] need the litre flow added to script  For the portable oxygen concentrator       FAX: [phone]  [address]: [phone]

## 2021-06-22 ENCOUNTER — TELEPHONE (OUTPATIENT)
Dept: INTERNAL MEDICINE CLINIC | Facility: CLINIC | Age: 66
End: 2021-06-22

## 2021-06-27 DIAGNOSIS — I25.10 ATHEROSCLEROSIS OF NATIVE CORONARY ARTERY OF NATIVE HEART WITHOUT ANGINA PECTORIS: ICD-10-CM

## 2021-06-27 DIAGNOSIS — R06.00 DYSPNEA ON EXERTION: ICD-10-CM

## 2021-06-28 RX ORDER — POTASSIUM CHLORIDE 1500 MG/1
TABLET, EXTENDED RELEASE ORAL
Qty: 90 TABLET | Refills: 3 | Status: SHIPPED | OUTPATIENT
Start: 2021-06-28 | End: 2022-07-07

## 2021-07-13 ENCOUNTER — TELEPHONE (OUTPATIENT)
Dept: INTERNAL MEDICINE CLINIC | Facility: CLINIC | Age: 66
End: 2021-07-13

## 2021-07-13 NOTE — TELEPHONE ENCOUNTER
----- Message from Matt Menchaca sent at 7/13/2021  5:46 PM EDT -----  Regarding: Non-Urgent Medical Question  Contact: [phone]  Sigrid,I'm using [facility] now & the pharmacist says I should get the shingles vaccine ,is it alright for me to get it,U know with the transplant & all ?

## 2021-07-19 ENCOUNTER — TELEPHONE (OUTPATIENT)
Dept: INTERNAL MEDICINE CLINIC | Facility: CLINIC | Age: 66
End: 2021-07-19

## 2021-07-19 PROBLEM — E11.9 TYPE 2 DIABETES MELLITUS, WITHOUT LONG-TERM CURRENT USE OF INSULIN (HCC): Status: ACTIVE | Noted: 2021-07-19

## 2021-07-19 NOTE — TELEPHONE ENCOUNTER
----- Message from Osman Mora sent at 7/19/2021  2:59 PM EDT -----  Regarding: Prescription Question  Contact: [phone]  Their telling me my insurance won't pay for it till Aug 1st but if the script was changed to the higher dose I don't understand why they won't  They said I could pay for it but it's like $25 & I don't have that till Aug  3rd

## 2021-07-19 NOTE — TELEPHONE ENCOUNTER
I dont understand this, but I can't make the pharmacy give him the medication  If he stops the medication it could cause fluid to build up leading to shortness of breath and leg swelling   If someone has time maybe they can call the pharmacy to see what the issue is

## 2021-07-28 ENCOUNTER — OFFICE VISIT (OUTPATIENT)
Dept: NEUROLOGY | Facility: CLINIC | Age: 66
End: 2021-07-28
Payer: MEDICARE

## 2021-07-28 VITALS
DIASTOLIC BLOOD PRESSURE: 80 MMHG | SYSTOLIC BLOOD PRESSURE: 138 MMHG | WEIGHT: 171 LBS | HEART RATE: 58 BPM | HEIGHT: 72 IN | BODY MASS INDEX: 23.16 KG/M2

## 2021-07-28 DIAGNOSIS — G62.9 PERIPHERAL NEUROPATHY: Primary | ICD-10-CM

## 2021-07-28 DIAGNOSIS — R41.3 MEMORY LOSS: ICD-10-CM

## 2021-07-28 PROCEDURE — 99213 OFFICE O/P EST LOW 20 MIN: CPT | Performed by: PSYCHIATRY & NEUROLOGY

## 2021-07-28 RX ORDER — GABAPENTIN 600 MG/1
TABLET ORAL
Qty: 90 TABLET | Refills: 5 | Status: SHIPPED | OUTPATIENT
Start: 2021-07-28 | End: 2021-10-29 | Stop reason: SDUPTHER

## 2021-07-28 NOTE — PROGRESS NOTES
Masood Arechiga is a 74 y o  male  Returns in follow-up today with history of memory difficulty and neuropathy    Assessment:  1  Peripheral neuropathy    2  Memory loss        Plan:   increase gabapentin to 600 mg 1/2 to 1 t i d  Follow-up 3 months    Discussion:   eLnin Walker reports some improvement with the neuropathic pain in his feet with gabapentin and understands that this may not be effective for his arthralgias in his feet  MRI brain neuroquant did not support a neuro degenerative process regarding memory issues and he did score in the normal range on [place] testing  Blood work was remarkable for a positive Lyme titer and he has been treated for Lyme disease in the past   He had questions of whether he has developed attention deficit disorder but I explained that this would not be something that would appear in his 62s  Will continue to monitor him cognitively and he will increase his gabapentin gradually from 300 mg 3 times daily up to 600 mg 3 times daily if needed  I will see him back in follow-up in a few months      Subjective:    CAMERON Walker returns in follow-up today  He reports that he did find initially the gabapentin was helpful and he has been taking 300 mg 3 times daily but he ran out and cannot fill it until Monday due to financial issues  He continues to have an aching feeling in his feet which I explained is more related to musculoskeletal issues and gabapentin would not help this  He has noted no adverse effects from the medication  He continues to report issues with his memory  He states people tell him something in the next morning he forgets it  He states he does keep lists but forgets look at them  Question if he has developed attention deficit disorder    His brain MRI neuroquant did not support a neuro degenerative process      Past Medical History:   Diagnosis Date    Abnormal electrocardiogram     Abnormal findings on radiological examination of gastrointestinal tract     Abnormal liver enzymes     Acute hepatitis C     Adrenal disorder (HCC)     [facility] unspecified site [facility]     Benign neoplasm of skin     Bronchopneumonia     Chronic hepatitis C (HCC)     Chronic obstructive asthma (HCC)     Cirrhosis (HCC)     Colon, diverticulosis     COPD (chronic obstructive pulmonary disease) [facility]     Depression     Diabetes mellitus [facility]     Drug dependence, continuous abuse ([facility]     Hyperlipidemia     Hypertension     Laennec's [facility]alcoholic) [facility]     Malabsorption syndrome     Mitral valve disorder     Murmur     Nonspecific abnormal finding on cardiac evaluation     Peripheral vascular disease (HCC)     Pleural effusion     Pneumonia     Rectal hemorrhage     Renal failure     Respiratory abnormality     Restrictive lung disease     lung disease other not elsewhere class     Testicular dysfunction     testicular hypfunction other     Tricuspid valve disorders, non-rheumatic     Type 2 diabetes mellitus (HCC)     uncomplicated controlled      Ventral hernia        Family History:  Family History   Problem Relation Age of Onset    Lung cancer Mother         overlapping sites of lung unspecified laterality     Heart disease Father         cardiac disorder    Stroke Father     Arthritis Family     Cancer Family     Liver disease Family         chronic    Coronary artery disease Family     Diabetes Family     Hypertension Brother     Hypertension Brother     No Known Problems Brother     No Known Problems Brother        Past Surgical History:  Past Surgical History:   Procedure Laterality Date    CHOLECYSTECTOMY      GALLBLADDER SURGERY      HEPATITIS B SURFACE AB QN,LIVER TRANSPLANT (HISTORICAL)      HERNIA REPAIR      JOINT REPLACEMENT      L knee 3/10/20    KNEE SURGERY Left 03/2020    WOUND DEBRIDEMENT Left 4/10/2020    Procedure: EXCISIONAL DEBRIDEMENT;  Surgeon: Scotty Quinteros MD;  Location: [facility];  Service: General Social History:   reports that he has been smoking cigarettes  He has a 10 00 pack-year smoking history  He has never used smokeless tobacco  He reports current drug use  Drug: Marijuana  He reports that he does not drink alcohol      Allergies:  Dust mite extract, Muscle relief  [capsaicin], Other, Grass extracts [gramineae pollens], Pollen extract, and Prozac [fluoxetine]      Current Outpatient Medications:     aspirin 81 MG tablet, Take 1 tablet by mouth daily, Disp: , Rfl:     atenolol (TENORMIN) 100 mg tablet, TAKE 1 TABLET BY MOUTH TWICE A DAY, Disp: 180 tablet, Rfl: 3    furosemide (LASIX) 20 mg tablet, Take 1 tablet (20 mg total) by mouth daily, Disp: 90 tablet, Rfl: 3    NON FORMULARY, Medical Marijuana, Disp: , Rfl:     tacrolimus (PROGRAF) 1 mg capsule, Take by mouth , Disp: , Rfl:     Calcium Carb-Cholecalciferol 1000-800 MG-UNIT TABS, Take 1 tablet by mouth, Disp: , Rfl:     gabapentin (NEURONTIN) 100 mg capsule, TAKE 3 CAPSULES (300 MG TOTAL) BY MOUTH 3 (THREE) TIMES A DAY, Disp: 270 capsule, Rfl: 2    gabapentin (NEURONTIN) 300 mg capsule, Take 1 capsule (300 mg total) by mouth 3 (three) times a day (Patient not taking: Reported on 7/28/2021), Disp: 90 capsule, Rfl: 3    Klor-Con M20 20 MEQ tablet, TAKE 1 TABLET (20 MEQ TOTAL) BY MOUTH EVERY OTHER DAY (Patient not taking: Reported on 7/28/2021), Disp: 90 tablet, Rfl: 3    pantoprazole (PROTONIX) 40 mg tablet, TAKE 1 TABLET BY MOUTH EVERY DAY (Patient not taking: Reported on 7/28/2021), Disp: 90 tablet, Rfl: 3    pravastatin (PRAVACHOL) 20 mg tablet, TAKE 1 TABLET BY MOUTH EVERY DAY (Patient not taking: Reported on 7/28/2021), Disp: 90 tablet, Rfl: 3    sildenafil (REVATIO) 20 mg tablet, Take 1 tablet (20 mg total) by mouth as needed (2-5 pills as needed 1 hour before use) Take 2-4 tablets as needed (Patient not taking: Reported on 7/28/2021), Disp: 30 tablet, Rfl: 11    tamsulosin (FLOMAX) 0 4 mg, Take 1 capsule (0 4 mg total) by mouth daily with dinner (Patient not taking: Reported on 7/28/2021), Disp: 30 capsule, Rfl: 5     I have reviewed the past medical, social and family history, current medications, allergies, vitals, review of systems and updated this information as appropriate today     Objective:    Vitals:  Blood pressure 138/80, pulse 58, height 6' (1 829 m), weight 77 6 kg (171 lb)  Physical Exam    Neurological Exam   GENERAL:  Well-developed well-nourished man in no acute distress  HEENT/NECK: Head is atraumatic normocephalic, neck is supple  NEUROLOGIC:  Mental Status: Awake and alert without aphasia  Cranial Nerves: Extraocular movements are full  Face is symmetrical  Coordination:  Gait is stable with a straight cane            ROS:    Review of Systems   Constitutional: Negative  Negative for appetite change, fatigue and fever  HENT: Negative  Negative for hearing loss, tinnitus, trouble swallowing and voice change  Eyes: Negative  Negative for photophobia and pain  Respiratory: Negative  Negative for shortness of breath  Cardiovascular: Negative  Negative for palpitations  Gastrointestinal: Negative  Negative for nausea and vomiting  Endocrine: Negative  Negative for cold intolerance  Genitourinary: Negative  Negative for dysuria, frequency and urgency  Musculoskeletal: Positive for back pain, gait problem, myalgias and neck pain  Bilateral foot / leg pain   Skin: Negative  Negative for rash  Neurological: Positive for dizziness, weakness and numbness  Negative for tremors, seizures, syncope, facial asymmetry, speech difficulty, light-headedness and headaches  Hematological: Negative  Does not bruise/bleed easily  Psychiatric/Behavioral: Positive for confusion and decreased concentration  Negative for agitation, hallucinations and sleep disturbance  The patient is nervous/anxious

## 2021-08-09 DIAGNOSIS — I10 HYPERTENSION, UNSPECIFIED TYPE: ICD-10-CM

## 2021-08-09 DIAGNOSIS — K21.00 ESOPHAGITIS, REFLUX: ICD-10-CM

## 2021-08-10 RX ORDER — PANTOPRAZOLE SODIUM 40 MG/1
40 TABLET, DELAYED RELEASE ORAL DAILY
Qty: 90 TABLET | Refills: 3 | Status: SHIPPED | OUTPATIENT
Start: 2021-08-10

## 2021-08-10 RX ORDER — ATENOLOL 100 MG/1
100 TABLET ORAL 2 TIMES DAILY
Qty: 180 TABLET | Refills: 3 | Status: SHIPPED | OUTPATIENT
Start: 2021-08-10

## 2021-08-13 ENCOUNTER — APPOINTMENT (OUTPATIENT)
Dept: LAB | Facility: CLINIC | Age: 66
End: 2021-08-13
Payer: MEDICARE

## 2021-08-13 DIAGNOSIS — E78.2 MIXED HYPERLIPIDEMIA: ICD-10-CM

## 2021-08-13 DIAGNOSIS — I10 ESSENTIAL HYPERTENSION: ICD-10-CM

## 2021-08-13 DIAGNOSIS — R73.01 IMPAIRED FASTING GLUCOSE: ICD-10-CM

## 2021-08-13 LAB
ALBUMIN SERPL BCP-MCNC: 3.8 G/DL (ref 3.5–5)
ALP SERPL-CCNC: 60 U/L (ref 46–116)
ALT SERPL W P-5'-P-CCNC: 19 U/L (ref 12–78)
ANION GAP SERPL CALCULATED.3IONS-SCNC: 5 MMOL/L (ref 4–13)
AST SERPL W P-5'-P-CCNC: 13 U/L (ref 5–45)
BASOPHILS # BLD AUTO: 0.05 THOUSANDS/ΜL (ref 0–0.1)
BASOPHILS NFR BLD AUTO: 1 % (ref 0–1)
BILIRUB SERPL-MCNC: 0.74 MG/DL (ref 0.2–1)
BUN SERPL-MCNC: 20 MG/DL (ref 5–25)
CALCIUM SERPL-MCNC: 8.9 MG/DL (ref 8.3–10.1)
CHLORIDE SERPL-SCNC: 104 MMOL/L (ref 100–108)
CHOLEST SERPL-MCNC: 129 MG/DL (ref 50–200)
CO2 SERPL-SCNC: 25 MMOL/L (ref 21–32)
CREAT SERPL-MCNC: 1.51 MG/DL (ref 0.6–1.3)
EOSINOPHIL # BLD AUTO: 0.08 THOUSAND/ΜL (ref 0–0.61)
EOSINOPHIL NFR BLD AUTO: 1 % (ref 0–6)
ERYTHROCYTE [DISTWIDTH] IN BLOOD BY AUTOMATED COUNT: 12.7 % (ref 11.6–15.1)
EST. AVERAGE GLUCOSE BLD GHB EST-MCNC: 120 MG/DL
GFR SERPL CREATININE-BSD FRML MDRD: 47 ML/MIN/1.73SQ M
GLUCOSE SERPL-MCNC: 118 MG/DL (ref 65–140)
HBA1C MFR BLD: 5.8 %
HCT VFR BLD AUTO: 44.1 % (ref 36.5–49.3)
HDLC SERPL-MCNC: 39 MG/DL
HGB BLD-MCNC: 15.2 G/DL (ref 12–17)
IMM GRANULOCYTES # BLD AUTO: 0.03 THOUSAND/UL (ref 0–0.2)
IMM GRANULOCYTES NFR BLD AUTO: 1 % (ref 0–2)
LDLC SERPL CALC-MCNC: 62 MG/DL (ref 0–100)
LYMPHOCYTES # BLD AUTO: 1.5 THOUSANDS/ΜL (ref 0.6–4.47)
LYMPHOCYTES NFR BLD AUTO: 23 % (ref 14–44)
MCH RBC QN AUTO: 33.3 PG (ref 26.8–34.3)
MCHC RBC AUTO-ENTMCNC: 34.5 G/DL (ref 31.4–37.4)
MCV RBC AUTO: 97 FL (ref 82–98)
MONOCYTES # BLD AUTO: 0.62 THOUSAND/ΜL (ref 0.17–1.22)
MONOCYTES NFR BLD AUTO: 9 % (ref 4–12)
NEUTROPHILS # BLD AUTO: 4.32 THOUSANDS/ΜL (ref 1.85–7.62)
NEUTS SEG NFR BLD AUTO: 65 % (ref 43–75)
NONHDLC SERPL-MCNC: 90 MG/DL
NRBC BLD AUTO-RTO: 0 /100 WBCS
PLATELET # BLD AUTO: 197 THOUSANDS/UL (ref 149–390)
PMV BLD AUTO: 11.1 FL (ref 8.9–12.7)
POTASSIUM SERPL-SCNC: 4 MMOL/L (ref 3.5–5.3)
PROT SERPL-MCNC: 7.6 G/DL (ref 6.4–8.2)
RBC # BLD AUTO: 4.57 MILLION/UL (ref 3.88–5.62)
SODIUM SERPL-SCNC: 134 MMOL/L (ref 136–145)
TRIGL SERPL-MCNC: 142 MG/DL
TSH SERPL DL<=0.05 MIU/L-ACNC: 0.43 UIU/ML (ref 0.36–3.74)
WBC # BLD AUTO: 6.6 THOUSAND/UL (ref 4.31–10.16)

## 2021-08-13 PROCEDURE — 85025 COMPLETE CBC W/AUTO DIFF WBC: CPT

## 2021-08-13 PROCEDURE — 36415 COLL VENOUS BLD VENIPUNCTURE: CPT

## 2021-08-13 PROCEDURE — 84443 ASSAY THYROID STIM HORMONE: CPT

## 2021-08-13 PROCEDURE — 83036 HEMOGLOBIN GLYCOSYLATED A1C: CPT

## 2021-08-13 PROCEDURE — 80061 LIPID PANEL: CPT

## 2021-08-13 PROCEDURE — 80053 COMPREHEN METABOLIC PANEL: CPT

## 2021-08-16 ENCOUNTER — OFFICE VISIT (OUTPATIENT)
Dept: INTERNAL MEDICINE CLINIC | Facility: CLINIC | Age: 66
End: 2021-08-16
Payer: MEDICARE

## 2021-08-16 VITALS
OXYGEN SATURATION: 99 % | WEIGHT: 169.2 LBS | HEIGHT: 72 IN | BODY MASS INDEX: 22.92 KG/M2 | SYSTOLIC BLOOD PRESSURE: 124 MMHG | HEART RATE: 55 BPM | DIASTOLIC BLOOD PRESSURE: 74 MMHG

## 2021-08-16 DIAGNOSIS — D68.9 BLOOD CLOTTING DISORDER (HCC): ICD-10-CM

## 2021-08-16 DIAGNOSIS — B18.2 HEPATITIS C VIRUS CARRIER STATE (HCC): ICD-10-CM

## 2021-08-16 DIAGNOSIS — J43.8 OTHER EMPHYSEMA (HCC): ICD-10-CM

## 2021-08-16 DIAGNOSIS — Z72.0 TOBACCO ABUSE: ICD-10-CM

## 2021-08-16 DIAGNOSIS — F33.9 DEPRESSION, RECURRENT (HCC): ICD-10-CM

## 2021-08-16 DIAGNOSIS — Z94.4 LIVER TRANSPLANTED (HCC): Primary | ICD-10-CM

## 2021-08-16 DIAGNOSIS — E11.69 TYPE 2 DIABETES MELLITUS WITH OTHER SPECIFIED COMPLICATION, WITHOUT LONG-TERM CURRENT USE OF INSULIN (HCC): ICD-10-CM

## 2021-08-16 DIAGNOSIS — I77.89 OTHER SPECIFIED DISORDERS OF ARTERIES AND ARTERIOLES (HCC): ICD-10-CM

## 2021-08-16 DIAGNOSIS — I25.10 ARTERIOSCLEROSIS OF CORONARY ARTERY: ICD-10-CM

## 2021-08-16 DIAGNOSIS — I10 ESSENTIAL HYPERTENSION: ICD-10-CM

## 2021-08-16 DIAGNOSIS — G60.9 IDIOPATHIC PERIPHERAL NEUROPATHY: ICD-10-CM

## 2021-08-16 DIAGNOSIS — R53.83 OTHER FATIGUE: ICD-10-CM

## 2021-08-16 DIAGNOSIS — N28.9 RENAL INSUFFICIENCY: ICD-10-CM

## 2021-08-16 PROCEDURE — 99214 OFFICE O/P EST MOD 30 MIN: CPT | Performed by: NURSE PRACTITIONER

## 2021-08-16 NOTE — PROGRESS NOTES
Assessment/Plan:    Patient Instructions     Status post liver transplant stable following with transplant team     type 2 diabetes, A1c has been relatively stable over the past 5 years  Continue diet exercise and weight loss efforts     neuropathy following with Neurology recently had gabapentin increased      Renal insufficiency likely related to recently taking naproxen, avoid all anti-inflammatory increase fluids recheck labs 1 week     coronary artery disease/htn/hpl stable without angina discussed the importance of continuing baby aspirin     COPD has oxygen at home uses periodically otherwise stable         Diagnoses and all orders for this visit:    Liver transplanted ([facility] )    Type 2 diabetes mellitus with other specified complication, without long-term current use of insulin ([place] [address] )  -     IRIS Diabetic eye exam  -     Hemoglobin A1C; Future  -     Microalbumin / creatinine urine ratio; Future    Essential hypertension  -     CBC and differential; Future  -     Comprehensive metabolic panel; Future    Idiopathic peripheral neuropathy    Tobacco abuse    Arteriosclerosis of coronary artery  -     Lipid panel; Future    Other fatigue  -     TSH, 3rd generation with Free T4 reflex; Future    Depression, recurrent ([place] [address] )    Other specified disorders of arteries and arterioles ([place] [address] )    Blood clotting disorder (HCC)    Hepatitis C virus carrier state [facility])    Renal insufficiency  -     Basic metabolic panel;  Future    Other emphysema ([place] [address] )    Other orders  -     Cancel: Microalbumin / creatinine urine ratio         Subjective:      Patient ID: Ash Cotto is a 74 y o  male    Pt is here for 6 month follow   Didn't renew his medical [employer] card  Was using for paiin and sleep  Now both are bad  Following w/ neurology for neuropathy and memory loss  Still smoking  More active  Lots of stress b/c of needing to find a new place to live  No home bps  Had been doubling his lasix then ran out was out of th mediation for aobut 2-3 weeks just started back up   Admits did take some naproxen recently for pain        Current Outpatient Medications:     atenolol (TENORMIN) 100 mg tablet, Take 1 tablet (100 mg total) by mouth 2 (two) times a day, Disp: 180 tablet, Rfl: 3    furosemide (LASIX) 20 mg tablet, Take 1 tablet (20 mg total) by mouth daily, Disp: 90 tablet, Rfl: 3    gabapentin (NEURONTIN) 600 MG tablet, 1/2 to 1 p o  t i d  as directed, Disp: 90 tablet, Rfl: 5    Klor-Con M20 20 MEQ tablet, TAKE 1 TABLET (20 MEQ TOTAL) BY MOUTH EVERY OTHER DAY, Disp: 90 tablet, Rfl: 3    NON FORMULARY, Medical Marijuana, Disp: , Rfl:     pantoprazole (PROTONIX) 40 mg tablet, Take 1 tablet (40 mg total) by mouth daily, Disp: 90 tablet, Rfl: 3    pravastatin (PRAVACHOL) 20 mg tablet, TAKE 1 TABLET BY MOUTH EVERY DAY, Disp: 90 tablet, Rfl: 3    tacrolimus (PROGRAF) 1 mg capsule, Take by mouth , Disp: , Rfl:     aspirin 81 MG tablet, Take 1 tablet by mouth daily (Patient not taking: Reported on 8/16/2021), Disp: , Rfl:     Recent Results (from the past 1008 hour(s))   CBC and differential    Collection Time: 08/13/21  1:23 PM   Result Value Ref Range    WBC 6 60 4 31 - 10 16 Thousand/uL    RBC 4 57 3 88 - 5 62 Million/uL    Hemoglobin 15 2 12 0 - 17 0 g/dL    Hematocrit 44 1 36 5 - 49 3 %    MCV 97 82 - 98 fL    MCH 33 3 26 8 - 34 3 pg    MCHC 34 5 31 4 - 37 4 g/dL    RDW 12 7 11 6 - 15 1 %    MPV 11 1 8 9 - 12 7 fL    Platelets 406 768 - 676 Thousands/uL    nRBC 0 /100 WBCs    Neutrophils Relative 65 43 - 75 %    Immat GRANS % 1 0 - 2 %    Lymphocytes Relative 23 14 - 44 %    Monocytes Relative 9 4 - 12 %    Eosinophils Relative 1 0 - 6 %    Basophils Relative 1 0 - 1 %    Neutrophils Absolute 4 32 1 85 - 7 62 Thousands/µL    Immature Grans Absolute 0 03 0 00 - 0 20 Thousand/uL    Lymphocytes Absolute 1 50 0 60 - 4 47 Thousands/µL    Monocytes Absolute 0 62 0 17 - 1 22 Thousand/µL    Eosinophils Absolute 0 08 0 00 - 0 61 Thousand/µL Basophils Absolute 0 05 0 00 - 0 10 Thousands/µL   Comprehensive metabolic panel    Collection Time: 08/13/21  1:23 PM   Result Value Ref Range    Sodium 134 (L) 136 - 145 mmol/L    Potassium 4 0 3 5 - 5 3 mmol/L    Chloride 104 100 - 108 mmol/L    CO2 25 21 - 32 mmol/L    ANION GAP 5 4 - 13 mmol/L    BUN 20 5 - 25 mg/dL    Creatinine 1 51 (H) 0 60 - 1 30 mg/dL    Glucose 118 65 - 140 mg/dL    Calcium 8 9 8 3 - 10 1 mg/dL    AST 13 5 - 45 U/L    ALT 19 12 - 78 U/L    Alkaline Phosphatase 60 46 - 116 U/L    Total Protein 7 6 6 4 - 8 2 g/dL    Albumin 3 8 3 5 - 5 0 g/dL    Total Bilirubin 0 74 0 20 - 1 00 mg/dL    eGFR 47 ml/min/1 73sq m   Lipid panel    Collection Time: 08/13/21  1:23 PM   Result Value Ref Range    Cholesterol 129 50 - 200 mg/dL    Triglycerides 142 <=150 mg/dL    HDL, Direct 39 (L) >=40 mg/dL    LDL Calculated 62 0 - 100 mg/dL    Non-HDL-Chol (CHOL-HDL) 90 mg/dl   Hemoglobin A1C    Collection Time: 08/13/21  1:23 PM   Result Value Ref Range    Hemoglobin A1C 5 8 (H) Normal 3 8-5 6%; PreDiabetic 5 7-6 4%; Diabetic >=6 5%; Glycemic control for adults with diabetes <7 0% %     mg/dl   TSH, 3rd generation with Free T4 reflex    Collection Time: 08/13/21  1:23 PM   Result Value Ref Range    TSH 3RD GENERATON 0 431 0 358 - 3 740 uIU/mL       The following portions of the patient's history were reviewed and updated as appropriate: allergies, current medications, past family history, past medical history, past social history, past surgical history and problem list      Review of Systems   Constitutional: Negative for appetite change, chills, diaphoresis, fatigue, fever and unexpected weight change  HENT: Negative for postnasal drip and sneezing  Eyes: Negative for visual disturbance  Respiratory: Negative for chest tightness and shortness of breath  Cardiovascular: Negative for chest pain, palpitations and leg swelling  Gastrointestinal: Negative for abdominal pain and blood in stool  Endocrine: Negative for cold intolerance, heat intolerance, polydipsia, polyphagia and polyuria  Genitourinary: Negative for difficulty urinating, dysuria, frequency and urgency  Musculoskeletal: Negative for arthralgias and myalgias  Skin: Negative for rash and wound  Neurological: Positive for numbness  Negative for dizziness, weakness, light-headedness and headaches  Hematological: Negative for adenopathy  Psychiatric/Behavioral: Positive for sleep disturbance  Negative for confusion and dysphoric mood  The patient is not nervous/anxious  Objective:      /74 (BP Location: Left arm, Patient Position: Sitting, Cuff Size: Standard)   Pulse 55   Ht 6' (1 829 m)   Wt 76 7 kg (169 lb 3 2 oz)   SpO2 99%   BMI 22 95 kg/m²        Physical Exam  Constitutional:       General: He is not in acute distress  Appearance: He is well-developed  He is not diaphoretic  HENT:      Head: Normocephalic and atraumatic  Nose: Nose normal    Eyes:      Conjunctiva/sclera: Conjunctivae normal       Pupils: Pupils are equal, round, and reactive to light  Neck:      Thyroid: No thyromegaly  Vascular: No JVD  Trachea: No tracheal deviation  Cardiovascular:      Rate and Rhythm: Normal rate and regular rhythm  Pulses:           Dorsalis pedis pulses are 2+ on the right side and 2+ on the left side  Posterior tibial pulses are 2+ on the right side and 2+ on the left side  Heart sounds: Normal heart sounds  No murmur heard  No friction rub  No gallop  Pulmonary:      Effort: Pulmonary effort is normal  No respiratory distress  Breath sounds: Normal breath sounds  No wheezing or rales  Abdominal:      General: Bowel sounds are normal  There is no distension  Palpations: Abdomen is soft  Tenderness: There is no abdominal tenderness  Musculoskeletal:         General: Normal range of motion  Cervical back: Normal range of motion and neck supple  Feet:      Right foot:      Skin integrity: Dry skin present  No ulcer, skin breakdown, erythema, warmth or callus  Left foot:      Skin integrity: Dry skin present  No ulcer, skin breakdown, erythema, warmth or callus  Lymphadenopathy:      Cervical: No cervical adenopathy  Skin:     General: Skin is warm and dry  Findings: No rash  Neurological:      Mental Status: He is alert and oriented to person, place, and time  Cranial Nerves: No cranial nerve deficit  Psychiatric:         Behavior: Behavior normal          Thought Content:  Thought content normal          Judgment: Judgment normal

## 2021-08-16 NOTE — PATIENT INSTRUCTIONS
Status post liver transplant stable following with transplant team     type 2 diabetes, A1c has been relatively stable over the past 5 years    Continue diet exercise and weight loss efforts     neuropathy following with Neurology recently had gabapentin increased      Renal insufficiency likely related to recently taking naproxen, avoid all anti-inflammatory increase fluids recheck labs 1 week     coronary artery disease/htn/hpl stable without angina discussed the importance of continuing baby aspirin     COPD has oxygen at home uses periodically otherwise stable

## 2021-08-23 ENCOUNTER — APPOINTMENT (OUTPATIENT)
Dept: LAB | Facility: CLINIC | Age: 66
End: 2021-08-23
Payer: MEDICARE

## 2021-08-23 DIAGNOSIS — N28.9 RENAL INSUFFICIENCY: ICD-10-CM

## 2021-08-23 LAB
ANION GAP SERPL CALCULATED.3IONS-SCNC: 2 MMOL/L (ref 4–13)
BUN SERPL-MCNC: 18 MG/DL (ref 5–25)
CALCIUM SERPL-MCNC: 8.6 MG/DL (ref 8.3–10.1)
CHLORIDE SERPL-SCNC: 106 MMOL/L (ref 100–108)
CO2 SERPL-SCNC: 29 MMOL/L (ref 21–32)
CREAT SERPL-MCNC: 1.36 MG/DL (ref 0.6–1.3)
GFR SERPL CREATININE-BSD FRML MDRD: 53 ML/MIN/1.73SQ M
GLUCOSE SERPL-MCNC: 121 MG/DL (ref 65–140)
POTASSIUM SERPL-SCNC: 4.3 MMOL/L (ref 3.5–5.3)
SODIUM SERPL-SCNC: 137 MMOL/L (ref 136–145)

## 2021-08-23 PROCEDURE — 80048 BASIC METABOLIC PNL TOTAL CA: CPT

## 2021-08-23 PROCEDURE — 36415 COLL VENOUS BLD VENIPUNCTURE: CPT

## 2021-08-24 ENCOUNTER — TELEPHONE (OUTPATIENT)
Dept: INTERNAL MEDICINE CLINIC | Facility: CLINIC | Age: 66
End: 2021-08-24

## 2021-08-24 ENCOUNTER — IMMUNIZATIONS (OUTPATIENT)
Dept: FAMILY MEDICINE CLINIC | Facility: HOSPITAL | Age: 66
End: 2021-08-24

## 2021-08-24 DIAGNOSIS — Z23 ENCOUNTER FOR IMMUNIZATION: Primary | ICD-10-CM

## 2021-08-24 PROCEDURE — 91300 SARS-COV-2 / COVID-19 MRNA VACCINE (PFIZER-BIONTECH) 30 MCG: CPT

## 2021-08-24 PROCEDURE — 0001A SARS-COV-2 / COVID-19 MRNA VACCINE (PFIZER-BIONTECH) 30 MCG: CPT

## 2021-08-24 NOTE — TELEPHONE ENCOUNTER
----- Message from Malina Petty, [address] sent at 8/24/2021 12:53 PM EDT -----  His kidney function is better but not back to his baseline  There are some test we need to do to follow it up   We need and echo, kidney u/s , labs and a urine test  I have ordered it all

## 2021-08-25 ENCOUNTER — TELEPHONE (OUTPATIENT)
Dept: INTERNAL MEDICINE CLINIC | Facility: CLINIC | Age: 66
End: 2021-08-25

## 2021-08-25 NOTE — TELEPHONE ENCOUNTER
----- Message from Aury Garcia sent at 8/25/2021 10:12 AM EDT -----  Regarding: Visit Follow-Up Question  Contact: [phone]  Obey Matta,since I have to get Ultrasounds tomorrow maybe we could do my liver too they usually do it once a year & I think it's time & since I'm going to be there anyway it would save time

## 2021-08-25 NOTE — TELEPHONE ENCOUNTER
He should reach out to his transplant team for that order so that the results get directed to them appropriately  It is unlikely that they will be able to add on that extra study at the same time of his kidney ultrasound so he may need to reschedule if he wants to do them altogether

## 2021-08-26 ENCOUNTER — HOSPITAL ENCOUNTER (OUTPATIENT)
Dept: ULTRASOUND IMAGING | Facility: CLINIC | Age: 66
Discharge: HOME | End: 2021-08-26
Payer: MEDICARE

## 2021-08-26 DIAGNOSIS — N28.9 RENAL INSUFFICIENCY: ICD-10-CM

## 2021-08-26 PROCEDURE — 76770 US EXAM ABDO BACK WALL COMP: CPT

## 2021-08-27 ENCOUNTER — TELEPHONE (OUTPATIENT)
Dept: INTERNAL MEDICINE CLINIC | Facility: CLINIC | Age: 66
End: 2021-08-27

## 2021-08-27 NOTE — TELEPHONE ENCOUNTER
Pt would like a call back   For directions when he should be getting labs/ urine done    Has echo appt on 09/23/21  Us kidney/ bladder was done yesterday    Please advise

## 2021-09-02 ENCOUNTER — TELEPHONE (OUTPATIENT)
Dept: INTERNAL MEDICINE CLINIC | Facility: CLINIC | Age: 66
End: 2021-09-02

## 2021-10-29 ENCOUNTER — OFFICE VISIT (OUTPATIENT)
Dept: NEUROLOGY | Facility: CLINIC | Age: 66
End: 2021-10-29
Payer: MEDICARE

## 2021-10-29 VITALS
WEIGHT: 169.6 LBS | DIASTOLIC BLOOD PRESSURE: 80 MMHG | BODY MASS INDEX: 23 KG/M2 | HEART RATE: 78 BPM | SYSTOLIC BLOOD PRESSURE: 130 MMHG | TEMPERATURE: 97.2 F

## 2021-10-29 DIAGNOSIS — G62.9 PERIPHERAL NEUROPATHY: Primary | ICD-10-CM

## 2021-10-29 DIAGNOSIS — R41.3 MEMORY LOSS: ICD-10-CM

## 2021-10-29 PROCEDURE — 99213 OFFICE O/P EST LOW 20 MIN: CPT | Performed by: PSYCHIATRY & NEUROLOGY

## 2021-10-29 RX ORDER — GABAPENTIN 600 MG/1
TABLET ORAL
Qty: 135 TABLET | Refills: 5 | Status: SHIPPED | OUTPATIENT
Start: 2021-10-29 | End: 2022-06-10 | Stop reason: SDUPTHER

## 2021-11-05 ENCOUNTER — TELEPHONE (OUTPATIENT)
Dept: NEUROLOGY | Facility: CLINIC | Age: 66
End: 2021-11-05

## 2021-11-18 DIAGNOSIS — R06.00 DYSPNEA ON EXERTION: ICD-10-CM

## 2021-11-18 RX ORDER — FUROSEMIDE 20 MG/1
20 TABLET ORAL DAILY
Qty: 90 TABLET | Refills: 3 | Status: SHIPPED | OUTPATIENT
Start: 2021-11-18

## 2022-02-02 ENCOUNTER — TELEPHONE (OUTPATIENT)
Dept: NEUROLOGY | Facility: CLINIC | Age: 67
End: 2022-02-02

## 2022-02-02 NOTE — TELEPHONE ENCOUNTER
2022, received a form from the [employer] to be filled out and signed by Alyssa Matos  The form is for Medical Exceptions to  for the drug Gabapentin and it was signed by Alyssa Matos  I am going to scan it into patient's chart and mail the form out

## 2022-02-26 DIAGNOSIS — E78.5 HYPERLIPIDEMIA, UNSPECIFIED HYPERLIPIDEMIA TYPE: ICD-10-CM

## 2022-02-26 RX ORDER — PRAVASTATIN SODIUM 20 MG
TABLET ORAL
Qty: 173 TABLET | Refills: 0 | Status: SHIPPED | OUTPATIENT
Start: 2022-02-26

## 2022-02-28 ENCOUNTER — OFFICE VISIT (OUTPATIENT)
Dept: INTERNAL MEDICINE CLINIC | Facility: CLINIC | Age: 67
End: 2022-02-28
Payer: COMMERCIAL

## 2022-02-28 VITALS
HEART RATE: 58 BPM | OXYGEN SATURATION: 98 % | SYSTOLIC BLOOD PRESSURE: 138 MMHG | WEIGHT: 179 LBS | RESPIRATION RATE: 16 BRPM | HEIGHT: 72 IN | BODY MASS INDEX: 24.24 KG/M2 | TEMPERATURE: 97.8 F | DIASTOLIC BLOOD PRESSURE: 74 MMHG

## 2022-02-28 DIAGNOSIS — G60.9 IDIOPATHIC PERIPHERAL NEUROPATHY: ICD-10-CM

## 2022-02-28 DIAGNOSIS — J43.8 OTHER EMPHYSEMA (HCC): ICD-10-CM

## 2022-02-28 DIAGNOSIS — E78.2 MIXED HYPERLIPIDEMIA: ICD-10-CM

## 2022-02-28 DIAGNOSIS — I10 PRIMARY HYPERTENSION: ICD-10-CM

## 2022-02-28 DIAGNOSIS — R73.01 IMPAIRED FASTING GLUCOSE: ICD-10-CM

## 2022-02-28 DIAGNOSIS — I25.10 ARTERIOSCLEROSIS OF CORONARY ARTERY: ICD-10-CM

## 2022-02-28 DIAGNOSIS — Z94.4 LIVER TRANSPLANTED (HCC): Primary | ICD-10-CM

## 2022-02-28 DIAGNOSIS — E11.69 TYPE 2 DIABETES MELLITUS WITH OTHER SPECIFIED COMPLICATION, WITHOUT LONG-TERM CURRENT USE OF INSULIN (HCC): ICD-10-CM

## 2022-02-28 DIAGNOSIS — Z72.0 TOBACCO ABUSE: ICD-10-CM

## 2022-02-28 PROCEDURE — 99214 OFFICE O/P EST MOD 30 MIN: CPT | Performed by: INTERNAL MEDICINE

## 2022-02-28 PROCEDURE — 1123F ACP DISCUSS/DSCN MKR DOCD: CPT | Performed by: INTERNAL MEDICINE

## 2022-02-28 PROCEDURE — G0439 PPPS, SUBSEQ VISIT: HCPCS | Performed by: INTERNAL MEDICINE

## 2022-02-28 NOTE — PATIENT INSTRUCTIONS
No lab data for review    Status post liver transplant for alcoholic cirrhosis with history of hepatitis C-follow with [facility]plant [facility], check labs    Coronary artery disease-stable without angina, continue aggressive risk factor modification    Hyperlipidemia on statin, check lipid profile    Hypertension stable on present regimen    Remote history of diabetes, stable with dietary modification    Peripheral neuropathy under care of Neurology    Tobacco cessation advised    Follow-up labs accordingly  Medicare Preventive Visit Patient Instructions  Thank you for completing your Welcome to Medicare Visit or Medicare Annual Wellness Visit today  Your next wellness visit will be due in one year (3/1/2023)  The screening/preventive services that you may require over the next 5-10 years are detailed below  Some tests may not apply to you based off risk factors and/or age  Screening tests ordered at today's visit but not completed yet may show as past due  Also, please note that scanned in results may not display below  Preventive Screenings:  Service Recommendations Previous Testing/Comments   Colorectal Cancer Screening  · Colonoscopy    · Fecal Occult Blood Test (FOBT)/Fecal Immunochemical Test (FIT)  · Fecal DNA/Cologuard Test  · Flexible Sigmoidoscopy Age: 54-65 years old   Colonoscopy: every 10 years (May be performed more frequently if at higher risk)  OR  FOBT/FIT: every 1 year  OR  Cologuard: every 3 years  OR  Sigmoidoscopy: every 5 years  Screening may be recommended earlier than age 48 if at higher risk for colorectal cancer  Also, an individualized decision between you and your healthcare provider will decide whether screening between the ages of 74-80 would be appropriate   Colonoscopy: 05/07/2013  FOBT/FIT: Not on file  Cologuard: Not on file  Sigmoidoscopy: Not on file    Screening Current     Prostate Cancer Screening Individualized decision between patient and health care provider in men between ages of 53-78   Medicare will cover every 12 months beginning on the day after your 50th birthday PSA: 0 5 ng/mL           Hepatitis C Screening Once for adults born between 1945 and 1965  More frequently in patients at high risk for Hepatitis C Hep C Antibody: Not on file    Screening Not Indicated  History Hepatitis C   Diabetes Screening 1-2 times per year if you're at risk for diabetes or have pre-diabetes Fasting glucose: 96 mg/dL   A1C: 5 8 %    Screening Not Indicated  History Diabetes   Cholesterol Screening Once every 5 years if you don't have a lipid disorder  May order more often based on risk factors  Lipid panel: 08/13/2021    Screening Not Indicated  History Lipid Disorder      Other Preventive Screenings Covered by Medicare:  1  Abdominal Aortic Aneurysm (AAA) Screening: covered once if your at risk  You're considered to be at risk if you have a family history of AAA or a male between the age of 73-68 who smoking at least 100 cigarettes in your lifetime  2  Lung Cancer Screening: covers low dose CT scan once per year if you meet all of the following conditions: (1) Age 50-69; (2) No signs or symptoms of lung cancer; (3) Current smoker or have quit smoking within the last 15 years; (4) You have a tobacco smoking history of at least 30 pack years (packs per day x number of years you smoked); (5) You get a written order from a healthcare provider  3  Glaucoma Screening: covered annually if you're considered high risk: (1) You have diabetes OR (2) Family history of glaucoma OR (3)  aged 48 and older OR (3)  American aged 72 and older  3  Osteoporosis Screening: covered every 2 years if you meet one of the following conditions: (1) Have a vertebral abnormality; (2) On glucocorticoid therapy for more than 3 months; (3) Have primary hyperparathyroidism; (4) On osteoporosis medications and need to assess response to drug therapy    5  HIV Screening: covered annually if you're between the age of 12-76  Also covered annually if you are younger than 13 and older than 72 with risk factors for HIV infection  For pregnant patients, it is covered up to 3 times per pregnancy  Immunizations:  Immunization Recommendations   Influenza Vaccine Annual influenza vaccination during flu season is recommended for all persons aged >= 6 months who do not have contraindications   Pneumococcal Vaccine (Prevnar and Pneumovax)  * Prevnar = PCV13  * Pneumovax = PPSV23 Adults 25-60 years old: 1-3 doses may be recommended based on certain risk factors  Adults 72 years old: Prevnar (PCV13) vaccine recommended followed by Pneumovax (PPSV23) vaccine  If already received PPSV23 since turning 65, then PCV13 recommended at least one year after PPSV23 dose  Hepatitis B Vaccine 3 dose series if at intermediate or high risk (ex: diabetes, end stage renal disease, liver disease)   Tetanus (Td) Vaccine - COST NOT COVERED BY MEDICARE PART B Following completion of primary series, a booster dose should be given every 10 years to maintain immunity against tetanus  Td may also be given as tetanus wound prophylaxis  Tdap Vaccine - COST NOT COVERED BY MEDICARE PART B Recommended at least once for all adults  For pregnant patients, recommended with each pregnancy  Shingles Vaccine (Shingrix) - COST NOT COVERED BY MEDICARE PART B  2 shot series recommended in those aged 48 and above     Health Maintenance Due:      Topic Date Due    DXA SCAN  08/07/2022    Colorectal Cancer Screening  05/07/2023    Hepatitis C Screening  Discontinued     Immunizations Due:      Topic Date Due    Hepatitis B Vaccine (2 of 3 - Risk 3-dose series) 10/11/2002    Hepatitis A Vaccine (2 of 2 - Risk 2-dose series) 03/13/2003    DTaP,Tdap,and Td Vaccines (2 - Td or Tdap) 11/17/2014    COVID-19 Vaccine (4 - Booster for Pfizer series) 01/24/2022     Advance Directives   What are advance directives?   Advance directives are legal documents that state your wishes and plans for medical care  These plans are made ahead of time in case you lose your ability to make decisions for yourself  Advance directives can apply to any medical decision, such as the treatments you want, and if you want to donate organs  What are the types of advance directives? There are many types of advance directives, and each state has rules about how to use them  You may choose a combination of any of the following:  · Living will: This is a written record of the treatment you want  You can also choose which treatments you do not want, which to limit, and which to stop at a certain time  This includes surgery, medicine, IV fluid, and tube feedings  · Durable power of  for healthcare [facility]): This is a written record that states who you want to make healthcare choices for you when you are unable to make them for yourself  This person, called a proxy, is usually a family member or a friend  You may choose more than 1 proxy  · Do not resuscitate (DNR) order:  A DNR order is used in case your heart stops beating or you stop breathing  It is a request not to have certain forms of treatment, such as CPR  A DNR order may be included in other types of advance directives  · Medical directive: This covers the care that you want if you are in a coma, near death, or unable to make decisions for yourself  You can list the treatments you want for each condition  Treatment may include pain medicine, surgery, blood transfusions, dialysis, IV or tube feedings, and a ventilator (breathing machine)  · Values history: This document has questions about your views, beliefs, and how you feel and think about life  This information can help others choose the care that you would choose  Why are advance directives important? An advance directive helps you control your care  Although spoken wishes may be used, it is better to have your wishes written down   Spoken wishes can be misunderstood, or not followed  Treatments may be given even if you do not want them  An advance directive may make it easier for your family to make difficult choices about your care  Fall Prevention    Fall prevention  includes ways to make your home and other areas safer  It also includes ways you can move more carefully to prevent a fall  Health conditions that cause changes in your blood pressure, vision, or muscle strength and coordination may increase your risk for falls  Medicines may also increase your risk for falls if they make you dizzy, weak, or sleepy  Fall prevention tips:   · Stand or sit up slowly  · Use assistive devices as directed  · Wear shoes that fit well and have soles that   · Wear a personal alarm  · Stay active  · Manage your medical conditions  Home Safety Tips:  · Add items to prevent falls in the bathroom  · Keep paths clear  · Install bright lights in your home  · Keep items you use often on shelves within reach  · Paint or place reflective tape on the edges of your stairs  Cigarette Smoking and Your Health   Risks to your health if you smoke:  Nicotine and other chemicals found in tobacco damage every cell in your body  Even if you are a light smoker, you have an increased risk for cancer, heart disease, and lung disease  If you are pregnant or have diabetes, smoking increases your risk for complications  Benefits to your health if you stop smoking:   · You decrease respiratory symptoms such as coughing, wheezing, and shortness of breath  · You reduce your risk for cancers of the lung, mouth, throat, kidney, bladder, pancreas, stomach, and cervix  If you already have cancer, you increase the benefits of chemotherapy  You also reduce your risk for cancer returning or a second cancer from developing  · You reduce your risk for heart disease, blood clots, heart attack, and stroke     · You reduce your risk for lung infections, and diseases such as pneumonia, asthma, chronic bronchitis, and emphysema  · Your circulation improves  More oxygen can be delivered to your body  If you have diabetes, you lower your risk for complications, such as kidney, artery, and eye diseases  You also lower your risk for nerve damage  Nerve damage can lead to amputations, poor vision, and blindness  · You improve your body's ability to heal and to fight infections  For more information and support to stop smoking:   · [employer]  Phone: 0- 575 - [phone]  Web Address: [facility]  [facility] [address] 2018 Information is for End User's use only and may not be sold, redistributed or otherwise used for commercial purposes   All illustrations and images included in CareNotes® are the copyrighted property of A D A M , Inc  or [address] [place] [address]

## 2022-02-28 NOTE — PROGRESS NOTES
Assessment and Plan:     Problem List Items Addressed This Visit     None           Preventive health issues were discussed with patient, and age appropriate screening tests were ordered as noted in patient's After Visit Summary  Personalized health advice and appropriate referrals for health education or preventive services given if needed, as noted in patient's After Visit Summary       History of Present Illness:     Patient presents for Medicare Annual Wellness visit    Patient Care Team:  Johnna Hernandez MD as PCP - General  JOSE Klein MD Curt Earl, MD     Problem List:     Patient Active Problem List   Diagnosis    Anxiety    Arteriosclerosis of coronary artery    [facility]    Esophagitis, reflux    Hyperlipidemia    Hypertension    Immunosuppression ([place] [address] )    Primary osteoarthritis of left knee    Liver transplanted ([place] [address] )    Impaired fasting glucose    Idiopathic peripheral neuropathy    Tobacco abuse    Malignant neoplasm of intrahepatic bile ducts ([facility]    Hepatic fibrosis    Blood clotting disorder ([facility] )    Hepatitis C virus carrier state ([place] [address] )    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[place] [address] )    Screening for prostate cancer    Type 2 diabetes mellitus, without long-term current use of insulin ([place] [address] )    Depression, recurrent ([facility]    Other specified disorders of arteries and arterioles ([place] [address] )      Past Medical and Surgical History:     Past Medical History:   Diagnosis Date    Abnormal electrocardiogram     Abnormal findings on radiological examination of gastrointestinal tract     Abnormal liver enzymes     Acute hepatitis C     Adrenal disorder (HCC)     Aneurysm of unspecified site ([facility]     Benign neoplasm of skin     Bronchopneumonia     Chronic hepatitis C ([facility]     Chronic obstructive asthma (HCC)     Cirrhosis ([facility]     Colon, diverticulosis     COPD (chronic obstructive pulmonary disease) ([facility]     Depression     [facility] mellitus [facility]     Drug dependence, continuous abuse ([facility]     Hyperlipidemia     Hypertension     Laennec's cirrhosis (alcoholic) ([facility] )     Malabsorption syndrome     Mitral valve disorder     Murmur     Nonspecific abnormal finding on cardiac evaluation     Peripheral vascular disease (HCC)     Pleural effusion     Pneumonia     Rectal hemorrhage     Renal failure     Respiratory abnormality     Restrictive lung disease     lung disease other not elsewhere class     Testicular dysfunction     testicular hypfunction other     Tricuspid valve disorders, non-rheumatic     Type 2 diabetes mellitus ([facility] )     uncomplicated controlled      Ventral hernia      Past Surgical History:   Procedure Laterality Date    CHOLECYSTECTOMY      GALLBLADDER SURGERY      HEPATITIS B SURFACE AB QN,LIVER TRANSPLANT (HISTORICAL)      HERNIA REPAIR      JOINT REPLACEMENT      L knee 3/10/20    KNEE SURGERY Left 03/2020    WOUND DEBRIDEMENT Left 4/10/2020    Procedure: EXCISIONAL DEBRIDEMENT;  Surgeon: Eusebio Dejesus MD;  Location: [facility];  Service: General      Family History:     Family History   Problem Relation Age of Onset    Lung cancer Mother         overlapping sites of lung unspecified laterality     Heart disease Father         cardiac disorder    Stroke Father     Arthritis Family     Cancer Family     Liver disease Family         chronic    Coronary artery disease Family     Diabetes Family     Hypertension Brother     Hypertension Brother     No Known Problems Brother     No Known Problems Brother       Social History:     Social History     Socioeconomic History    Marital status:      Spouse name: None    Number of children: None    Years of education: None    Highest education level: None   Occupational History    None   Tobacco Use    Smoking status: Current Every Day Smoker     Packs/day: 0 25     Years: 40 00     Pack years: 10 00     Types: Cigarettes    Smokeless tobacco: Never Used    Tobacco comment: 5 a day    Vaping Use    Vaping Use: Some days    Substances: Nicotine, Flavoring   Substance and Sexual Activity    Alcohol use: No     Comment: no alcohol use     Drug use: Yes     Types: Marijuana     Comment: medical marijuana    Sexual activity: Never   Other Topics Concern    None   Social History Narrative    Disability     Lives with adult children      Social Determinants of Health     Financial Resource Strain: Not on file   Food Insecurity: Not on file   Transportation Needs: Not on file   Physical Activity: Not on file   Stress: Not on file   Social Connections: Not on file   Intimate Partner Violence: Not on file   Housing Stability: Not on file      Medications and Allergies:     Current Outpatient Medications   Medication Sig Dispense Refill    aspirin 81 MG tablet Take 1 tablet by mouth daily        atenolol (TENORMIN) 100 mg tablet Take 1 tablet (100 mg total) by mouth 2 (two) times a day 180 tablet 3    furosemide (LASIX) 20 mg tablet Take 1 tablet (20 mg total) by mouth daily 90 tablet 3    gabapentin (NEURONTIN) 600 MG tablet 1 5 p o  t i d  as directed 135 tablet 5    Klor-Con M20 20 MEQ tablet TAKE 1 TABLET (20 MEQ TOTAL) BY MOUTH EVERY OTHER DAY 90 tablet 3    NON FORMULARY Medical Marijuana      pantoprazole (PROTONIX) 40 mg tablet Take 1 tablet (40 mg total) by mouth daily 90 tablet 3    pravastatin (PRAVACHOL) 20 mg tablet TAKE ONE TABLET BY MOUTH EVERY  tablet 0    tacrolimus (PROGRAF) 1 mg capsule Take by mouth        No current facility-administered medications for this visit       Allergies   Allergen Reactions    Dust Mite Extract Cough, Eye Swelling, Itching and Wheezing    Muscle Relief  [Capsaicin] Confusion, Headache, Lightheadedness, Palpitations, Tinnitus and Vomiting    Other      Muscle relaxer's causes vomiting and nausea     Grass Extracts [Gramineae Pollens] Hives and Sneezing    Pollen Extract Hives and Sneezing     Watery Eyes    Prozac [Fluoxetine]       Immunizations:     Immunization History   Administered Date(s) Administered    COVID-19 PFIZER VACCINE 0 3 ML IM 03/29/2021, 04/19/2021, 08/24/2021    Hep A, adult 09/13/2002    Hep B, adult 09/13/2002    INFLUENZA 10/17/2011, 11/17/2015, 09/19/2019, 08/12/2021    Influenza Quadrivalent, 6-35 Months IM 10/21/2014, 11/17/2015    Influenza Split High Dose Preservative Free IM 09/19/2019    Influenza, high dose seasonal 0 7 mL 09/19/2019    Influenza, recombinant, quadrivalent,injectable, preservative free 10/09/2020    Pneumococcal Conjugate 13-Valent 05/02/2015    Pneumococcal Polysaccharide PPV23 09/13/2002, 07/08/2018    Tdap 11/17/2004      Health Maintenance:         Topic Date Due    DXA SCAN  08/07/2022    Colorectal Cancer Screening  05/07/2023    Hepatitis C Screening  Discontinued         Topic Date Due    Hepatitis B Vaccine (2 of 3 - Risk 3-dose series) 10/11/2002    Hepatitis A Vaccine (2 of 2 - Risk 2-dose series) 03/13/2003    DTaP,Tdap,and Td Vaccines (2 - Td or Tdap) 11/17/2014    COVID-19 Vaccine (4 - Booster for Pfizer series) 01/24/2022      Medicare Health Risk Assessment:     /74 (BP Location: Left arm, Patient Position: Sitting, Cuff Size: Standard)   Pulse 58   Temp 97 8 °F (36 6 °C) (Tympanic)   Resp 16   Ht 6' (1 829 m)   Wt 81 2 kg (179 lb)   SpO2 98%   BMI 24 28 kg/m²      Angela Troncoso is here for his Subsequent Wellness visit  Health Risk Assessment:   Patient rates overall health as fair  Patient feels that their physical health rating is slightly better  Patient is satisfied with their life  Eyesight was rated as slightly worse  Hearing was rated as slightly worse  Patient feels that their emotional and mental health rating is slightly better  Patients states they are never, rarely angry  Patient states they are sometimes unusually tired/fatigued   Pain experienced in the last 7 days has been some  Patient's pain rating has been 7/10  Patient states that he has experienced no weight loss or gain in last 6 months  Fall Risk Screening: In the past year, patient has experienced: history of falling in past year    Number of falls: 2 or more  Injured during fall?: Yes    Feels unsteady when standing or walking?: No    Worried about falling?: No      Home Safety:  Patient does not have trouble with stairs inside or outside of their home  Patient has working smoke alarms and has working carbon monoxide detector  Home safety hazards include: none  Nutrition:   Current diet is Regular  Medications:   Patient is currently taking over-the-counter supplements  OTC medications include: see medication list  Patient is able to manage medications  Activities of Daily Living (ADLs)/Instrumental Activities of Daily Living (IADLs):   Walk and transfer into and out of bed and chair?: Yes  Dress and groom yourself?: Yes    Bathe or shower yourself?: Yes    Feed yourself?  Yes  Do your laundry/housekeeping?: Yes  Manage your money, pay your bills and track your expenses?: Yes  Make your own meals?: Yes    Do your own shopping?: Yes    Previous Hospitalizations:   Any hospitalizations or ED visits within the last 12 months?: No      Advance Care Planning:   Living will: No    Advanced directive: No      Cognitive Screening:   Provider or family/friend/caregiver concerned regarding cognition?: No    PREVENTIVE SCREENINGS      Cardiovascular Screening:    General: Screening Not Indicated and History Lipid Disorder      Diabetes Screening:     General: Screening Not Indicated and History Diabetes      Colorectal Cancer Screening:     General: Screening Current      Prostate Cancer Screening:    General: Screening Current      Osteoporosis Screening:    General: Screening Current      Abdominal Aortic Aneurysm (AAA) Screening:    Risk factors include: age between 73-67 yo and tobacco use        Lung Cancer Screening:     General: Screening Not Indicated      Hepatitis C Screening:    General: Screening Not Indicated and History Hepatitis C    Screening, Brief Intervention, and Referral to Treatment (SBIRT)    Screening  Typical number of drinks in a day: 0  Typical number of drinks in a week: 0  Interpretation: Low risk drinking behavior      Single Item Drug Screening:  How often have you used an illegal drug (including marijuana) or a prescription medication for non-medical reasons in the past year? never    Single Item Drug Screen Score: 0  Interpretation: Negative screen for possible drug use disorder      Neyda Slater MD

## 2022-02-28 NOTE — PROGRESS NOTES
Assessment/Plan:    Diagnoses and all orders for this visit:    Liver transplanted ([facility] )    Impaired fasting glucose    Type 2 diabetes mellitus with other specified complication, without long-term current use of insulin ([facility] )    Other emphysema ([facility] )    Arteriosclerosis of coronary artery    Primary hypertension    Idiopathic peripheral neuropathy    Mixed hyperlipidemia    Tobacco abuse              Patient Instructions     No lab data for review    Status post liver transplant for alcoholic cirrhosis with history of hepatitis C-follow with Transplant Center, check labs    Coronary artery disease-stable without angina, continue aggressive risk factor modification    Hyperlipidemia on statin, check lipid profile    Hypertension stable on present regimen    Remote history of diabetes, stable with dietary modification    Peripheral neuropathy under care of Neurology    Tobacco cessation advised    Follow-up labs accordingly  Medicare Preventive Visit Patient Instructions  Thank you for completing your Welcome to Medicare Visit or Medicare Annual Wellness Visit today  Your next wellness visit will be due in one year (3/1/2023)  The screening/preventive services that you may require over the next 5-10 years are detailed below  Some tests may not apply to you based off risk factors and/or age  Screening tests ordered at today's visit but not completed yet may show as past due  Also, please note that scanned in results may not display below    Preventive Screenings:  Service Recommendations Previous Testing/Comments   Colorectal Cancer Screening  · Colonoscopy    · Fecal Occult Blood Test (FOBT)/Fecal Immunochemical Test (FIT)  · Fecal DNA/Cologuard Test  · Flexible Sigmoidoscopy Age: 54-65 years old   Colonoscopy: every 10 years (May be performed more frequently if at higher risk)  OR  FOBT/FIT: every 1 year  OR  Cologuard: every 3 years  OR  Sigmoidoscopy: every 5 years  Screening may be recommended earlier than age 48 if at higher risk for colorectal cancer  Also, an individualized decision between you and your healthcare provider will decide whether screening between the ages of 74-80 would be appropriate  Colonoscopy: 05/07/2013  FOBT/FIT: Not on file  Cologuard: Not on file  Sigmoidoscopy: Not on file    Screening Current     Prostate Cancer Screening Individualized decision between patient and health care provider in men between ages of 53-78   Medicare will cover every 12 months beginning on the day after your 50th birthday PSA: 0 5 ng/mL           Hepatitis C Screening Once for adults born between 1945 and 1965  More frequently in patients at high risk for Hepatitis C Hep C Antibody: Not on file    Screening Not Indicated  History Hepatitis C   Diabetes Screening 1-2 times per year if you're at risk for diabetes or have pre-diabetes Fasting glucose: 96 mg/dL   A1C: 5 8 %    Screening Not Indicated  History Diabetes   Cholesterol Screening Once every 5 years if you don't have a lipid disorder  May order more often based on risk factors  Lipid panel: 08/13/2021    Screening Not Indicated  History Lipid Disorder      Other Preventive Screenings Covered by Medicare:  1  Abdominal Aortic Aneurysm (AAA) Screening: covered once if your at risk  You're considered to be at risk if you have a family history of AAA or a male between the age of 73-68 who smoking at least 100 cigarettes in your lifetime  2  Lung Cancer Screening: covers low dose CT scan once per year if you meet all of the following conditions: (1) Age 50-69; (2) No signs or symptoms of lung cancer; (3) Current smoker or have quit smoking within the last 15 years; (4) You have a tobacco smoking history of at least 30 pack years (packs per day x number of years you smoked); (5) You get a written order from a healthcare provider    3  Glaucoma Screening: covered annually if you're considered high risk: (1) You have diabetes OR (2) Family history of glaucoma OR (3)  aged 48 and older OR (3)  American aged 72 and older  3  Osteoporosis Screening: covered every 2 years if you meet one of the following conditions: (1) Have a vertebral abnormality; (2) On glucocorticoid therapy for more than 3 months; (3) Have primary hyperparathyroidism; (4) On osteoporosis medications and need to assess response to drug therapy  5  HIV Screening: covered annually if you're between the age of 12-76  Also covered annually if you are younger than 13 and older than 72 with risk factors for HIV infection  For pregnant patients, it is covered up to 3 times per pregnancy  Immunizations:  Immunization Recommendations   Influenza Vaccine Annual influenza vaccination during flu season is recommended for all persons aged >= 6 months who do not have contraindications   Pneumococcal Vaccine (Prevnar and Pneumovax)  * Prevnar = PCV13  * Pneumovax = PPSV23 Adults 25-60 years old: 1-3 doses may be recommended based on certain risk factors  Adults 72 years old: Prevnar (PCV13) vaccine recommended followed by Pneumovax (PPSV23) vaccine  If already received PPSV23 since turning 65, then PCV13 recommended at least one year after PPSV23 dose  Hepatitis B Vaccine 3 dose series if at intermediate or high risk (ex: diabetes, end stage renal disease, liver disease)   Tetanus (Td) Vaccine - COST NOT COVERED BY MEDICARE PART B Following completion of primary series, a booster dose should be given every 10 years to maintain immunity against tetanus  Td may also be given as tetanus wound prophylaxis  Tdap Vaccine - COST NOT COVERED BY MEDICARE PART B Recommended at least once for all adults  For pregnant patients, recommended with each pregnancy     Shingles Vaccine (Shingrix) - COST NOT COVERED BY MEDICARE PART B  2 shot series recommended in those aged 48 and above     Health Maintenance Due:      Topic Date Due    DXA SCAN  08/07/2022    Colorectal Cancer Screening  05/07/2023    Hepatitis C Screening  Discontinued     Immunizations Due:      Topic Date Due    Hepatitis B Vaccine (2 of 3 - Risk 3-dose series) 10/11/2002    Hepatitis A Vaccine (2 of 2 - Risk 2-dose series) 03/13/2003    DTaP,Tdap,and Td Vaccines (2 - Td or Tdap) 11/17/2014    COVID-19 Vaccine (4 - Booster for Pfizer series) 01/24/2022     Advance Directives   What are advance directives? Advance directives are legal documents that state your wishes and plans for medical care  These plans are made ahead of time in case you lose your ability to make decisions for yourself  Advance directives can apply to any medical decision, such as the treatments you want, and if you want to donate organs  What are the types of advance directives? There are many types of advance directives, and each state has rules about how to use them  You may choose a combination of any of the following:  · Living will: This is a written record of the treatment you want  You can also choose which treatments you do not want, which to limit, and which to stop at a certain time  This includes surgery, medicine, IV fluid, and tube feedings  · Durable power of  for healthcare [facility]): This is a written record that states who you want to make healthcare choices for you when you are unable to make them for yourself  This person, called a proxy, is usually a family member or a friend  You may choose more than 1 proxy  · Do not resuscitate (DNR) order:  A DNR order is used in case your heart stops beating or you stop breathing  It is a request not to have certain forms of treatment, such as CPR  A DNR order may be included in other types of advance directives  · Medical directive: This covers the care that you want if you are in a coma, near death, or unable to make decisions for yourself  You can list the treatments you want for each condition   Treatment may include pain medicine, surgery, blood transfusions, dialysis, IV or tube feedings, and a ventilator (breathing machine)  · Values history: This document has questions about your views, beliefs, and how you feel and think about life  This information can help others choose the care that you would choose  Why are advance directives important? An advance directive helps you control your care  Although spoken wishes may be used, it is better to have your wishes written down  Spoken wishes can be misunderstood, or not followed  Treatments may be given even if you do not want them  An advance directive may make it easier for your family to make difficult choices about your care  Fall Prevention    Fall prevention  includes ways to make your home and other areas safer  It also includes ways you can move more carefully to prevent a fall  Health conditions that cause changes in your blood pressure, vision, or muscle strength and coordination may increase your risk for falls  Medicines may also increase your risk for falls if they make you dizzy, weak, or sleepy  Fall prevention tips:   · Stand or sit up slowly  · Use assistive devices as directed  · Wear shoes that fit well and have soles that   · Wear a personal alarm  · Stay active  · Manage your medical conditions  Home Safety Tips:  · Add items to prevent falls in the bathroom  · Keep paths clear  · Install bright lights in your home  · Keep items you use often on shelves within reach  · Paint or place reflective tape on the edges of your stairs  Cigarette Smoking and Your Health   Risks to your health if you smoke:  Nicotine and other chemicals found in tobacco damage every cell in your body  Even if you are a light smoker, you have an increased risk for cancer, heart disease, and lung disease  If you are pregnant or have diabetes, smoking increases your risk for complications     Benefits to your health if you stop smoking:   · You decrease respiratory symptoms such as coughing, wheezing, and shortness of breath  · You reduce your risk for cancers of the lung, mouth, throat, kidney, bladder, pancreas, stomach, and cervix  If you already have cancer, you increase the benefits of chemotherapy  You also reduce your risk for cancer returning or a second cancer from developing  · You reduce your risk for heart disease, blood clots, heart attack, and stroke  · You reduce your risk for lung infections, and diseases such as pneumonia, asthma, chronic bronchitis, and emphysema  · Your circulation improves  More oxygen can be delivered to your body  If you have diabetes, you lower your risk for complications, such as kidney, artery, and eye diseases  You also lower your risk for nerve damage  Nerve damage can lead to amputations, poor vision, and blindness  · You improve your body's ability to heal and to fight infections  For more information and support to stop smoking:   · [employer]  Phone: 5- 104 - [phone]  Web Address: [employer]  [facility] Information is for End User's use only and may not be sold, redistributed or otherwise used for commercial purposes  All illustrations and images included in CareNotes® are the copyrighted property of A D A M , Inc  or [address] [place] [address] [facility]      Subjective:      Patient ID: Edie Donaldson is a 69 y o  male    Pt is here for 6 month follow up and AWV  Didn't do labs  Hasn't seen transplant team since '19 d/t Covid  Phone f/u  Didn't renew his medical [employer] card, getting off street  PN- improved w/ increased neurontin per neuro      Still smoking, no cp/sob, using O2 hs  HTN/HPL-taking rx as directed, No home bps          Current Outpatient Medications:     aspirin 81 MG tablet, Take 1 tablet by mouth daily  , Disp: , Rfl:     atenolol (TENORMIN) 100 mg tablet, Take 1 tablet (100 mg total) by mouth 2 (two) times a day, Disp: 180 tablet, Rfl: 3    furosemide (LASIX) 20 mg tablet, Take 1 tablet (20 mg total) by mouth daily, Disp: 90 tablet, Rfl: 3    gabapentin (NEURONTIN) 600 MG tablet, 1 5 p o  t i d  as directed, Disp: 135 tablet, Rfl: 5    Klor-Con M20 20 MEQ tablet, TAKE 1 TABLET (20 MEQ TOTAL) BY MOUTH EVERY OTHER DAY, Disp: 90 tablet, Rfl: 3    NON FORMULARY, Medical Marijuana, Disp: , Rfl:     pantoprazole (PROTONIX) 40 mg tablet, Take 1 tablet (40 mg total) by mouth daily, Disp: 90 tablet, Rfl: 3    pravastatin (PRAVACHOL) 20 mg tablet, TAKE ONE TABLET BY MOUTH EVERY DAY, Disp: 173 tablet, Rfl: 0    tacrolimus (PROGRAF) 1 mg capsule, Take by mouth , Disp: , Rfl:     No results found for this or any previous visit (from the past 1008 hour(s))  The following portions of the patient's history were reviewed and updated as appropriate: allergies, current medications, past family history, past medical history, past social history, past surgical history and problem list      Review of Systems   Constitutional: Negative for appetite change, chills, diaphoresis, fatigue, fever and unexpected weight change  HENT: Negative for congestion, hearing loss and rhinorrhea  Eyes: Negative for visual disturbance  Respiratory: Negative for cough, chest tightness, shortness of breath and wheezing  Cardiovascular: Negative for chest pain, palpitations and leg swelling  Gastrointestinal: Negative for abdominal pain and blood in stool  Endocrine: Negative for cold intolerance, heat intolerance, polydipsia and polyuria  Genitourinary: Negative for difficulty urinating, dysuria, frequency and urgency  Musculoskeletal: Negative for arthralgias and myalgias  Skin: Negative for rash  Neurological: Positive for numbness  Negative for dizziness, weakness, light-headedness and headaches  Hematological: Does not bruise/bleed easily  Psychiatric/Behavioral: Negative for dysphoric mood and sleep disturbance           Objective:      Vitals:    02/28/22 1325   BP: 138/74   Pulse: 58   Resp: 16   Temp: 97 8 °F (36 6 °C)   SpO2: 98% Physical Exam  Constitutional:       Appearance: He is well-developed  HENT:      Head: Normocephalic and atraumatic  Nose: Nose normal    Eyes:      General: No scleral icterus  Conjunctiva/sclera: Conjunctivae normal       Pupils: Pupils are equal, round, and reactive to light  Neck:      Thyroid: No thyromegaly  Vascular: No JVD  Trachea: No tracheal deviation  Cardiovascular:      Rate and Rhythm: Normal rate and regular rhythm  Pulses: Pulses are weak  Dorsalis pedis pulses are 1+ on the right side and 1+ on the left side  Posterior tibial pulses are 1+ on the right side and 1+ on the left side  Heart sounds: No murmur heard  No friction rub  No gallop  Pulmonary:      Effort: Pulmonary effort is normal  No respiratory distress  Breath sounds: Normal breath sounds  No wheezing or rales  Musculoskeletal:         General: No deformity  Cervical back: Normal range of motion and neck supple  Feet:      Right foot:      Skin integrity: No ulcer, skin breakdown, erythema, warmth, callus or dry skin  Left foot:      Skin integrity: No ulcer, skin breakdown, erythema, warmth, callus or dry skin  Lymphadenopathy:      Cervical: No cervical adenopathy  Skin:     General: Skin is warm and dry  Coloration: Skin is not pale  Findings: No erythema or rash  Neurological:      Mental Status: He is alert and oriented to person, place, and time  Cranial Nerves: No cranial nerve deficit  Psychiatric:         Behavior: Behavior normal          Thought Content: Thought content normal          Judgment: Judgment normal      Diabetic Foot Exam    Patient's shoes and socks removed  Right Foot/Ankle   Right Foot Inspection  Skin Exam: skin normal and skin intact  No dry skin, no warmth, no callus, no erythema, no maceration, no abnormal color, no pre-ulcer, no ulcer and no callus  Toe Exam: ROM and strength within normal limits  Sensory   Proprioception: diminished  Monofilament testing: absent    Vascular  Capillary refills: < 3 seconds  The right DP pulse is 1+  The right PT pulse is 1+  Left Foot/Ankle  Left Foot Inspection  Skin Exam: skin normal and skin intact  No dry skin, no warmth, no erythema, no maceration, normal color, no pre-ulcer, no ulcer and no callus  Toe Exam: ROM and strength within normal limits  Sensory   Proprioception: diminished  Monofilament testing: absent    Vascular  Capillary refills: < 3 seconds  The left DP pulse is 1+  The left PT pulse is 1+       Assign Risk Category  No deformity present  Loss of protective sensation  Weak pulses  Risk: 2

## 2022-03-04 ENCOUNTER — APPOINTMENT (OUTPATIENT)
Dept: LAB | Facility: CLINIC | Age: 67
End: 2022-03-04
Payer: COMMERCIAL

## 2022-03-04 DIAGNOSIS — R79.1 ABNORMAL COAGULATION PROFILE: ICD-10-CM

## 2022-03-04 DIAGNOSIS — D68.9 BLOOD CLOTTING DISORDER (HCC): ICD-10-CM

## 2022-03-04 DIAGNOSIS — Z94.4 LIVER REPLACED BY TRANSPLANT (HCC): ICD-10-CM

## 2022-03-04 LAB
GGT SERPL-CCNC: 104 U/L (ref 5–85)
INR PPP: 1.06 (ref 0.84–1.19)
MAGNESIUM SERPL-MCNC: 1.9 MG/DL (ref 1.6–2.6)
PROTHROMBIN TIME: 13.4 SECONDS (ref 11.6–14.5)
PTH-INTACT SERPL-MCNC: 162.9 PG/ML (ref 18.4–80.1)

## 2022-03-04 PROCEDURE — 84166 PROTEIN E-PHORESIS/URINE/CSF: CPT | Performed by: SPECIALIST

## 2022-03-04 PROCEDURE — 82977 ASSAY OF GGT: CPT

## 2022-03-04 PROCEDURE — 83735 ASSAY OF MAGNESIUM: CPT

## 2022-03-04 PROCEDURE — 80197 ASSAY OF TACROLIMUS: CPT

## 2022-03-04 PROCEDURE — 84165 PROTEIN E-PHORESIS SERUM: CPT | Performed by: SPECIALIST

## 2022-03-04 PROCEDURE — 83970 ASSAY OF PARATHORMONE: CPT

## 2022-03-04 PROCEDURE — 85610 PROTHROMBIN TIME: CPT

## 2022-03-05 LAB — TACROLIMUS BLD-MCNC: 1.8 NG/ML (ref 2–20)

## 2022-03-14 ENCOUNTER — TELEPHONE (OUTPATIENT)
Dept: INTERNAL MEDICINE CLINIC | Facility: CLINIC | Age: 67
End: 2022-03-14

## 2022-03-14 ENCOUNTER — OFFICE VISIT (OUTPATIENT)
Dept: INTERNAL MEDICINE CLINIC | Facility: CLINIC | Age: 67
End: 2022-03-14
Payer: COMMERCIAL

## 2022-03-14 VITALS
HEART RATE: 68 BPM | WEIGHT: 174 LBS | RESPIRATION RATE: 16 BRPM | SYSTOLIC BLOOD PRESSURE: 128 MMHG | BODY MASS INDEX: 23.57 KG/M2 | DIASTOLIC BLOOD PRESSURE: 76 MMHG | HEIGHT: 72 IN

## 2022-03-14 DIAGNOSIS — C22.1 MALIGNANT NEOPLASM OF INTRAHEPATIC BILE DUCTS (HCC): ICD-10-CM

## 2022-03-14 DIAGNOSIS — E11.69 TYPE 2 DIABETES MELLITUS WITH OTHER SPECIFIED COMPLICATION, WITHOUT LONG-TERM CURRENT USE OF INSULIN (HCC): Primary | ICD-10-CM

## 2022-03-14 DIAGNOSIS — I50.32 CHRONIC DIASTOLIC HEART FAILURE (HCC): ICD-10-CM

## 2022-03-14 DIAGNOSIS — D68.9 BLOOD CLOTTING DISORDER (HCC): ICD-10-CM

## 2022-03-14 DIAGNOSIS — A69.23 LYME ARTHRITIS (HCC): ICD-10-CM

## 2022-03-14 DIAGNOSIS — D84.9 IMMUNOSUPPRESSION (HCC): ICD-10-CM

## 2022-03-14 DIAGNOSIS — F33.9 DEPRESSION, RECURRENT (HCC): ICD-10-CM

## 2022-03-14 DIAGNOSIS — L98.9 SKIN LESION: ICD-10-CM

## 2022-03-14 DIAGNOSIS — N18.31 STAGE 3A CHRONIC KIDNEY DISEASE (HCC): ICD-10-CM

## 2022-03-14 DIAGNOSIS — B18.2 HEPATITIS C VIRUS CARRIER STATE (HCC): ICD-10-CM

## 2022-03-14 DIAGNOSIS — I77.89 OTHER SPECIFIED DISORDERS OF ARTERIES AND ARTERIOLES (HCC): ICD-10-CM

## 2022-03-14 PROCEDURE — 99213 OFFICE O/P EST LOW 20 MIN: CPT | Performed by: NURSE PRACTITIONER

## 2022-03-14 NOTE — PROGRESS NOTES
Assessment/Plan:    Patient Instructions   reoccuring lesion to tip of nose skin cancer vs herpes pt is on immunosuppressive therapy secondary to liver transplant  Will try to expedite dermatology appt for eval/treatment ? 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)    Immunosuppression (HCC)    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HCC)    Depression, recurrent ([facility] )    Other specified disorders of arteries and arterioles ([facility] [facility] )    Blood clotting disorder ([facility] [facility] )    Hepatitis C virus carrier state ([facility] )         Subjective:      Patient ID: Vinicius Santiago is a 69 y o  male    Pt has been developing a reoccuring lesion to tip of nose, its seems to come and go over the last 1 year  It never really goes away but just has days when it looks better or worse  No pain or tenderness  It doesn't look like blisters or ooze  No headache or burning or pain associated w/ it this time its been worse for the last several days   He doesn't pick at it , it doesn't bleed        Current Outpatient Medications:     atenolol (TENORMIN) 100 mg tablet, Take 1 tablet (100 mg total) by mouth 2 (two) times a day, Disp: 180 tablet, Rfl: 3    furosemide (LASIX) 20 mg tablet, Take 1 tablet (20 mg total) by mouth daily, Disp: 90 tablet, Rfl: 3    gabapentin (NEURONTIN) 600 MG tablet, 1 5 p o  t i d  as directed, Disp: 135 tablet, Rfl: 5    Klor-Con M20 20 MEQ tablet, TAKE 1 TABLET (20 MEQ TOTAL) BY MOUTH EVERY OTHER DAY, Disp: 90 tablet, Rfl: 3    NON FORMULARY, Medical Marijuana, Disp: , Rfl:     pantoprazole (PROTONIX) 40 mg tablet, Take 1 tablet (40 mg total) by mouth daily, Disp: 90 tablet, Rfl: 3    pravastatin (PRAVACHOL) 20 mg tablet, TAKE ONE TABLET BY MOUTH EVERY DAY, Disp: 173 tablet, Rfl: 0    tacrolimus (PROGRAF) 1 mg capsule, Take by mouth , Disp: , Rfl:     aspirin 81 MG tablet, Take 1 tablet by mouth daily   (Patient not taking: Reported on 3/14/2022 ), Disp: , Rfl:     Recent Results (from the past 1008 hour(s))   Protein electrophoresis, urine    Collection Time: 03/04/22 11:07 AM   Result Value Ref Range    Urine Protein 184 0 (H) 2 0 - 17 5 mg/dL    Albumin ELP, Urine 75 3 %    Alpha 1, Urine 4 9 %    Alpha 2, Urine 5 0 %    Beta, Urine 7 1 %    Gamma Globulin, Urine 7 7 %    UPEP Interp See Comment    Microalbumin / creatinine urine ratio    Collection Time: 03/04/22 11:07 AM   Result Value Ref Range    Creatinine, Ur 298 0 mg/dL    Microalbum  ,U,Random 1,210 0 (H) 0 0 - 20 0 mg/L    Microalb Creat Ratio 406 (H) 0 - 30 mg/g creatinine   CBC and differential    Collection Time: 03/04/22 11:07 AM   Result Value Ref Range    WBC 7 15 4 31 - 10 16 Thousand/uL    RBC 4 58 3 88 - 5 62 Million/uL    Hemoglobin 15 0 12 0 - 17 0 g/dL    Hematocrit 43 7 36 5 - 49 3 %    MCV 95 82 - 98 fL    MCH 32 8 26 8 - 34 3 pg    MCHC 34 3 31 4 - 37 4 g/dL    RDW 12 5 11 6 - 15 1 %    MPV 11 1 8 9 - 12 7 fL    Platelets 296 870 - 639 Thousands/uL    nRBC 0 /100 WBCs    Neutrophils Relative 69 43 - 75 %    Immat GRANS % 1 0 - 2 %    Lymphocytes Relative 19 14 - 44 %    Monocytes Relative 8 4 - 12 %    Eosinophils Relative 2 0 - 6 %    Basophils Relative 1 0 - 1 %    Neutrophils Absolute 5 03 1 85 - 7 62 Thousands/µL    Immature Grans Absolute 0 04 0 00 - 0 20 Thousand/uL    Lymphocytes Absolute 1 37 0 60 - 4 47 Thousands/µL    Monocytes Absolute 0 54 0 17 - 1 22 Thousand/µL    Eosinophils Absolute 0 11 0 00 - 0 61 Thousand/µL    Basophils Absolute 0 06 0 00 - 0 10 Thousands/µL   Comprehensive metabolic panel    Collection Time: 03/04/22 11:07 AM Result Value Ref Range    Sodium 134 (L) 136 - 145 mmol/L    Potassium 4 4 3 5 - 5 3 mmol/L    Chloride 102 100 - 108 mmol/L    CO2 27 21 - 32 mmol/L    ANION GAP 5 4 - 13 mmol/L    BUN 17 5 - 25 mg/dL    Creatinine 1 41 (H) 0 60 - 1 30 mg/dL    Glucose 143 (H) 65 - 140 mg/dL    Calcium 9 8 8 3 - 10 1 mg/dL    AST 26 5 - 45 U/L    ALT 32 12 - 78 U/L    Alkaline Phosphatase 92 46 - 116 U/L    Total Protein 8 0 6 4 - 8 2 g/dL    Albumin 4 1 3 5 - 5 0 g/dL    Total Bilirubin 0 73 0 20 - 1 00 mg/dL    eGFR 50 ml/min/1 73sq m   Lipid panel    Collection Time: 03/04/22 11:07 AM   Result Value Ref Range    Cholesterol 159 See Comment mg/dL    Triglycerides 158 (H) See Comment mg/dL    HDL, Direct 39 (L) >=40 mg/dL    LDL Calculated 88 0 - 100 mg/dL    Non-HDL-Chol (CHOL-HDL) 120 mg/dl   Hemoglobin A1C    Collection Time: 03/04/22 11:07 AM   Result Value Ref Range    Hemoglobin A1C 6 3 (H) Normal 3 8-5 6%; PreDiabetic 5 7-6 4%;  Diabetic >=6 5%; Glycemic control for adults with diabetes <7 0% %     mg/dl   TSH, 3rd generation with Free T4 reflex    Collection Time: 03/04/22 11:07 AM   Result Value Ref Range    TSH 3RD GENERATON 1 570 0 358 - 3 740 uIU/mL   Immunoglobulin free LT chains blood    Collection Time: 03/04/22 11:07 AM   Result Value Ref Range    Ig Kappa Free Light Chain 49 5 (H) 3 3 - 19 4 mg/L    Ig Lambda Free Light Chain 38 2 (H) 5 7 - 26 3 mg/L    Kappa/Lambda FluidC Ratio 1 30 0 26 - 1 65   Protein electrophoresis, serum    Collection Time: 03/04/22 11:07 AM   Result Value Ref Range    A/G Ratio 1 48 1 10 - 1 80    Albumin Electrophoresis 59 7 52 0 - 65 0 %    Albumin CONC 4 60 3 50 - 5 00 g/dl    Alpha 1 4 7 2 5 - 5 0 %    ALPHA 1 CONC 0 36 0 10 - 0 40 g/dL    Alpha 2 10 7 7 0 - 13 0 %    ALPHA 2 CONC 0 82 0 40 - 1 20 g/dL    Beta-1 5 4 5 0 - 13 0 %    BETA 1 CONC 0 42 0 40 - 0 80 g/dL    Beta-2 5 3 2 0 - 8 0 %    BETA 2 CONC 0 41 0 20 - 0 50 g/dL    Gamma Globulin 14 2 12 0 - 22 0 %    GAMMA CONC 1  09 0 50 - 1 60 g/dL    Total Protein 7 7 6 4 - 8 2 g/dL    SPEP Interpretation See Comment    Sedimentation rate, automated    Collection Time: 03/04/22 11:07 AM   Result Value Ref Range    Sed Rate 27 (H) 0 - 19 mm/hour   Uric acid    Collection Time: 03/04/22 11:07 AM   Result Value Ref Range    Uric Acid 8 8 (H) 4 2 - 8 0 mg/dL   Magnesium    Collection Time: 03/04/22 11:07 AM   Result Value Ref Range    Magnesium 1 9 1 6 - 2 6 mg/dL   Protime-INR    Collection Time: 03/04/22 11:07 AM   Result Value Ref Range    Protime 13 4 11 6 - 14 5 seconds    INR 1 06 0 84 - 1 19   Gamma GT    Collection Time: 03/04/22 11:07 AM   Result Value Ref Range     (H) 5 - 85 U/L   Tacrolimus level    Collection Time: 03/04/22 11:07 AM   Result Value Ref Range    TACROLIMUS 1 8 (L) 2 0 - 20 0 ng/mL   PTH, intact    Collection Time: 03/04/22 11:07 AM   Result Value Ref Range     9 (H) 18 4 - 80 1 pg/mL       The following portions of the patient's history were reviewed and updated as appropriate: allergies, current medications, past family history, past medical history, past social history, past surgical history and problem list      Review of Systems   Constitutional: Negative for appetite change, chills, diaphoresis, fatigue, fever and unexpected weight change  HENT: Negative for postnasal drip and sneezing  Eyes: Negative for visual disturbance  Respiratory: Negative for chest tightness and shortness of breath  Cardiovascular: Negative for chest pain, palpitations and leg swelling  Gastrointestinal: Negative for abdominal pain and blood in stool  Endocrine: Negative for cold intolerance, heat intolerance, polydipsia, polyphagia and polyuria  Genitourinary: Negative for difficulty urinating, dysuria, frequency and urgency  Musculoskeletal: Negative for arthralgias and myalgias  Skin: Positive for rash  Negative for wound     Neurological: Negative for dizziness, weakness, light-headedness and headaches  Hematological: Negative for adenopathy  Psychiatric/Behavioral: Negative for confusion, dysphoric mood and sleep disturbance  The patient is not nervous/anxious  Objective:      /76 (BP Location: Left arm, Patient Position: Sitting, Cuff Size: Standard)   Pulse 68   Resp 16   Ht 6' (1 829 m)   Wt 78 9 kg (174 lb)   BMI 23 60 kg/m²        Physical Exam  Constitutional:       Appearance: He is well-developed  HENT:      Head:        Nose:        Comments: Erythema to tip of nose  Pulmonary:      Effort: Pulmonary effort is normal    Musculoskeletal:      Cervical back: Normal range of motion  Neurological:      Mental Status: He is alert  Psychiatric:         Behavior: Behavior is cooperative

## 2022-03-14 NOTE — PATIENT INSTRUCTIONS
reoccuring lesion to tip of nose skin cancer vs herpes pt is on immunosuppressive therapy secondary to liver transplant  Will try to expedite dermatology appt for eval/treatment ?  Basal cell vs herpes less likely herpes

## 2022-03-15 ENCOUNTER — OFFICE VISIT (OUTPATIENT)
Dept: DERMATOLOGY | Facility: CLINIC | Age: 67
End: 2022-03-15
Payer: COMMERCIAL

## 2022-03-15 VITALS — BODY MASS INDEX: 22.93 KG/M2 | HEIGHT: 73 IN | WEIGHT: 173 LBS

## 2022-03-15 DIAGNOSIS — Z13.89 SCREENING FOR SKIN CONDITION: ICD-10-CM

## 2022-03-15 DIAGNOSIS — L57.0 ACTINIC KERATOSIS: Primary | ICD-10-CM

## 2022-03-15 DIAGNOSIS — L82.1 SEBORRHEIC KERATOSIS: ICD-10-CM

## 2022-03-15 DIAGNOSIS — Z92.25 HISTORY OF IMMUNOSUPPRESSIVE THERAPY: ICD-10-CM

## 2022-03-15 PROCEDURE — 99213 OFFICE O/P EST LOW 20 MIN: CPT | Performed by: DERMATOLOGY

## 2022-03-15 PROCEDURE — 17000 DESTRUCT PREMALG LESION: CPT | Performed by: DERMATOLOGY

## 2022-03-15 NOTE — PROGRESS NOTES
[facility] DERMATOLOGY  [address] [place] [address]  [place] [address]  [phone]  [phone]     MRN: [id number] : 1955  Encounter: [id number]  Patient Information: Tana Lee  Chief complaint:  Yearly checkup    History of present illness:  55-year-old male with history of liver transplant presents for overall checkup is specifically regarding lesion on his nose patient has not been seen by me for almost 3 years    Past Medical History:   Diagnosis Date    Abnormal electrocardiogram     Abnormal findings on radiological examination of gastrointestinal tract     Abnormal liver enzymes     Acute hepatitis C     Adrenal disorder (HCC)     Aneurysm of unspecified site ([address] )     Benign neoplasm of skin     Bronchopneumonia     Chronic hepatitis C (HCC)     Chronic obstructive asthma (HCC)     Cirrhosis (HCC)     Colon, diverticulosis     COPD (chronic obstructive pulmonary disease) (HCC)     Depression     Diabetes mellitus (HCC)     Drug dependence, continuous abuse ([facility] [facility] )     Hyperlipidemia     Hypertension     Laennec's cirrhosis (alcoholic) (HCC)     Malabsorption syndrome     Mitral valve disorder     Murmur     Nonspecific abnormal finding on cardiac evaluation     Peripheral vascular disease (HCC)     Pleural effusion     Pneumonia     Rectal hemorrhage     Renal failure     Respiratory abnormality     Restrictive lung disease     lung disease other not elsewhere class     Testicular dysfunction     testicular hypfunction other     Tricuspid valve disorders, non-rheumatic     Type 2 diabetes mellitus (HCC)     uncomplicated controlled      Ventral hernia      Past Surgical History:   Procedure Laterality Date    CHOLECYSTECTOMY      GALLBLADDER SURGERY      HEPATITIS B SURFACE AB QN,LIVER TRANSPLANT (HISTORICAL)      HERNIA REPAIR      JOINT REPLACEMENT      L knee 3/10/20    KNEE SURGERY Left 2020    WOUND DEBRIDEMENT Left 4/10/2020    Procedure: EXCISIONAL DEBRIDEMENT;  Surgeon: Louise Coker MD;  Location: [facility];  Service: General     Social History   Social History     Substance and Sexual Activity   Alcohol Use No    Comment: no alcohol use      Social History     Substance and Sexual Activity   Drug Use Yes    Types: Marijuana    Comment: medical marijuana     Social History     Tobacco Use   Smoking Status Current Every Day Smoker    Packs/day: 0 25    Years: 40 00    Pack years: 10 00    Types: Cigarettes   Smokeless Tobacco Never Used   Tobacco Comment    5 a day      Family History   Problem Relation Age of Onset    Lung cancer Mother         overlapping sites of lung unspecified laterality     Heart disease Father         cardiac disorder    Stroke Father     Arthritis Family     Cancer Family     Liver disease Family         chronic    Coronary artery disease Family     Diabetes Family     Hypertension Brother     Hypertension Brother     No Known Problems Brother     No Known Problems Brother      Meds/Allergies   Allergies   Allergen Reactions    Dust Mite Extract Cough, Eye Swelling, Itching and Wheezing    Muscle Relief  [Capsaicin] Confusion, Headache, Lightheadedness, Palpitations, Tinnitus and Vomiting    Other      Muscle relaxer's causes vomiting and nausea     Grass Extracts [Gramineae Pollens] Hives and Sneezing    Pollen Extract Hives and Sneezing     Watery Eyes    Prozac [Fluoxetine]        Meds:  Prior to Admission medications    Medication Sig Start Date End Date Taking?  Authorizing Provider   atenolol (TENORMIN) 100 mg tablet Take 1 tablet (100 mg total) by mouth 2 (two) times a day 8/10/21  Yes Zaria Paris MD   furosemide (LASIX) 20 mg tablet Take 1 tablet (20 mg total) by mouth daily 11/18/21  Yes JOSE Walker   gabapentin (NEURONTIN) 600 MG tablet 1 5 p o  t i d  as directed 10/29/21  Yes Jeffrey Hernandez MD   Klor-Con M20 20 MEQ tablet TAKE 1 TABLET (20 MEQ TOTAL) BY MOUTH EVERY OTHER DAY 6/28/21  Yes Melissa Vega MD   NON FORMULARY Medical Marijuana   Yes Historical Provider, MD   pantoprazole (PROTONIX) 40 mg tablet Take 1 tablet (40 mg total) by mouth daily 8/10/21  Yes Flory Kidd MD   pravastatin (PRAVACHOL) 20 mg tablet TAKE ONE TABLET BY MOUTH EVERY DAY 2/26/22  Yes Lestine Scheuermann, CRNP   tacrolimus (PROGRAF) 1 mg capsule Take by mouth  3/4/14  Yes Historical Provider, MD   aspirin 81 MG tablet Take 1 tablet by mouth daily    Patient not taking: Reported on 3/14/2022  12/27/13   Historical Provider, MD       Subjective:     Review of Systems:    General: negative for - chills, fatigue, fever,  weight gain or weight loss  Psychological: negative for - anxiety, behavioral disorder, concentration difficulties, decreased libido, depression, irritability, memory difficulties, mood swings, sleep disturbances or suicidal ideation  ENT: negative for - hearing difficulties , nasal congestion, nasal discharge, oral lesions, sinus pain, sneezing, sore throat  Allergy and Immunology: negative for - hives, insect bite sensitivity,  Hematological and Lymphatic: negative for - bleeding problems, blood clots,bruising, swollen lymph nodes  Endocrine: negative for - hair pattern changes, hot flashes, malaise/lethargy, mood swings, palpitations, polydipsia/polyuria, skin changes, temperature intolerance or unexpected weight change  Respiratory: negative for - cough, hemoptysis, orthopnea, shortness of breath, or wheezing  Cardiovascular: negative for - chest pain, dyspnea on exertion, edema,  Gastrointestinal: negative for - abdominal pain, nausea/vomiting  Genito-Urinary: negative for - dysuria, incontinence, irregular/heavy menses or urinary frequency/urgency  Musculoskeletal: negative for - gait disturbance, joint pain, joint stiffness, joint swelling, muscle pain, muscular weakness  Dermatological:  As in HPI  Neurological: negative for confusion, dizziness, headaches, impaired coordination/balance, memory loss, numbness/tingling, seizures, speech problems, tremors or weakness       Objective:   Ht 6' 1" (1 854 m)   Wt 78 5 kg (173 lb)   BMI 22 82 kg/m²     Physical Exam:    General Appearance:    Alert, cooperative, no distress   Head:    Normocephalic, without obvious abnormality, atraumatic           Skin:   A full skin exam was performed including scalp, head scalp, eyes, ears, nose, lips, neck, chest, axilla, abdomen, back, buttocks, bilateral upper extremities, bilateral lower extremities, hands, feet, fingers, toes, fingernails, and toenails area erythema scaling skin noted on the nasal tip is below normal keratotic papules greasy stuck appearance nothing else remarkable noted on exam  Physical Exam  HENT:      Head:          Cryotherapy Procedure Note    Pre-operative Diagnosis: actinic keratosis    Plan:  1  Instructed to keep the area dry and clean thereafter  Apply petrolatum if area gets crusty  2  Recommended that the patient use acetaminophen  as needed for pain  Locations:  Nasal tip    Indications: Destruction of actinic keratosis times 1    Patient informed of risks (permanent scarring, infection, light or dark discoloration, bleeding, infection, weakness, numbness and recurrence of the lesion) and benefits of the procedure and verbal informed consent obtained  The areas are treated with liquid nitrogen therapy, frozen until ice ball extended 2 mm beyond lesion, allowed to thaw, and treated again  The patient tolerated procedure well  The patient was instructed on post-op care, warned that there may be blister formation, redness and pain  Recommend OTC analgesia as needed for pain  Condition:  Stable    Complications:  none  Assessment:     1  Actinic keratosis     2  Seborrheic keratosis     3  Screening for skin condition     4   History of immunosuppressive therapy           Plan:   Actinic Keratosis:  Patient advised lesions are precancers  These should resolve with cryosurgery if not to let us know sun block recommended  Seborrheic Keratosis  Patient reasurred these are normal growths we acquire with age no treatment needed  Screening for Dermatologic Disorders: Nothing else of concern noted on complete exam follow up in 1 year   History of immunosuppressive therapy patient advised cause of the medications that he is on he is at higher risk for potential skin cancers  If any changes growths have occurred patient should notify us  Also patient should be extremely careful with sun exposure  Yearly follow-up recommended  Mary Gomez MD  3/15/2022,9:58 AM    Portions of the record may have been created with voice recognition software   Occasional wrong word or "sound a like" substitutions may have occurred due to the inherent limitations of voice recognition software   Read the chart carefully and recognize, using context, where substitutions have occurred

## 2022-03-15 NOTE — PATIENT INSTRUCTIONS
Actinic Keratosis:  Patient advised lesions are precancers  These should resolve with cryosurgery if not to let us know sun block recommended  Seborrheic Keratosis  Patient reasurred these are normal growths we acquire with age no treatment needed  Screening for Dermatologic Disorders: Nothing else of concern noted on complete exam follow up in 1 year   History of immunosuppressive therapy patient advised cause of the medications that he is on he is at higher risk for potential skin cancers  If any changes growths have occurred patient should notify us  Also patient should be extremely careful with sun exposure  Yearly follow-up recommended  Treatment with Cryotherapy    The doctor has treated your skin with nitrogen, which is 320 degrees Fahrenheit below zero  He has given the treated area "frostbite "    Stinging should subside within a few hours  You can take Tylenol for pain, if needed  Over the next few days, it is normal if the area becomes reddened, a blood blister, or swollen with fluid  If the lesion treated was near the eye - you could get a swollen eye over the next few days  Do not panic! This is all temporary, and will resolve with time  There is no special treatment - just keep the area clean  Makeup and BandAids can be used, if preferred  When the area starts to dry up and peel off, using Vaseline can help healing  It usually takes up to a month for it to heal   Some lesions are recurrent and may require repeat treatments  If a lesion has not healed in one month, please don't hesitate to contact us  If you have any further questions that are not answered here, please call us  [phone]    Thank you for allowing us to care for you

## 2022-04-18 ENCOUNTER — APPOINTMENT (OUTPATIENT)
Dept: LAB | Facility: CLINIC | Age: 67
End: 2022-04-18
Payer: COMMERCIAL

## 2022-04-18 DIAGNOSIS — D68.9 POSTPARTUM COAGULATION DEFECTS, WITH DELIVERY (HCC): ICD-10-CM

## 2022-04-18 DIAGNOSIS — Z94.4 LIVER REPLACED BY TRANSPLANT (HCC): ICD-10-CM

## 2022-04-18 DIAGNOSIS — Z79.4 ENCOUNTER FOR LONG-TERM (CURRENT) USE OF INSULIN (HCC): ICD-10-CM

## 2022-04-18 DIAGNOSIS — R79.1 ABNORMAL COAGULATION PROFILE: ICD-10-CM

## 2022-04-18 LAB
ALBUMIN SERPL BCP-MCNC: 3.9 G/DL (ref 3.5–5)
ALP SERPL-CCNC: 65 U/L (ref 46–116)
ALT SERPL W P-5'-P-CCNC: 17 U/L (ref 12–78)
ANION GAP SERPL CALCULATED.3IONS-SCNC: -1 MMOL/L (ref 4–13)
APTT PPP: 30 SECONDS (ref 23–37)
AST SERPL W P-5'-P-CCNC: 15 U/L (ref 5–45)
BASOPHILS # BLD AUTO: 0.03 THOUSANDS/ΜL (ref 0–0.1)
BASOPHILS NFR BLD AUTO: 1 % (ref 0–1)
BILIRUB SERPL-MCNC: 0.54 MG/DL (ref 0.2–1)
BUN SERPL-MCNC: 21 MG/DL (ref 5–25)
CALCIUM SERPL-MCNC: 9.2 MG/DL (ref 8.3–10.1)
CHLORIDE SERPL-SCNC: 108 MMOL/L (ref 100–108)
CO2 SERPL-SCNC: 32 MMOL/L (ref 21–32)
CREAT SERPL-MCNC: 1.46 MG/DL (ref 0.6–1.3)
EOSINOPHIL # BLD AUTO: 0.13 THOUSAND/ΜL (ref 0–0.61)
EOSINOPHIL NFR BLD AUTO: 3 % (ref 0–6)
ERYTHROCYTE [DISTWIDTH] IN BLOOD BY AUTOMATED COUNT: 13.1 % (ref 11.6–15.1)
GFR SERPL CREATININE-BSD FRML MDRD: 48 ML/MIN/1.73SQ M
GGT SERPL-CCNC: 21 U/L (ref 5–85)
GLUCOSE P FAST SERPL-MCNC: 125 MG/DL (ref 65–99)
HCT VFR BLD AUTO: 44.9 % (ref 36.5–49.3)
HGB BLD-MCNC: 14.1 G/DL (ref 12–17)
IMM GRANULOCYTES # BLD AUTO: 0.02 THOUSAND/UL (ref 0–0.2)
IMM GRANULOCYTES NFR BLD AUTO: 0 % (ref 0–2)
INR PPP: 1.1 (ref 0.84–1.19)
LYMPHOCYTES # BLD AUTO: 1.81 THOUSANDS/ΜL (ref 0.6–4.47)
LYMPHOCYTES NFR BLD AUTO: 37 % (ref 14–44)
MAGNESIUM SERPL-MCNC: 2.1 MG/DL (ref 1.6–2.6)
MCH RBC QN AUTO: 31.6 PG (ref 26.8–34.3)
MCHC RBC AUTO-ENTMCNC: 31.4 G/DL (ref 31.4–37.4)
MCV RBC AUTO: 101 FL (ref 82–98)
MONOCYTES # BLD AUTO: 0.31 THOUSAND/ΜL (ref 0.17–1.22)
MONOCYTES NFR BLD AUTO: 6 % (ref 4–12)
NEUTROPHILS # BLD AUTO: 2.55 THOUSANDS/ΜL (ref 1.85–7.62)
NEUTS SEG NFR BLD AUTO: 53 % (ref 43–75)
NRBC BLD AUTO-RTO: 0 /100 WBCS
PLATELET # BLD AUTO: 156 THOUSANDS/UL (ref 149–390)
PMV BLD AUTO: 11.5 FL (ref 8.9–12.7)
POTASSIUM SERPL-SCNC: 5.9 MMOL/L (ref 3.5–5.3)
PROT SERPL-MCNC: 7.3 G/DL (ref 6.4–8.2)
PROTHROMBIN TIME: 13.8 SECONDS (ref 11.6–14.5)
RBC # BLD AUTO: 4.46 MILLION/UL (ref 3.88–5.62)
SODIUM SERPL-SCNC: 139 MMOL/L (ref 136–145)
TACROLIMUS BLD-MCNC: 5.7 NG/ML (ref 2–20)
WBC # BLD AUTO: 4.85 THOUSAND/UL (ref 4.31–10.16)

## 2022-04-18 PROCEDURE — 36415 COLL VENOUS BLD VENIPUNCTURE: CPT

## 2022-04-18 PROCEDURE — 85610 PROTHROMBIN TIME: CPT

## 2022-04-18 PROCEDURE — 80197 ASSAY OF TACROLIMUS: CPT

## 2022-04-18 PROCEDURE — 80053 COMPREHEN METABOLIC PANEL: CPT

## 2022-04-18 PROCEDURE — 85730 THROMBOPLASTIN TIME PARTIAL: CPT

## 2022-04-18 PROCEDURE — 85025 COMPLETE CBC W/AUTO DIFF WBC: CPT

## 2022-04-18 PROCEDURE — 82977 ASSAY OF GGT: CPT

## 2022-04-18 PROCEDURE — 83735 ASSAY OF MAGNESIUM: CPT

## 2022-05-09 ENCOUNTER — TELEPHONE (OUTPATIENT)
Dept: INTERNAL MEDICINE CLINIC | Facility: CLINIC | Age: 67
End: 2022-05-09

## 2022-05-09 NOTE — TELEPHONE ENCOUNTER
Dion Nguyễn from [address] [place] [address]     Requests a  LETTER OF NECESSITY for O2  Patients last office notes faxed     Fax # [phone]     # [phone]

## 2022-05-16 ENCOUNTER — APPOINTMENT (OUTPATIENT)
Dept: LAB | Facility: CLINIC | Age: 67
End: 2022-05-16
Payer: COMMERCIAL

## 2022-06-10 DIAGNOSIS — G62.9 PERIPHERAL NEUROPATHY: ICD-10-CM

## 2022-06-20 RX ORDER — GABAPENTIN 600 MG/1
TABLET ORAL
Qty: 135 TABLET | Refills: 5 | Status: SHIPPED | OUTPATIENT
Start: 2022-06-20

## 2022-06-20 NOTE — TELEPHONE ENCOUNTER
Please review and sign  Once approved, will forward to scheduling to assist with making a f/u appt  Thanks!

## 2022-06-21 ENCOUNTER — TELEPHONE (OUTPATIENT)
Dept: INTERNAL MEDICINE CLINIC | Facility: CLINIC | Age: 67
End: 2022-06-21

## 2022-06-21 ENCOUNTER — APPOINTMENT (OUTPATIENT)
Dept: LAB | Facility: CLINIC | Age: 67
End: 2022-06-21
Payer: COMMERCIAL

## 2022-06-21 ENCOUNTER — OFFICE VISIT (OUTPATIENT)
Dept: INTERNAL MEDICINE CLINIC | Facility: CLINIC | Age: 67
End: 2022-06-21
Payer: COMMERCIAL

## 2022-06-21 VITALS
OXYGEN SATURATION: 97 % | HEART RATE: 58 BPM | TEMPERATURE: 97.1 F | SYSTOLIC BLOOD PRESSURE: 110 MMHG | DIASTOLIC BLOOD PRESSURE: 68 MMHG | WEIGHT: 163.4 LBS | BODY MASS INDEX: 21.66 KG/M2 | HEIGHT: 73 IN

## 2022-06-21 DIAGNOSIS — L84 CALLUS: ICD-10-CM

## 2022-06-21 DIAGNOSIS — E11.69 TYPE 2 DIABETES MELLITUS WITH OTHER SPECIFIED COMPLICATION, WITHOUT LONG-TERM CURRENT USE OF INSULIN (HCC): ICD-10-CM

## 2022-06-21 DIAGNOSIS — J43.8 OTHER EMPHYSEMA (HCC): ICD-10-CM

## 2022-06-21 DIAGNOSIS — Z94.4 LIVER TRANSPLANTED (HCC): Primary | ICD-10-CM

## 2022-06-21 DIAGNOSIS — T78.40XS ALLERGY, SEQUELA: ICD-10-CM

## 2022-06-21 LAB
LEFT EYE DIABETIC RETINOPATHY: NORMAL
LEFT EYE IMAGE QUALITY: NORMAL
LEFT EYE MACULAR EDEMA: NORMAL
LEFT EYE OTHER RETINOPATHY: NORMAL
RIGHT EYE DIABETIC RETINOPATHY: NORMAL
RIGHT EYE IMAGE QUALITY: NORMAL
RIGHT EYE MACULAR EDEMA: NORMAL
RIGHT EYE OTHER RETINOPATHY: NORMAL
SEVERITY (EYE EXAM): NORMAL

## 2022-06-21 PROCEDURE — 92250 FUNDUS PHOTOGRAPHY W/I&R: CPT | Performed by: NURSE PRACTITIONER

## 2022-06-21 PROCEDURE — 99214 OFFICE O/P EST MOD 30 MIN: CPT | Performed by: NURSE PRACTITIONER

## 2022-06-21 RX ORDER — FLUTICASONE PROPIONATE 50 MCG
1 SPRAY, SUSPENSION (ML) NASAL DAILY
Qty: 18 ML | Refills: 1 | Status: SHIPPED | OUTPATIENT
Start: 2022-06-21

## 2022-06-21 NOTE — TELEPHONE ENCOUNTER
----- Message from Elissa Bradford, [address]  sent at 6/21/2022 12:50 PM EDT -----  In office eye exam was normal- I would still go and see a retina specialist w/ those sx he was having

## 2022-06-21 NOTE — PROGRESS NOTES
[place]    NAME: Mitchell Lewis  AGE: 69 y o  SEX: male  : 1955     DATE: 2022     Assessment and Plan:     1  Liver transplanted [facility])  Following with transplant team    2  Other emphysema ([facility] )  o2 dependent- 6 min walk test on room air sats 88% - place on 2 liters and sats 94% and 96  % on 2ls at recovery    3  Allergy, sequela  - fluticasone (FLONASE) 50 mcg/act nasal spray; 1 spray into each nostril daily  Dispense: 18 mL; Refill: 1    4  Type 2 diabetes mellitus with other specified complication, without long-term current use of insulin ([facility] )  - IRIS Diabetic eye exam    5  Callus  - Ambulatory Referral to Podiatry; Future    6  Weight loss-  utd w/ health maintance and essentially normal CT's in     Falls Plan of Care: balance, strength, and gait training instructions were provided  Recommended assistive device to help with gait and balance  Home safety education provided  Tobacco Cessation Counseling: Tobacco cessation counseling was provided  The patient is sincerely urged to quit consumption of tobacco  He is not ready to quit tobacco  Medication options discussed  No follow-ups on file  History of Present Illness:   Needs o2 recertification  Uses o2 at night all the time and while at home  Still smoking MJ and cigs   Has callus on foot that is [id number]  Continues to lose weight      Review of Systems:     Review of Systems   Constitutional: Negative for appetite change, chills, diaphoresis, fatigue, fever and unexpected weight change  HENT: Negative for postnasal drip and sneezing  Eyes: Negative for visual disturbance  Respiratory: Negative for chest tightness and shortness of breath  Cardiovascular: Negative for chest pain, palpitations and leg swelling  Gastrointestinal: Negative for abdominal pain and blood in stool     Endocrine: Negative for cold intolerance, heat intolerance, polydipsia, polyphagia and polyuria  Genitourinary: Negative for difficulty urinating, dysuria, frequency and urgency  Musculoskeletal: Negative for arthralgias and myalgias  Foot pain   Skin: Negative for rash and wound  Neurological: Negative for dizziness, weakness, light-headedness and headaches  Hematological: Negative for adenopathy  Psychiatric/Behavioral: Negative for confusion, dysphoric mood and sleep disturbance  The patient is not nervous/anxious  Objective:     /68   Pulse 58   Temp (!) 97 1 °F (36 2 °C)   Ht 6' 1" (1 854 m)   Wt 74 1 kg (163 lb 6 4 oz)   SpO2 97%   BMI 21 56 kg/m²     Physical Exam  Constitutional:       Appearance: He is well-developed  HENT:      Head: Normocephalic and atraumatic  Eyes:      Conjunctiva/sclera: Conjunctivae normal       Pupils: Pupils are equal, round, and reactive to light  Cardiovascular:      Rate and Rhythm: Normal rate and regular rhythm  Heart sounds: Normal heart sounds  Pulmonary:      Effort: Pulmonary effort is normal       Breath sounds: Normal breath sounds  Abdominal:      General: Bowel sounds are normal       Palpations: Abdomen is soft  Musculoskeletal:         General: Normal range of motion  Cervical back: Normal range of motion  Skin:     General: Skin is warm and dry  Neurological:      Mental Status: He is alert and oriented to person, place, and time           JOSE Jefferson  [facility] [address] [place] [address]

## 2022-07-07 ENCOUNTER — APPOINTMENT (OUTPATIENT)
Dept: LAB | Facility: CLINIC | Age: 67
End: 2022-07-07
Payer: COMMERCIAL

## 2022-07-07 ENCOUNTER — OFFICE VISIT (OUTPATIENT)
Dept: CARDIOLOGY CLINIC | Facility: CLINIC | Age: 67
End: 2022-07-07
Payer: COMMERCIAL

## 2022-07-07 VITALS
DIASTOLIC BLOOD PRESSURE: 70 MMHG | HEIGHT: 73 IN | WEIGHT: 163 LBS | SYSTOLIC BLOOD PRESSURE: 142 MMHG | HEART RATE: 58 BPM | BODY MASS INDEX: 21.6 KG/M2 | RESPIRATION RATE: 18 BRPM | OXYGEN SATURATION: 97 %

## 2022-07-07 DIAGNOSIS — I50.32 CHRONIC DIASTOLIC HEART FAILURE (HCC): ICD-10-CM

## 2022-07-07 DIAGNOSIS — I10 PRIMARY HYPERTENSION: ICD-10-CM

## 2022-07-07 DIAGNOSIS — R00.1 BRADYCARDIA BY ELECTROCARDIOGRAM: ICD-10-CM

## 2022-07-07 DIAGNOSIS — E78.2 MIXED HYPERLIPIDEMIA: ICD-10-CM

## 2022-07-07 DIAGNOSIS — I25.10 ATHEROSCLEROSIS OF NATIVE CORONARY ARTERY OF NATIVE HEART WITHOUT ANGINA PECTORIS: Primary | ICD-10-CM

## 2022-07-07 PROCEDURE — 99214 OFFICE O/P EST MOD 30 MIN: CPT | Performed by: INTERNAL MEDICINE

## 2022-07-07 PROCEDURE — 93000 ELECTROCARDIOGRAM COMPLETE: CPT | Performed by: INTERNAL MEDICINE

## 2022-07-07 PROCEDURE — 36415 COLL VENOUS BLD VENIPUNCTURE: CPT

## 2022-07-07 PROCEDURE — 80048 BASIC METABOLIC PNL TOTAL CA: CPT

## 2022-07-07 NOTE — PROGRESS NOTES
[facility]  [address] [facility] [address] [id number]  Cardiology Follow Up    Grant Mosley  1955  [id number]      1  Atherosclerosis of native coronary artery of native heart without angina pectoris  POCT ECG   2  Chronic diastolic heart failure ([address] )     3  Primary hypertension     4  Mixed hyperlipidemia         Chief Complaint   Patient presents with    Follow-up       Interval History:   23-year-old male with nonobstructive coronaries(left circumflex 50% stenosis in 2015), hypertension, hyperlipidemia, active smoker, COPD on p r n  Oxygen, mild-to-moderate pulmonary hypertension, hypertensive C/liver transplant in 2004 presented for cardiology follow-up    Patient was seen in Cardiology Clinic in April 2021  Since last clinic visit patient is doing okay  He denies any chest pain, shortness of breath at rest, palpitation, dizziness, orthopnea, leg edema or loss of consciousness  He does have chronic shortness of breath on exertion which is unchanged and better since she is taking Lasix  He has lost 10 lb and patient is going to be scheduled for colonoscopy  Currently he is taking Lasix 20 mg every other day as he gets uncomfortable at site of bladder(patient has seen Urology and everything is okay as per patient) after taking Lasix daily    Current medications reviewed   (patient was on Lasix 40 mg but it was reduced to 20 mg due to on discomfort in bladder)  He continues to use oxygen p r n  Basis    Labs from June 2022 reviewed  Creatinine 1 37 with GFR of 52  AST ALT within normal limit  LDL 88, hemoglobin 15 3    Review of Systems:   All review of system negative except as mentioned above    Patient Active Problem List   Diagnosis    Anxiety    Arteriosclerosis of coronary artery    Chronic obstructive pulmonary disease (HCC)    Esophagitis, reflux    Hyperlipidemia    Hypertension    Immunosuppression ([facility] )    Primary osteoarthritis of left knee    Liver transplanted ([place] [address] )    Impaired fasting glucose    Idiopathic peripheral neuropathy    Tobacco abuse    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)    Hepatic fibrosis    Blood clotting disorder (HCC)    Hepatitis C virus carrier state ([place] [address] )    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[place] [address] )    Screening for prostate cancer    Type 2 diabetes mellitus, without long-term current use of insulin (HCC)    Depression, recurrent ([place] [address] )    Other specified disorders of arteries and arterioles (HCC)    Chronic diastolic heart failure (HCC)    Stage 3a chronic kidney disease ([place] [address] )     Past Medical History:   Diagnosis Date    Abnormal electrocardiogram     Abnormal findings on radiological examination of gastrointestinal tract     Abnormal liver enzymes     Acute hepatitis C     Adrenal disorder (HCC)     Aneurysm of unspecified site ([place] [address] )     Benign neoplasm of skin     Bronchopneumonia     Chronic hepatitis C (HCC)     Chronic obstructive asthma (HCC)     Cirrhosis (HCC)     Colon, diverticulosis     COPD (chronic obstructive pulmonary disease) (HCC)     Depression     Diabetes mellitus ([place] [address] )     Drug dependence, continuous abuse ([place] [address] )     Hyperlipidemia     Hypertension     Laennec's cirrhosis (alcoholic) ([place] [address] )     Malabsorption syndrome     Mitral valve disorder     Murmur     Nonspecific abnormal finding on cardiac evaluation     Peripheral vascular disease (HCC)     Pleural effusion     Pneumonia     Rectal hemorrhage     Renal failure     Respiratory abnormality     Restrictive lung disease     lung disease other not elsewhere class     Testicular dysfunction     testicular hypfunction other     Tricuspid valve disorders, non-rheumatic     Type 2 diabetes mellitus (HCC)     uncomplicated controlled      Ventral hernia      Social History     Socioeconomic History    Marital status:      Spouse name: Not on file    Number of children: Not on file    Years of education: Not on file  Highest education level: Not on file   Occupational History    Not on file   Tobacco Use    Smoking status: Current Every Day Smoker     Packs/day: 0 25     Years: 40 00     Pack years: 10 00     Types: Cigarettes    Smokeless tobacco: Never Used    Tobacco comment: 5 a day    Vaping Use    Vaping Use: Some days    Substances: Nicotine, Flavoring   Substance and Sexual Activity    Alcohol use: No     Comment: no alcohol use     Drug use: Yes     Types: Marijuana     Comment: medical marijuana    Sexual activity: Never   Other Topics Concern    Not on file   Social History Narrative    Disability     Lives with adult children      Social Determinants of Health     Financial Resource Strain: Not on file   Food Insecurity: Not on file   Transportation Needs: Not on file   Physical Activity: Not on file   Stress: Not on file   Social Connections: Not on file   Intimate Partner Violence: Not on file   Housing Stability: Not on file      Family History   Problem Relation Age of Onset    Lung cancer Mother         overlapping sites of lung unspecified laterality     Heart disease Father         cardiac disorder    Stroke Father     Arthritis Family     Cancer Family     Liver disease Family         chronic    Coronary artery disease Family     Diabetes Family     Hypertension Brother     Hypertension Brother     No Known Problems Brother     No Known Problems Brother      Past Surgical History:   Procedure Laterality Date    CHOLECYSTECTOMY      GALLBLADDER SURGERY      HEPATITIS B SURFACE AB QN,LIVER TRANSPLANT (HISTORICAL)      HERNIA REPAIR      JOINT REPLACEMENT      L knee 3/10/20    KNEE SURGERY Left 03/2020    WOUND DEBRIDEMENT Left 4/10/2020    Procedure: EXCISIONAL DEBRIDEMENT;  Surgeon: Carolyn Mcgowan MD;  Location: [facility];  Service: General       Current Outpatient Medications:     atenolol (TENORMIN) 100 mg tablet, Take 1 tablet (100 mg total) by mouth 2 (two) times a day, Disp: 180 tablet, Rfl: 3    fluticasone (FLONASE) 50 mcg/act nasal spray, 1 spray into each nostril daily, Disp: 18 mL, Rfl: 1    furosemide (LASIX) 20 mg tablet, Take 1 tablet (20 mg total) by mouth daily (Patient taking differently: Take 20 mg by mouth every other day), Disp: 90 tablet, Rfl: 3    gabapentin (NEURONTIN) 600 MG tablet, 1 5 p o  t i d  as directed, Disp: 135 tablet, Rfl: 5    Klor-Con M20 20 MEQ tablet, TAKE 1 TABLET (20 MEQ TOTAL) BY MOUTH EVERY OTHER DAY, Disp: 90 tablet, Rfl: 3    NON FORMULARY, Medical Marijuana, Disp: , Rfl:     pantoprazole (PROTONIX) 40 mg tablet, Take 1 tablet (40 mg total) by mouth daily, Disp: 90 tablet, Rfl: 3    pravastatin (PRAVACHOL) 20 mg tablet, TAKE ONE TABLET BY MOUTH EVERY DAY, Disp: 173 tablet, Rfl: 0    tacrolimus (PROGRAF) 1 mg capsule, Take by mouth , Disp: , Rfl:   Allergies   Allergen Reactions    Dust Mite Extract Cough, Eye Swelling, Itching and Wheezing    Muscle Relief  [Capsaicin] Confusion, Headache, Lightheadedness, Palpitations, Tinnitus and Vomiting    Other      Muscle relaxer's causes vomiting and nausea     Grass Extracts [Gramineae Pollens] Hives and Sneezing    Pollen Extract Hives and Sneezing     Watery Eyes    Prozac [Fluoxetine]        Labs:  Office Visit on 2022   Component Date Value    Severity 2022 NORMAL     Right Eye Diabetic Retin* 2022 None     Right Eye Macular Edema 2022 None     Right Eye Other Retinopa* 2022 None     Right Eye Image Quality 2022 Not Gradable Image     Left Eye Diabetic Retino* 2022 None     Left Eye Macular Edema 2022 None     Left Eye Other Retinopat* 2022 None     Left Eye Image Quality 2022 Gradable Image     Result 2022 Retinal Study Result for LYUBOV PACO     Result 2022 maricruz Talamantes 68 y/o, M (: 1955, MRN: [id number])     Result 2022 presented to [facility] on 2022 for a retinal imaging study of the left and right eyes   Result 06/21/2022 Based on the findings of the study, the following is recommended for Eve Cordero     Result 06/21/2022 Not Gradable Eye(s) Found: Schedule an appointment with a retina specialist for further evaluation within 3 months   Result 06/21/2022 Interpreting Provider's Comments:  No comments provided     Result 06/21/2022 Right eye findings: Image not gradable for reasons listed: No View to Retina     Result 06/21/2022 Left eye findings: Negative for Diabetic Retinopathy     Result 06/21/2022 Negative for Macular Edema     Result 06/21/2022 This result was electronically signed by Althea Eubanks MD, NPI: [id number], Taxonomy: [id number] on 06- 13:50 [facility]   Result 06/21/2022 NOTE:  Any pathology noted on this diabetic retinal evaluation should be confirmed by an appropriate ophthalmic examination      Ancillary Orders on 05/27/2022   Component Date Value    Sodium 06/21/2022 135 (A)    Potassium 06/21/2022 5 1     Chloride 06/21/2022 105     CO2 06/21/2022 30     ANION GAP 06/21/2022 0 (A)    BUN 06/21/2022 15     Creatinine 06/21/2022 1 37 (A)    Glucose, Fasting 06/21/2022 106 (A)    Calcium 06/21/2022 9 6     AST 06/21/2022 16     ALT 06/21/2022 16     Alkaline Phosphatase 06/21/2022 67     Total Protein 06/21/2022 8 1     Albumin 06/21/2022 4 2     Total Bilirubin 06/21/2022 0 79     eGFR 06/21/2022 52     GGT 06/21/2022 12     Magnesium 06/21/2022 2 3     WBC 06/21/2022 6 59     RBC 06/21/2022 4 85     Hemoglobin 06/21/2022 15 3     Hematocrit 06/21/2022 47 4     MCV 06/21/2022 98     MCH 06/21/2022 31 5     MCHC 06/21/2022 32 3     RDW 06/21/2022 13 0     MPV 06/21/2022 10 8     Platelets 57/50/5026 166     nRBC 06/21/2022 0     Neutrophils Relative 06/21/2022 65     Immat GRANS % 06/21/2022 0     Lymphocytes Relative 06/21/2022 27     Monocytes Relative 06/21/2022 5     Eosinophils Relative 06/21/2022 2     Basophils Relative 06/21/2022 1     Neutrophils Absolute 06/21/2022 4 28     Immature Grans Absolute 06/21/2022 0 02     Lymphocytes Absolute 06/21/2022 1 77     Monocytes Absolute 06/21/2022 0 35     Eosinophils Absolute 06/21/2022 0 12     Basophils Absolute 06/21/2022 0 05     TACROLIMUS 06/21/2022 6 8     Protime 06/21/2022 13 2     INR 06/21/2022 1 04     PTT 06/21/2022 32    Ancillary Orders on 05/06/2022   Component Date Value    Sodium 05/16/2022 136     Potassium 05/16/2022 4 3     Chloride 05/16/2022 105     CO2 05/16/2022 27     ANION GAP 05/16/2022 4     BUN 05/16/2022 28 (A)    Creatinine 05/16/2022 1 45 (A)    Glucose, Fasting 05/16/2022 109 (A)    Calcium 05/16/2022 9 1     AST 05/16/2022 18     ALT 05/16/2022 17     Alkaline Phosphatase 05/16/2022 64     Total Protein 05/16/2022 7 4     Albumin 05/16/2022 3 6     Total Bilirubin 05/16/2022 0 67     eGFR 05/16/2022 48     GGT 05/16/2022 11     Magnesium 05/16/2022 1 9     WBC 05/16/2022 5 80     RBC 05/16/2022 4 59     Hemoglobin 05/16/2022 14 7     Hematocrit 05/16/2022 44 9     MCV 05/16/2022 98     MCH 05/16/2022 32 0     MCHC 05/16/2022 32 7     RDW 05/16/2022 12 6     MPV 05/16/2022 11 2     Platelets 91/28/2959 174     nRBC 05/16/2022 0     Neutrophils Relative 05/16/2022 64     Immat GRANS % 05/16/2022 1     Lymphocytes Relative 05/16/2022 26     Monocytes Relative 05/16/2022 7     Eosinophils Relative 05/16/2022 1     Basophils Relative 05/16/2022 1     Neutrophils Absolute 05/16/2022 3 76     Immature Grans Absolute 05/16/2022 0 03     Lymphocytes Absolute 05/16/2022 1 50     Monocytes Absolute 05/16/2022 0 39     Eosinophils Absolute 05/16/2022 0 05     Basophils Absolute 05/16/2022 0 07     TACROLIMUS 05/16/2022 4 5     Protime 05/16/2022 14 0     INR 05/16/2022 1 12    Ancillary Orders on 04/18/2022   Component Date Value    PTT 05/16/2022 31    Appointment on 04/18/2022   Component Date Value    Sodium 04/18/2022 139     Potassium 04/18/2022 5 9 (A)    Chloride 04/18/2022 108     CO2 04/18/2022 32     ANION GAP 04/18/2022 -1 (A)    BUN 04/18/2022 21     Creatinine 04/18/2022 1 46 (A)    Glucose, Fasting 04/18/2022 125 (A)    Calcium 04/18/2022 9 2     AST 04/18/2022 15     ALT 04/18/2022 17     Alkaline Phosphatase 04/18/2022 65     Total Protein 04/18/2022 7 3     Albumin 04/18/2022 3 9     Total Bilirubin 04/18/2022 0 54     eGFR 04/18/2022 48     GGT 04/18/2022 21     Magnesium 04/18/2022 2 1     WBC 04/18/2022 4 85     RBC 04/18/2022 4 46     Hemoglobin 04/18/2022 14 1     Hematocrit 04/18/2022 44 9     MCV 04/18/2022 101 (A)    MCH 04/18/2022 31 6     MCHC 04/18/2022 31 4     RDW 04/18/2022 13 1     MPV 04/18/2022 11 5     Platelets 49/37/9367 156     nRBC 04/18/2022 0     Neutrophils Relative 04/18/2022 53     Immat GRANS % 04/18/2022 0     Lymphocytes Relative 04/18/2022 37     Monocytes Relative 04/18/2022 6     Eosinophils Relative 04/18/2022 3     Basophils Relative 04/18/2022 1     Neutrophils Absolute 04/18/2022 2 55     Immature Grans Absolute 04/18/2022 0 02     Lymphocytes Absolute 04/18/2022 1 81     Monocytes Absolute 04/18/2022 0 31     Eosinophils Absolute 04/18/2022 0 13     Basophils Absolute 04/18/2022 0 03     TACROLIMUS 04/18/2022 5 7     Protime 04/18/2022 13 8     INR 04/18/2022 1 10     PTT 04/18/2022 30    Appointment on 03/04/2022   Component Date Value    Magnesium 03/04/2022 1 9     Protime 03/04/2022 13 4     INR 03/04/2022 1 06     GGT 03/04/2022 104 (A)    TACROLIMUS 03/04/2022 1 8 (A)    PTH 03/04/2022 162 9 (A)   Appointment on 02/28/2022   Component Date Value    WBC 03/04/2022 7 15     RBC 03/04/2022 4 58     Hemoglobin 03/04/2022 15 0     Hematocrit 03/04/2022 43 7     MCV 03/04/2022 95     MCH 03/04/2022 32 8     MCHC 03/04/2022 34 3     RDW 03/04/2022 12 5     MPV 03/04/2022 11 1     Platelets 66/81/2278 182     nRBC 03/04/2022 0     Neutrophils Relative 03/04/2022 69     Immat GRANS % 03/04/2022 1     Lymphocytes Relative 03/04/2022 19     Monocytes Relative 03/04/2022 8     Eosinophils Relative 03/04/2022 2     Basophils Relative 03/04/2022 1     Neutrophils Absolute 03/04/2022 5 03     Immature Grans Absolute 03/04/2022 0 04     Lymphocytes Absolute 03/04/2022 1 37     Monocytes Absolute 03/04/2022 0 54     Eosinophils Absolute 03/04/2022 0 11     Basophils Absolute 03/04/2022 0 06     Sodium 03/04/2022 134 (A)    Potassium 03/04/2022 4 4     Chloride 03/04/2022 102     CO2 03/04/2022 27     ANION GAP 03/04/2022 5     BUN 03/04/2022 17     Creatinine 03/04/2022 1 41 (A)    Glucose 03/04/2022 143 (A)    Calcium 03/04/2022 9 8     AST 03/04/2022 26     ALT 03/04/2022 32     Alkaline Phosphatase 03/04/2022 92     Total Protein 03/04/2022 8 0     Albumin 03/04/2022 4 1     Total Bilirubin 03/04/2022 0 73     eGFR 03/04/2022 50     Cholesterol 03/04/2022 159     Triglycerides 03/04/2022 158 (A)    HDL, Direct 03/04/2022 39 (A)    LDL Calculated 03/04/2022 88     Non-HDL-Chol (CHOL-HDL) 03/04/2022 120     Hemoglobin A1C 03/04/2022 6 3 (A)    EAG 03/04/2022 134     TSH 3RD GENERATON 03/04/2022 1 570     Ig Iona Free Light Chain 03/04/2022 49 5 (A)    Ig Lambda Free Light Rosa* 03/04/2022 38 2 (A)    Kappa/Lambda FluidC Ratio 03/04/2022 1 30     A/G Ratio 03/04/2022 1 48     Albumin Electrophoresis 03/04/2022 59 7     Albumin CONC 03/04/2022 4 60     Alpha 1 03/04/2022 4 7     ALPHA 1 CONC 03/04/2022 0 36     Alpha 2 03/04/2022 10 7     ALPHA 2 CONC 03/04/2022 0 82     Beta-1 03/04/2022 5 4     BETA 1 CONC 03/04/2022 0 42     Beta-2 03/04/2022 5 3     BETA 2 CONC 03/04/2022 0 41     Gamma Globulin 03/04/2022 14 2     GAMMA CONC 03/04/2022 1 09     Total Protein 03/04/2022 7 7     SPEP Interpretation 03/04/2022 See Comment  Sed Rate 03/04/2022 27 (A)    Uric Acid 03/04/2022 8 8 (A)     Imaging: No results found  Physical Exam:  General:  Average built, awake, alert and oriented x3, not in distress  Neck: supple, no JVD  Eyes: PERRL, conjunctiva normal  Lungs:  Bilateral air entry positive, no wheeze/rhonchi or crackle  Heart:  S1-S2 normal, no murmur  Abdomen:  Soft ,nondistended ,nontender, bowel sounds positive  Extremities:  No leg edema, no deformity, ROM normal  Neuro:  Moving all extremities, speech clear  Skin: warm, no rash    /70 (BP Location: Left arm, Patient Position: Sitting, Cuff Size: Standard)   Pulse 58   Resp 18   Ht 6' 1" (1 854 m)   Wt 73 9 kg (163 lb)   SpO2 97%   BMI 21 51 kg/m²     Cardiographics :  ECG:  Sinus bradycardia heart rate 50, first-degree AV block, incomplete right bundle-branch block, borderline tall T-wave      Assessment:    1  Chronic diastolic heart failure  Compensated present time    2  Sinus bradycardia  Likely due to medication induced  3 Nonobstructive coronaries  Cardiac catheterization in 2015 slows left circumflex 50% stenosis  Lexiscan MPI stress test in 2020 negative for inducible ischemia  Patient denies chest pain    4 Hypertension  5  Hyperlipidemia  6  Mild to moderate pulmonary hypertension  7  COPD  8 Active smoker  Patient strongly advised to quit smoking  9  Hepatitis-C/liver transplant in 2004 being followed by transplant team      Recommendations:  24 hour Holter monitoring to evaluate for paroxysmal arrhythmia including any high-grade AV block  Decrease atenolol to 100 mg in a m  and 50 mg in p m  I will get BMP to evaluate for hyperkalemia  Patient advised to stop taking potassium given elevated potassium and underlying CKD  Patient has stop taking aspirin due to cost issue  I explained importance of baby aspirin    He was given samples from the clinic  Advised to continue aspirin 81, pravastatin, Lasix 20 mg daily and atenolol    Patient had 2D echocardiogram ordered by primary care physician and advised to schedule it    Strongly advised to quit smoking and take low-fat/low-cholesterol diet  He was educated about cardiac symptoms to watch out for and call 911 or go to nearest ED if he experiences those    Return to clinic in 6 months or early as needed  Above all discussed with patient    Patient understands and agrees

## 2022-07-08 ENCOUNTER — TELEPHONE (OUTPATIENT)
Dept: CARDIOLOGY CLINIC | Facility: CLINIC | Age: 67
End: 2022-07-08

## 2022-07-08 LAB
ANION GAP SERPL CALCULATED.3IONS-SCNC: 7 MMOL/L (ref 4–13)
BUN SERPL-MCNC: 20 MG/DL (ref 5–25)
CALCIUM SERPL-MCNC: 9.2 MG/DL (ref 8.3–10.1)
CHLORIDE SERPL-SCNC: 106 MMOL/L (ref 100–108)
CO2 SERPL-SCNC: 24 MMOL/L (ref 21–32)
CREAT SERPL-MCNC: 1.46 MG/DL (ref 0.6–1.3)
GFR SERPL CREATININE-BSD FRML MDRD: 48 ML/MIN/1.73SQ M
GLUCOSE P FAST SERPL-MCNC: 107 MG/DL (ref 65–99)
POTASSIUM SERPL-SCNC: 4.8 MMOL/L (ref 3.5–5.3)
SODIUM SERPL-SCNC: 137 MMOL/L (ref 136–145)

## 2022-07-08 NOTE — TELEPHONE ENCOUNTER
----- Message from Alda Baumann MD sent at 7/8/2022 11:17 AM EDT -----  Please call the patient and inform him that I have reviewed BMP  Renal function is at baseline and no significant changes as compared to prior renal function  His potassium is within normal limits  He should continue medication as discussed in the clinic yesterday    No need to take oral potassium

## 2022-07-13 ENCOUNTER — HOSPITAL ENCOUNTER (OUTPATIENT)
Dept: NON INVASIVE DIAGNOSTICS | Facility: CLINIC | Age: 67
Discharge: HOME | End: 2022-07-13
Payer: COMMERCIAL

## 2022-07-13 DIAGNOSIS — R00.1 BRADYCARDIA BY ELECTROCARDIOGRAM: ICD-10-CM

## 2022-07-13 PROCEDURE — 93225 XTRNL ECG REC<48 HRS REC: CPT

## 2022-07-13 PROCEDURE — 93226 XTRNL ECG REC<48 HR SCAN A/R: CPT

## 2022-07-17 PROCEDURE — 93227 XTRNL ECG REC<48 HR R&I: CPT | Performed by: INTERNAL MEDICINE

## 2022-07-18 ENCOUNTER — TELEPHONE (OUTPATIENT)
Dept: CARDIOLOGY CLINIC | Facility: CLINIC | Age: 67
End: 2022-07-18

## 2022-07-18 NOTE — TELEPHONE ENCOUNTER
Spoke with pt  Pt verbally understood the results of his Holter monitor that it is overall unremarkable with no significant arrhythmias  To continue his present medications  Patient stated that he wants to see Dr Mae Ureña because Dr Zia Mack recommended him  Pt was told that Dr Mae Ureña is not currently accepting new pt but he insisted that I send you this message  Please advise    Please schedule pt as per Dr Mae Ureña 's message  Pt should have an appointment with 1 of the providers as Todd Matthews has left in the next 2 to 3 months

## 2022-07-18 NOTE — TELEPHONE ENCOUNTER
Please call the patient  Holter monitor overall unremarkable with no significant arrhythmias  Patient to continue his present medications  Patient should have an appointment with 1 of the providers as Guillermo Quiroz has left in the next 2 to 3 months

## 2022-07-25 ENCOUNTER — TELEPHONE (OUTPATIENT)
Dept: INTERNAL MEDICINE CLINIC | Facility: CLINIC | Age: 67
End: 2022-07-25

## 2022-07-25 NOTE — TELEPHONE ENCOUNTER
Pt  Getting  His  ELECTRIC   SHUT  OFF  TODAY  ACCOUNT  NUMBER  [id number]  [facility]   [address] [place] [address]   [id number]  [facility]  PHONE [phone]  FOR  JAZMÍN    JENSEN 1955  PT  IS  ON  O2 FOR  COPD   [facility]    [address]  [address]  [phone]     FAX   NUMBER   PT  WILL  CALL BACK  WITH THE  FAX  NUMBER

## 2022-07-25 NOTE — LETTER
July 25, 2022     Patient: Soraya Villalba  YOB: 1955  Date of Visit: 7/25/2022      To Whom it May Concern:    Johnnie Flores is under my professional care  His electric can not be shut off due to patient being on oxygen  If you have any questions or concerns, please don't hesitate to call           Sincerely,      Jessica Jiang      CC: No Recipients

## 2022-07-25 NOTE — TELEPHONE ENCOUNTER
Why isnt he paying his bill? They allow monthly payments? Have we every done this before? Generally our rule is we do it once and only once   I feel like we did it years ago

## 2022-08-01 ENCOUNTER — TELEPHONE (OUTPATIENT)
Dept: INTERNAL MEDICINE CLINIC | Facility: CLINIC | Age: 67
End: 2022-08-01

## 2022-08-01 NOTE — TELEPHONE ENCOUNTER
SIRIA ORDOÑEZ   FAXED  A  FORM  OVER  FOR  EDUARDO  TO  SIGN  SO   HIS   GAS  DOESN'T   GET  SHUT  OFF     PT  LIVES WITH  [address]   AND  GAS  IS  UNDER  HER  NAME   FORM   IN  [place]

## 2022-09-26 ENCOUNTER — TELEPHONE (OUTPATIENT)
Dept: NEUROLOGY | Facility: CLINIC | Age: 67
End: 2022-09-26

## 2022-10-12 PROBLEM — Z12.5 SCREENING FOR PROSTATE CANCER: Status: RESOLVED | Noted: 2021-04-29 | Resolved: 2022-10-12

## 2022-11-17 DIAGNOSIS — E78.5 HYPERLIPIDEMIA, UNSPECIFIED HYPERLIPIDEMIA TYPE: ICD-10-CM

## 2022-11-17 DIAGNOSIS — I10 HYPERTENSION, UNSPECIFIED TYPE: ICD-10-CM

## 2022-11-17 DIAGNOSIS — K21.00 ESOPHAGITIS, REFLUX: ICD-10-CM

## 2022-11-17 RX ORDER — PRAVASTATIN SODIUM 20 MG
TABLET ORAL
Qty: 173 TABLET | Refills: 0 | Status: SHIPPED | OUTPATIENT
Start: 2022-11-17

## 2022-11-18 RX ORDER — ATENOLOL 100 MG/1
TABLET ORAL
Qty: 180 TABLET | Refills: 0 | Status: SHIPPED | OUTPATIENT
Start: 2022-11-18

## 2022-11-18 RX ORDER — PANTOPRAZOLE SODIUM 40 MG/1
TABLET, DELAYED RELEASE ORAL
Qty: 90 TABLET | Refills: 0 | Status: SHIPPED | OUTPATIENT
Start: 2022-11-18

## 2022-11-25 ENCOUNTER — HOSPITAL ENCOUNTER (EMERGENCY)
Facility: HOSPITAL | Age: 67
Discharge: HOME | End: 2022-11-25
Attending: EMERGENCY MEDICINE

## 2022-11-25 VITALS
WEIGHT: 165 LBS | RESPIRATION RATE: 19 BRPM | SYSTOLIC BLOOD PRESSURE: 140 MMHG | OXYGEN SATURATION: 95 % | HEIGHT: 73 IN | DIASTOLIC BLOOD PRESSURE: 81 MMHG | BODY MASS INDEX: 21.87 KG/M2 | TEMPERATURE: 97.9 F | HEART RATE: 83 BPM

## 2022-11-25 LAB
FLUAV RNA RESP QL NAA+PROBE: POSITIVE
FLUBV RNA RESP QL NAA+PROBE: NEGATIVE
RSV RNA RESP QL NAA+PROBE: NEGATIVE
SARS-COV-2 RNA RESP QL NAA+PROBE: NEGATIVE

## 2022-12-06 ENCOUNTER — OFFICE VISIT (OUTPATIENT)
Dept: NEUROLOGY | Facility: CLINIC | Age: 67
End: 2022-12-06

## 2022-12-06 ENCOUNTER — APPOINTMENT (OUTPATIENT)
Dept: LAB | Facility: CLINIC | Age: 67
End: 2022-12-06

## 2022-12-06 VITALS
HEIGHT: 73 IN | WEIGHT: 154 LBS | HEART RATE: 62 BPM | DIASTOLIC BLOOD PRESSURE: 78 MMHG | BODY MASS INDEX: 20.41 KG/M2 | SYSTOLIC BLOOD PRESSURE: 128 MMHG

## 2022-12-06 DIAGNOSIS — Z12.5 SCREENING FOR PROSTATE CANCER: ICD-10-CM

## 2022-12-06 DIAGNOSIS — G62.9 PERIPHERAL NEUROPATHY: Primary | ICD-10-CM

## 2022-12-06 DIAGNOSIS — R41.3 MEMORY LOSS: ICD-10-CM

## 2022-12-06 DIAGNOSIS — E11.69 TYPE 2 DIABETES MELLITUS WITH OTHER SPECIFIED COMPLICATION, WITHOUT LONG-TERM CURRENT USE OF INSULIN (HCC): ICD-10-CM

## 2022-12-06 DIAGNOSIS — M54.2 CERVICALGIA: ICD-10-CM

## 2022-12-06 LAB
CHOLEST SERPL-MCNC: 139 MG/DL
HDLC SERPL-MCNC: 45 MG/DL
LDLC SERPL CALC-MCNC: 75 MG/DL (ref 0–100)
NONHDLC SERPL-MCNC: 94 MG/DL
PSA SERPL-MCNC: 0.5 NG/ML (ref 0–4)
TRIGL SERPL-MCNC: 95 MG/DL
TSH SERPL DL<=0.05 MIU/L-ACNC: 2.01 UIU/ML (ref 0.45–4.5)
URATE SERPL-MCNC: 7.6 MG/DL (ref 3.5–8.5)

## 2022-12-06 RX ORDER — OXCARBAZEPINE 300 MG/1
300 TABLET, FILM COATED ORAL EVERY 12 HOURS SCHEDULED
Qty: 60 TABLET | Refills: 5 | Status: SHIPPED | OUTPATIENT
Start: 2022-12-06

## 2022-12-06 NOTE — PROGRESS NOTES
Sandi Montiel is a 69 y o  male returns in follow-up today with history of peripheral neuropathy    Assessment:  1  Peripheral neuropathy    2  Memory loss    3  Cervicalgia        Plan:  Trileptal titrating from 150 twice daily up to 300 twice daily  Physical therapy  Follow-up 2 months    Discussion:  Sloan Bolanos reports that he discontinued gabapentin as he did not notice adequate pain relief at 900 mg 3 times daily  He will try Trileptal titrating from 150 twice daily potentially up to 450 twice daily at 1 week intervals  We discussed potential adverse effects of the medication  If he has problems with that he will notify me  He does report symptoms of neck pain radiating to the right shoulder  Have recommended a trial of physical therapy  If symptoms persist consider imaging  I will see him back in follow-up in a couple of months      Subjective:    HPI  Sloan Bolanos returns in follow-up today  Continues to have symptoms of neuropathic pain  He states that he did not feel the gabapentin was helpful so he discontinued it  He states he has tried Cymbalta in the past and has not found this to be helpful  He also reports symptoms of neck pain  He states the pain lateralizes to the right and radiates toward the right shoulder into the upper arm  He does have some issues with his right shoulder as well  He states the symptoms been present for a few weeks  He reports that he fractured his collarbone a couple of years ago and a fall        Past Medical History:   Diagnosis Date   • Abnormal electrocardiogram    • Abnormal findings on radiological examination of gastrointestinal tract    • Abnormal liver enzymes    • Acute hepatitis C    • Adrenal disorder (HCC)    • Aneurysm of unspecified site [facility])    • Benign neoplasm of skin    • Bronchopneumonia    • Chronic hepatitis C (HCC)    • Chronic obstructive asthma (HCC)    • Cirrhosis (HCC)    • Colon, diverticulosis    • COPD (chronic obstructive pulmonary disease) ([place] [address] )    • Depression    • Diabetes mellitus ([place] [address] )    • Drug dependence, continuous abuse ([place] [address] )    • Hyperlipidemia    • Hypertension    • Laennec's cirrhosis (alcoholic) ([place] [address] )    • Malabsorption syndrome    • Mitral valve disorder    • Murmur    • Nonspecific abnormal finding on cardiac evaluation    • Peripheral vascular disease (HCC)    • Pleural effusion    • Pneumonia    • Rectal hemorrhage    • Renal failure    • Respiratory abnormality    • Restrictive lung disease     lung disease other not elsewhere class    • Testicular dysfunction     testicular hypfunction other    • Tricuspid valve disorders, non-rheumatic    • Type 2 diabetes mellitus ([place] [address] )     uncomplicated controlled     • Ventral hernia        Family History:  Family History   Problem Relation Age of Onset   • Lung cancer Mother         overlapping sites of lung unspecified laterality    • Heart disease Father         cardiac disorder   • Stroke Father    • Arthritis Family    • Cancer Family    • Liver disease Family         chronic   • Coronary artery disease Family    • Diabetes Family    • Hypertension Brother    • Hypertension Brother    • No Known Problems Brother    • No Known Problems Brother        Past Surgical History:  Past Surgical History:   Procedure Laterality Date   • CHOLECYSTECTOMY     • GALLBLADDER SURGERY     • HEPATITIS B SURFACE AB QN,LIVER TRANSPLANT (HISTORICAL)     • HERNIA REPAIR     • JOINT REPLACEMENT      L knee 3/10/20   • KNEE SURGERY Left 03/2020   • WOUND DEBRIDEMENT Left 4/10/2020    Procedure: EXCISIONAL DEBRIDEMENT;  Surgeon: Gayle Lanza MD;  Location: [facility] OR;  Service: General       Social History:   reports that he has been smoking cigarettes  He has a 10 00 pack-year smoking history  He has never used smokeless tobacco  He reports current drug use  Drug: Marijuana  He reports that he does not drink alcohol      Allergies:  Dust mite extract, Muscle relief  [capsaicin], Other, Grass extracts [gramineae pollens], Pollen extract, and Prozac [fluoxetine]      Current Outpatient Medications:   •  atenolol (TENORMIN) 100 mg tablet, TAKE ONE TABLET BY MOUTH TWICE DAILY, Disp: 180 tablet, Rfl: 0  •  fluticasone (FLONASE) 50 mcg/act nasal spray, 1 spray into each nostril daily, Disp: 18 mL, Rfl: 1  •  NON FORMULARY, Medical Marijuana, Disp: , Rfl:   •  OXcarbazepine (TRILEPTAL) 300 mg tablet, Take 1 tablet (300 mg total) by mouth every 12 (twelve) hours, Disp: 60 tablet, Rfl: 5  •  pantoprazole (PROTONIX) 40 mg tablet, TAKE ONE TABLET BY MOUTH EVERY DAY, Disp: 90 tablet, Rfl: 0  •  pravastatin (PRAVACHOL) 20 mg tablet, TAKE ONE TABLET BY MOUTH EVERY DAY, Disp: 173 tablet, Rfl: 0  •  tacrolimus (PROGRAF) 1 mg capsule, Take by mouth , Disp: , Rfl:   •  furosemide (LASIX) 20 mg tablet, Take 1 tablet (20 mg total) by mouth daily (Patient taking differently: Take 20 mg by mouth every other day), Disp: 90 tablet, Rfl: 3  •  gabapentin (NEURONTIN) 600 MG tablet, 1 5 p o  t i d  as directed, Disp: 135 tablet, Rfl: 5    I have reviewed the past medical, social and family history, current medications, allergies, vitals, review of systems and updated this information as appropriate today     Objective:    Vitals:  Blood pressure 128/78, pulse 62, height 6' 1" (1 854 m), weight 69 9 kg (154 lb)  Physical Exam    Neurological Exam  GENERAL: Well-developed well-nourished man in no acute distress  HEENT/NECK: Head is atraumatic normocephalic, neck is supple with restricted range of motion to lateral rotation bilaterally  NEUROLOGIC:  Mental Status: Awake and alert without aphasia  Cranial Nerves: Extraocular movements are full  Face is symmetrical  Coordination: Gait is stable with a straight cane      ROS:    Review of Systems   Constitutional: Negative  Negative for appetite change and fever  HENT: Negative  Negative for hearing loss, tinnitus, trouble swallowing and voice change  Eyes: Negative    Negative for photophobia, pain and visual disturbance  Respiratory: Negative  Negative for shortness of breath  Cardiovascular: Negative  Negative for palpitations  Gastrointestinal: Negative  Negative for nausea and vomiting  Endocrine: Negative  Negative for cold intolerance  Genitourinary: Negative  Negative for dysuria, frequency and urgency  Musculoskeletal: Positive for neck pain (right side of neck)  Negative for gait problem and myalgias  Skin: Negative  Negative for rash  Allergic/Immunologic: Negative  Neurological: Positive for dizziness, light-headedness and numbness (both legs from knees down to feet)  Negative for tremors, seizures, syncope, facial asymmetry, speech difficulty, weakness and headaches  Hematological: Negative  Does not bruise/bleed easily  Psychiatric/Behavioral: Positive for confusion (decrease in short term memory)  Negative for hallucinations and sleep disturbance

## 2022-12-07 ENCOUNTER — HOSPITAL ENCOUNTER (OUTPATIENT)
Dept: ULTRASOUND IMAGING | Facility: CLINIC | Age: 67
Discharge: HOME | End: 2022-12-07

## 2022-12-07 ENCOUNTER — TELEPHONE (OUTPATIENT)
Dept: INTERNAL MEDICINE CLINIC | Facility: CLINIC | Age: 67
End: 2022-12-07

## 2022-12-07 DIAGNOSIS — Z12.9 SCREENING FOR CANCER: ICD-10-CM

## 2022-12-07 DIAGNOSIS — D84.9 IMMUNOSUPPRESSION-RELATED INFECTIOUS DISEASE (HCC): ICD-10-CM

## 2022-12-07 DIAGNOSIS — Z94.4 LIVER REPLACED BY TRANSPLANT (HCC): ICD-10-CM

## 2022-12-07 DIAGNOSIS — B99.8 IMMUNOSUPPRESSION-RELATED INFECTIOUS DISEASE (HCC): ICD-10-CM

## 2022-12-07 DIAGNOSIS — K74.00 HEPATIC FIBROSIS: ICD-10-CM

## 2022-12-07 LAB
EST. AVERAGE GLUCOSE BLD GHB EST-MCNC: 126 MG/DL
HBA1C MFR BLD: 6 %

## 2022-12-10 ENCOUNTER — RA CDI HCC (OUTPATIENT)
Dept: OTHER | Facility: HOSPITAL | Age: 67
End: 2022-12-10

## 2022-12-10 NOTE — PROGRESS NOTES
[address]  coding opportunities     E11 40, E11 22 and I13 0     Chart Reviewed number of suggestions sent to Provider: 3     Patients Insurance     Medicare Insurance: [employer] Advantage

## 2022-12-22 ENCOUNTER — OFFICE VISIT (OUTPATIENT)
Dept: INTERNAL MEDICINE CLINIC | Facility: CLINIC | Age: 67
End: 2022-12-22

## 2022-12-22 VITALS
RESPIRATION RATE: 16 BRPM | BODY MASS INDEX: 20.41 KG/M2 | WEIGHT: 154 LBS | SYSTOLIC BLOOD PRESSURE: 122 MMHG | DIASTOLIC BLOOD PRESSURE: 64 MMHG | HEART RATE: 90 BPM | HEIGHT: 73 IN

## 2022-12-22 DIAGNOSIS — R05.9 COUGH, UNSPECIFIED TYPE: ICD-10-CM

## 2022-12-22 DIAGNOSIS — E78.2 MIXED HYPERLIPIDEMIA: ICD-10-CM

## 2022-12-22 DIAGNOSIS — I10 PRIMARY HYPERTENSION: ICD-10-CM

## 2022-12-22 DIAGNOSIS — Z79.899 IMMUNOSUPPRESSION DUE TO DRUG THERAPY (HCC): ICD-10-CM

## 2022-12-22 DIAGNOSIS — H66.90 EAR INFECTION: ICD-10-CM

## 2022-12-22 DIAGNOSIS — J30.0 VASOMOTOR RHINITIS: ICD-10-CM

## 2022-12-22 DIAGNOSIS — N18.31 STAGE 3A CHRONIC KIDNEY DISEASE (HCC): ICD-10-CM

## 2022-12-22 DIAGNOSIS — E11.69 TYPE 2 DIABETES MELLITUS WITH OTHER SPECIFIED COMPLICATION, WITHOUT LONG-TERM CURRENT USE OF INSULIN (HCC): Primary | ICD-10-CM

## 2022-12-22 DIAGNOSIS — D84.821 IMMUNOSUPPRESSION DUE TO DRUG THERAPY (HCC): ICD-10-CM

## 2022-12-22 DIAGNOSIS — Z94.4 LIVER TRANSPLANTED (HCC): ICD-10-CM

## 2022-12-22 DIAGNOSIS — F41.9 ANXIETY: ICD-10-CM

## 2022-12-22 PROBLEM — C22.1 MALIGNANT NEOPLASM OF INTRAHEPATIC BILE DUCTS (HCC): Status: RESOLVED | Noted: 2019-02-11 | Resolved: 2022-12-22

## 2022-12-22 RX ORDER — PREDNISONE 10 MG/1
TABLET ORAL
Qty: 18 TABLET | Refills: 0 | Status: SHIPPED | OUTPATIENT
Start: 2022-12-22

## 2022-12-22 RX ORDER — AZITHROMYCIN 250 MG/1
TABLET, FILM COATED ORAL
Qty: 6 TABLET | Refills: 0 | Status: SHIPPED | OUTPATIENT
Start: 2022-12-22 | End: 2022-12-27

## 2022-12-22 RX ORDER — IPRATROPIUM BROMIDE 21 UG/1
2 SPRAY, METERED NASAL EVERY 12 HOURS
Qty: 30 ML | Refills: 2 | Status: SHIPPED | OUTPATIENT
Start: 2022-12-22

## 2022-12-22 RX ORDER — NEOMYCIN SULFATE, POLYMYXIN B SULFATE AND HYDROCORTISONE 10; 3.5; 1 MG/ML; MG/ML; [USP'U]/ML
3 SUSPENSION/ DROPS AURICULAR (OTIC) EVERY 8 HOURS SCHEDULED
Qty: 10 ML | Refills: 0 | Status: SHIPPED | OUTPATIENT
Start: 2022-12-22

## 2022-12-22 NOTE — PROGRESS NOTES
INTERNAL MEDICINE FOLLOW-UP VISIT  [facility] - [facility] PRANEETH NGUYEN    NAME: Cody Montelongo  AGE: 69 y o  SEX: male  : 1955     DATE: 2022     Assessment and Plan:   1  Type 2 diabetes mellitus with other specified complication, without long-term current use of insulin (HCC)  Under improved controlled   - Hemoglobin A1C; Future  - Microalbumin / creatinine urine ratio; Future    2  Primary hypertension  Stable on current medication  - CBC and differential; Future  - Comprehensive metabolic panel; Future    3  Stage 3a chronic kidney disease (HCC)  Stable recommend hydration    4  Anxiety      5  Mixed hyperlipidemia  LDL at goal on statin  - Lipid panel; Future  - TSH, 3rd generation with Free T4 reflex; Future    6  Liver transplanted [facility])  Following w/ tranplant team    7  Immunosuppression due to drug therapy ([facility] [address] )      8  Vasomotor rhinitis  - ipratropium (ATROVENT) 0 03 % nasal spray; 2 sprays into each nostril every 12 (twelve) hours  Dispense: 30 mL; Refill: 2  - predniSONE 10 mg tablet; Take 3 tablets for 3 days then 2 tablets for 3 days then 1 tablet for 3 days  Dispense: 18 tablet; Refill: 0    9  Ear infection  - neomycin-polymyxin-hydrocortisone (CORTISPORIN) 0 35%-10,000 units/mL-1% otic suspension; Administer 3 drops to the right ear every 8 (eight) hours  Dispense: 10 mL; Refill: 0    10  Cough, unspecified type  - azithromycin (ZITHROMAX) 250 mg tablet; Take 2 tabs today then 1 tab each additional day until complete  Dispense: 6 tablet; Refill: 0    11  Neuropathy  Following w/ neurology      No follow-ups on file  Chief Complaint:     Chief Complaint   Patient presents with   • Follow-up     Results        History of Present Illness:     Here for routine follow up   Neuropathy no better on trileptal following w/ neurology  Still smoking  Had flu still has cough  Right ear pain cant hear  No home bps   Nose doesn't stop running     The following portions of the patient's history were reviewed and updated as appropriate: allergies, current medications, past family history, past medical history, past social history, past surgical history and problem list      Review of Systems:     Review of Systems   Constitutional: Negative for appetite change, chills, diaphoresis, fatigue, fever and unexpected weight change  HENT: Negative for postnasal drip and sneezing  Eyes: Negative for visual disturbance  Respiratory: Negative for chest tightness and shortness of breath  Cardiovascular: Negative for chest pain, palpitations and leg swelling  Gastrointestinal: Negative for abdominal pain and blood in stool  Endocrine: Negative for cold intolerance, heat intolerance, polydipsia, polyphagia and polyuria  Genitourinary: Negative for difficulty urinating, dysuria, frequency and urgency  Musculoskeletal: Negative for arthralgias and myalgias  Skin: Negative for rash and wound  Neurological: Negative for dizziness, weakness, light-headedness and headaches  Hematological: Negative for adenopathy  Psychiatric/Behavioral: Negative for confusion, dysphoric mood and sleep disturbance  The patient is not nervous/anxious           Past Medical History:     Past Medical History:   Diagnosis Date   • Abnormal electrocardiogram    • Abnormal findings on radiological examination of gastrointestinal tract    • Abnormal liver enzymes    • Acute hepatitis C    • Adrenal disorder (HCC)    • Aneurysm of unspecified site [facility])    • Benign neoplasm of skin    • Bronchopneumonia    • Chronic hepatitis C (HCC)    • Chronic obstructive asthma (HCC)    • Cirrhosis (HCC)    • Colon, diverticulosis    • COPD (chronic obstructive pulmonary disease) (HCC)    • Depression    • Diabetes mellitus ([facility] [address] )    • Drug [facility], [facility]    • Hyperlipidemia    • Hypertension    • Laennec's cirrhosis (alcoholic) (HCC)    • Malabsorption syndrome    • Mitral valve disorder    • Murmur    • Nonspecific abnormal finding on cardiac evaluation    • Peripheral vascular disease (HCC)    • Pleural effusion    • Pneumonia    • Rectal hemorrhage    • Renal failure    • Respiratory abnormality    • Restrictive lung disease     lung disease other not elsewhere class    • Testicular dysfunction     testicular hypfunction other    • Tricuspid valve disorders, non-rheumatic    • Type 2 diabetes mellitus (HCC)     uncomplicated controlled     • Ventral hernia         Current Medications:     Current Outpatient Medications:   •  atenolol (TENORMIN) 100 mg tablet, TAKE ONE TABLET BY MOUTH TWICE DAILY, Disp: 180 tablet, Rfl: 0  •  fluticasone (FLONASE) 50 mcg/act nasal spray, 1 spray into each nostril daily, Disp: 18 mL, Rfl: 1  •  furosemide (LASIX) 20 mg tablet, Take 1 tablet (20 mg total) by mouth daily (Patient taking differently: Take 20 mg by mouth every other day), Disp: 90 tablet, Rfl: 3  •  gabapentin (NEURONTIN) 600 MG tablet, 1 5 p o  t i d  as directed, Disp: 135 tablet, Rfl: 5  •  NON FORMULARY, Medical Marijuana, Disp: , Rfl:   •  OXcarbazepine (TRILEPTAL) 300 mg tablet, Take 1 tablet (300 mg total) by mouth every 12 (twelve) hours, Disp: 60 tablet, Rfl: 5  •  pantoprazole (PROTONIX) 40 mg tablet, TAKE ONE TABLET BY MOUTH EVERY DAY, Disp: 90 tablet, Rfl: 0  •  pravastatin (PRAVACHOL) 20 mg tablet, TAKE ONE TABLET BY MOUTH EVERY DAY, Disp: 173 tablet, Rfl: 0  •  tacrolimus (PROGRAF) 1 mg capsule, Take by mouth , Disp: , Rfl:      Allergies:      Allergies   Allergen Reactions   • Dust Mite Extract Cough, Eye Swelling, Itching and Wheezing   • Muscle Relief  [Capsaicin] Confusion, Headache, Lightheadedness, Palpitations, Tinnitus and Vomiting   • Other      Muscle relaxer's causes vomiting and nausea    • Grass Extracts [Gramineae Pollens] Hives and Sneezing   • Pollen Extract Hives and Sneezing     Watery Eyes   • Prozac [Fluoxetine]         Physical Exam:     /64 (BP Location: Left arm, Patient Position: Sitting, Cuff Size: Standard)   Pulse 90   Resp 16   Ht 6' 1" (1 854 m)   Wt 69 9 kg (154 lb)   BMI 20 32 kg/m²     Physical Exam  Constitutional:       Appearance: He is well-developed  HENT:      Head: Normocephalic and atraumatic  Eyes:      Conjunctiva/sclera: Conjunctivae normal       Pupils: Pupils are equal, round, and reactive to light  Cardiovascular:      Rate and Rhythm: Normal rate and regular rhythm  Heart sounds: Normal heart sounds  Pulmonary:      Effort: Pulmonary effort is normal       Breath sounds: Normal breath sounds  Abdominal:      General: Bowel sounds are normal       Palpations: Abdomen is soft  Musculoskeletal:         General: Normal range of motion  Cervical back: Normal range of motion  Skin:     General: Skin is warm and dry  Neurological:      Mental Status: He is alert and oriented to person, place, and time  Data:     Laboratory Results: I have personally reviewed the pertinent laboratory results/reports   Radiology/Other Diagnostic Testing Results: I have personally reviewed pertinent reports        JOSE Khan  [facility]

## 2023-01-05 ENCOUNTER — RA CDI HCC (OUTPATIENT)
Dept: OTHER | Facility: HOSPITAL | Age: 68
End: 2023-01-05

## 2023-01-18 ENCOUNTER — TELEPHONE (OUTPATIENT)
Dept: OTHER | Facility: OTHER | Age: 68
End: 2023-01-18

## 2023-03-19 DIAGNOSIS — K21.00 ESOPHAGITIS, REFLUX: ICD-10-CM

## 2023-03-19 DIAGNOSIS — I10 HYPERTENSION, UNSPECIFIED TYPE: ICD-10-CM

## 2023-03-19 RX ORDER — PANTOPRAZOLE SODIUM 40 MG/1
TABLET, DELAYED RELEASE ORAL
Qty: 90 TABLET | Refills: 0 | Status: SHIPPED | OUTPATIENT
Start: 2023-03-19

## 2023-03-19 RX ORDER — ATENOLOL 100 MG/1
TABLET ORAL
Qty: 180 TABLET | Refills: 0 | Status: SHIPPED | OUTPATIENT
Start: 2023-03-19

## 2023-03-28 ENCOUNTER — TELEPHONE (OUTPATIENT)
Dept: GASTROENTEROLOGY | Facility: CLINIC | Age: 68
End: 2023-03-28

## 2023-05-25 ENCOUNTER — TELEPHONE (OUTPATIENT)
Dept: NEUROLOGY | Facility: CLINIC | Age: 68
End: 2023-05-25

## 2023-05-25 NOTE — TELEPHONE ENCOUNTER
Called patient to reschedule June 6th appointment with Dr Charlaine Gosselin as he wont be in office on that day  Unable to leave V-mail  Sent letter to [employer]

## 2023-06-12 DIAGNOSIS — I10 HYPERTENSION, UNSPECIFIED TYPE: ICD-10-CM

## 2023-06-12 DIAGNOSIS — K21.00 ESOPHAGITIS, REFLUX: ICD-10-CM

## 2023-06-12 RX ORDER — ATENOLOL 100 MG/1
TABLET ORAL
Qty: 180 TABLET | Refills: 0 | Status: SHIPPED | OUTPATIENT
Start: 2023-06-12

## 2023-06-12 RX ORDER — PANTOPRAZOLE SODIUM 40 MG/1
TABLET, DELAYED RELEASE ORAL
Qty: 90 TABLET | Refills: 0 | Status: SHIPPED | OUTPATIENT
Start: 2023-06-12

## 2023-11-27 DIAGNOSIS — E78.5 HYPERLIPIDEMIA, UNSPECIFIED HYPERLIPIDEMIA TYPE: ICD-10-CM

## 2023-11-27 RX ORDER — PRAVASTATIN SODIUM 20 MG
20 TABLET ORAL DAILY
Qty: 90 TABLET | Refills: 3 | Status: SHIPPED | OUTPATIENT
Start: 2023-11-27

## 2023-11-27 NOTE — TELEPHONE ENCOUNTER
Patient needs refills to be sent to [facility] on [address]. Called the patient to let him know he is over due for a check up has not been here in about a year, but no one is answering his job line and his cell seems to be disconnected.

## 2023-12-27 ENCOUNTER — TELEPHONE (OUTPATIENT)
Age: 68
End: 2023-12-27

## 2024-03-15 DIAGNOSIS — E78.5 HYPERLIPIDEMIA, UNSPECIFIED HYPERLIPIDEMIA TYPE: ICD-10-CM

## 2024-03-15 DIAGNOSIS — K21.00 ESOPHAGITIS, REFLUX: ICD-10-CM

## 2024-03-15 DIAGNOSIS — G62.9 PERIPHERAL NEUROPATHY: ICD-10-CM

## 2024-03-15 DIAGNOSIS — I10 HYPERTENSION, UNSPECIFIED TYPE: ICD-10-CM

## 2024-03-15 RX ORDER — OXCARBAZEPINE 300 MG/1
300 TABLET, FILM COATED ORAL EVERY 12 HOURS SCHEDULED
Qty: 60 TABLET | Refills: 0 | Status: CANCELLED | OUTPATIENT
Start: 2024-03-15

## 2024-03-15 NOTE — TELEPHONE ENCOUNTER
Reason for call:   [x] Refill   [] Prior Auth  [] Other:     Office:   [] PCP/Provider -   [x] Specialty/Provider - pulm    Medication: pravastatin (PRAVACHOL) 20 mg tablet Take 1 tablet (20 mg total) by mouth daily   #90    pantoprazole (PROTONIX) 40 mg tablet TAKE ONE TABLET BY MOUTH EVERY DAY  #90    atenolol (TENORMIN) 100 mg tablet TAKE ONE TABLET BY MOUTH TWICE DAILY  #180              Pharmacy: [facility] [id number] [phone]     Does the patient have enough for 3 days?   [] Yes   [x] No - Send as HP to POD

## 2024-03-18 RX ORDER — PRAVASTATIN SODIUM 20 MG
20 TABLET ORAL DAILY
Qty: 90 TABLET | Refills: 0 | OUTPATIENT
Start: 2024-03-18

## 2024-03-18 RX ORDER — ATENOLOL 100 MG/1
100 TABLET ORAL 2 TIMES DAILY
Qty: 180 TABLET | Refills: 0 | OUTPATIENT
Start: 2024-03-18

## 2024-03-18 RX ORDER — PANTOPRAZOLE SODIUM 40 MG/1
40 TABLET, DELAYED RELEASE ORAL DAILY
Qty: 90 TABLET | Refills: 0 | OUTPATIENT
Start: 2024-03-18

## 2024-03-18 NOTE — TELEPHONE ENCOUNTER
Left message for pt to schedule needed appt for refills   pt last seen over 4 years ago with rachana and would need consult for future refills

## 2024-04-23 NOTE — TELEPHONE ENCOUNTER
----- Message from Louis Burns MD sent at 12/7/2022  8:10 AM EST -----  Needs routine lab f/u
LVM to schedule routine lab f/u
Scheduled 12/14 kelly
DISCHARGE

## 2024-06-27 ENCOUNTER — VBI (OUTPATIENT)
Dept: ADMINISTRATIVE | Facility: OTHER | Age: 69
End: 2024-06-27

## 2024-06-27 NOTE — TELEPHONE ENCOUNTER
06/27/24 10:52 AM     Chart reviewed for CRC: Colonoscopy ; nothing is submitted to the patient's insurance at this time.     Monserrat Rivera MA   [facility]

## 2024-10-16 ENCOUNTER — TELEPHONE (OUTPATIENT)
Age: 69
End: 2024-10-16

## 2024-12-26 ENCOUNTER — VBI (OUTPATIENT)
Dept: ADMINISTRATIVE | Facility: OTHER | Age: 69
End: 2024-12-26

## 2024-12-27 NOTE — TELEPHONE ENCOUNTER
12/26/24 10:12 PM     Chart reviewed for CRC: Colonoscopy ; nothing is submitted to the patient's insurance at this time.     Monserrat Rivera MA   [facility]

## 2025-02-03 ENCOUNTER — APPOINTMENT (EMERGENCY)
Dept: CT IMAGING | Facility: HOSPITAL | Age: 70
DRG: 917 | End: 2025-02-03
Payer: COMMERCIAL

## 2025-02-03 ENCOUNTER — APPOINTMENT (EMERGENCY)
Dept: RADIOLOGY | Facility: HOSPITAL | Age: 70
DRG: 917 | End: 2025-02-03
Payer: COMMERCIAL

## 2025-02-03 ENCOUNTER — HOSPITAL ENCOUNTER (INPATIENT)
Facility: HOSPITAL | Age: 70
LOS: 3 days | Discharge: HOME | DRG: 917 | End: 2025-02-07
Attending: EMERGENCY MEDICINE | Admitting: STUDENT IN AN ORGANIZED HEALTH CARE EDUCATION/TRAINING PROGRAM
Payer: COMMERCIAL

## 2025-02-03 DIAGNOSIS — D84.9 IMMUNOSUPPRESSION (HCC): ICD-10-CM

## 2025-02-03 DIAGNOSIS — N17.9 AKI (ACUTE KIDNEY INJURY) (HCC): Primary | ICD-10-CM

## 2025-02-03 DIAGNOSIS — E78.5 HYPERLIPIDEMIA, UNSPECIFIED HYPERLIPIDEMIA TYPE: ICD-10-CM

## 2025-02-03 DIAGNOSIS — R41.82 ALTERED MENTAL STATUS: ICD-10-CM

## 2025-02-03 DIAGNOSIS — R09.02 HYPOXIA: ICD-10-CM

## 2025-02-03 DIAGNOSIS — K21.00 ESOPHAGITIS, REFLUX: ICD-10-CM

## 2025-02-03 DIAGNOSIS — Z94.4 LIVER TRANSPLANTED (HCC): ICD-10-CM

## 2025-02-03 DIAGNOSIS — R94.31 PROLONGED Q-T INTERVAL ON ECG: ICD-10-CM

## 2025-02-03 DIAGNOSIS — R78.81 POSITIVE BLOOD CULTURE: ICD-10-CM

## 2025-02-03 DIAGNOSIS — G62.9 PERIPHERAL NEUROPATHY: ICD-10-CM

## 2025-02-03 PROBLEM — G92.8 TOXIC METABOLIC ENCEPHALOPATHY: Status: ACTIVE | Noted: 2025-02-03

## 2025-02-03 PROBLEM — R74.8 ELEVATED CK: Status: ACTIVE | Noted: 2025-02-03

## 2025-02-03 PROBLEM — N18.9 ACUTE KIDNEY INJURY SUPERIMPOSED ON CHRONIC KIDNEY DISEASE  (HCC): Status: ACTIVE | Noted: 2025-02-03

## 2025-02-03 PROBLEM — R93.89 ABNORMAL CT SCAN, CHEST: Status: ACTIVE | Noted: 2025-02-03

## 2025-02-03 PROBLEM — E87.1 HYPONATREMIA: Status: ACTIVE | Noted: 2025-02-03

## 2025-02-03 LAB
2HR DELTA HS TROPONIN: -5 NG/L
4HR DELTA HS TROPONIN: -6 NG/L
ALBUMIN SERPL BCG-MCNC: 4 G/DL (ref 3.5–5)
ALP SERPL-CCNC: 198 U/L (ref 34–104)
ALT SERPL W P-5'-P-CCNC: 37 U/L (ref 7–52)
AMMONIA PLAS-SCNC: 23 UMOL/L (ref 18–72)
AMPHETAMINES SERPL QL SCN: NEGATIVE
ANION GAP SERPL CALCULATED.3IONS-SCNC: 7 MMOL/L (ref 4–13)
APAP SERPL-MCNC: <2 UG/ML (ref 10–20)
AST SERPL W P-5'-P-CCNC: 61 U/L (ref 13–39)
ATRIAL RATE: 67 BPM
BARBITURATES UR QL: NEGATIVE
BASOPHILS # BLD AUTO: 0.06 THOUSANDS/ΜL (ref 0–0.1)
BASOPHILS NFR BLD AUTO: 1 % (ref 0–1)
BENZODIAZ UR QL: NEGATIVE
BILIRUB SERPL-MCNC: 0.49 MG/DL (ref 0.2–1)
BUN SERPL-MCNC: 32 MG/DL (ref 5–25)
CALCIUM SERPL-MCNC: 8.7 MG/DL (ref 8.4–10.2)
CARDIAC TROPONIN I PNL SERPL HS: 23 NG/L (ref ?–50)
CARDIAC TROPONIN I PNL SERPL HS: 24 NG/L (ref ?–50)
CARDIAC TROPONIN I PNL SERPL HS: 29 NG/L (ref ?–50)
CHLORIDE SERPL-SCNC: 98 MMOL/L (ref 96–108)
CK SERPL-CCNC: 350 U/L (ref 39–308)
CO2 SERPL-SCNC: 28 MMOL/L (ref 21–32)
COCAINE UR QL: POSITIVE
CREAT SERPL-MCNC: 1.68 MG/DL (ref 0.6–1.3)
EOSINOPHIL # BLD AUTO: 0.12 THOUSAND/ΜL (ref 0–0.61)
EOSINOPHIL NFR BLD AUTO: 2 % (ref 0–6)
ERYTHROCYTE [DISTWIDTH] IN BLOOD BY AUTOMATED COUNT: 13.7 % (ref 11.6–15.1)
ETHANOL SERPL-MCNC: <10 MG/DL
ETHANOL SERPL-MCNC: <10 MG/DL
FENTANYL UR QL SCN: NEGATIVE
FLUAV RNA RESP QL NAA+PROBE: NEGATIVE
FLUBV RNA RESP QL NAA+PROBE: NEGATIVE
GFR SERPL CREATININE-BSD FRML MDRD: 39 ML/MIN/1.73SQ M
GLUCOSE SERPL-MCNC: 115 MG/DL (ref 65–140)
GLUCOSE SERPL-MCNC: 98 MG/DL (ref 65–140)
HCT VFR BLD AUTO: 41.4 % (ref 36.5–49.3)
HGB BLD-MCNC: 13.3 G/DL (ref 12–17)
HYDROCODONE UR QL SCN: NEGATIVE
IMM GRANULOCYTES # BLD AUTO: 0.02 THOUSAND/UL (ref 0–0.2)
IMM GRANULOCYTES NFR BLD AUTO: 0 % (ref 0–2)
LYMPHOCYTES # BLD AUTO: 2.34 THOUSANDS/ΜL (ref 0.6–4.47)
LYMPHOCYTES NFR BLD AUTO: 39 % (ref 14–44)
MAGNESIUM SERPL-MCNC: 1.9 MG/DL (ref 1.9–2.7)
MCH RBC QN AUTO: 31.1 PG (ref 26.8–34.3)
MCHC RBC AUTO-ENTMCNC: 32.1 G/DL (ref 31.4–37.4)
MCV RBC AUTO: 97 FL (ref 82–98)
METHADONE UR QL: NEGATIVE
MONOCYTES # BLD AUTO: 0.46 THOUSAND/ΜL (ref 0.17–1.22)
MONOCYTES NFR BLD AUTO: 8 % (ref 4–12)
NEUTROPHILS # BLD AUTO: 2.94 THOUSANDS/ΜL (ref 1.85–7.62)
NEUTS SEG NFR BLD AUTO: 50 % (ref 43–75)
NRBC BLD AUTO-RTO: 0 /100 WBCS
OPIATES UR QL SCN: POSITIVE
OXYCODONE+OXYMORPHONE UR QL SCN: POSITIVE
P AXIS: 89 DEGREES
PCP UR QL: NEGATIVE
PLATELET # BLD AUTO: 174 THOUSANDS/UL (ref 149–390)
PMV BLD AUTO: 9.8 FL (ref 8.9–12.7)
POTASSIUM SERPL-SCNC: 3.7 MMOL/L (ref 3.5–5.3)
PR INTERVAL: 206 MS
PROCALCITONIN SERPL-MCNC: 0.68 NG/ML
PROT SERPL-MCNC: 7.5 G/DL (ref 6.4–8.4)
QRS AXIS: 80 DEGREES
QRSD INTERVAL: 152 MS
QT INTERVAL: 468 MS
QTC INTERVAL: 494 MS
RBC # BLD AUTO: 4.28 MILLION/UL (ref 3.88–5.62)
RSV RNA RESP QL NAA+PROBE: NEGATIVE
SALICYLATES SERPL-MCNC: <5 MG/DL (ref 3–20)
SARS-COV-2 RNA RESP QL NAA+PROBE: NEGATIVE
SODIUM SERPL-SCNC: 133 MMOL/L (ref 135–147)
T WAVE AXIS: 74 DEGREES
THC UR QL: POSITIVE
TSH SERPL DL<=0.05 MIU/L-ACNC: 0.95 UIU/ML (ref 0.45–4.5)
VENTRICULAR RATE: 67 BPM
WBC # BLD AUTO: 5.94 THOUSAND/UL (ref 4.31–10.16)

## 2025-02-03 PROCEDURE — 82077 ASSAY SPEC XCP UR&BREATH IA: CPT

## 2025-02-03 PROCEDURE — 71045 X-RAY EXAM CHEST 1 VIEW: CPT

## 2025-02-03 PROCEDURE — 82140 ASSAY OF AMMONIA: CPT | Performed by: EMERGENCY MEDICINE

## 2025-02-03 PROCEDURE — 0241U HB NFCT DS VIR RESP RNA 4 TRGT: CPT | Performed by: EMERGENCY MEDICINE

## 2025-02-03 PROCEDURE — 99223 1ST HOSP IP/OBS HIGH 75: CPT

## 2025-02-03 PROCEDURE — 82948 REAGENT STRIP/BLOOD GLUCOSE: CPT

## 2025-02-03 PROCEDURE — 99285 EMERGENCY DEPT VISIT HI MDM: CPT

## 2025-02-03 PROCEDURE — 84484 ASSAY OF TROPONIN QUANT: CPT

## 2025-02-03 PROCEDURE — 82550 ASSAY OF CK (CPK): CPT | Performed by: EMERGENCY MEDICINE

## 2025-02-03 PROCEDURE — 80307 DRUG TEST PRSMV CHEM ANLYZR: CPT

## 2025-02-03 PROCEDURE — 84484 ASSAY OF TROPONIN QUANT: CPT | Performed by: EMERGENCY MEDICINE

## 2025-02-03 PROCEDURE — 96375 TX/PRO/DX INJ NEW DRUG ADDON: CPT

## 2025-02-03 PROCEDURE — 82077 ASSAY SPEC XCP UR&BREATH IA: CPT | Performed by: EMERGENCY MEDICINE

## 2025-02-03 PROCEDURE — 80179 DRUG ASSAY SALICYLATE: CPT

## 2025-02-03 PROCEDURE — 93010 ELECTROCARDIOGRAM REPORT: CPT | Performed by: INTERNAL MEDICINE

## 2025-02-03 PROCEDURE — 93005 ELECTROCARDIOGRAM TRACING: CPT

## 2025-02-03 PROCEDURE — 80053 COMPREHEN METABOLIC PANEL: CPT | Performed by: EMERGENCY MEDICINE

## 2025-02-03 PROCEDURE — 71250 CT THORAX DX C-: CPT

## 2025-02-03 PROCEDURE — 84443 ASSAY THYROID STIM HORMONE: CPT

## 2025-02-03 PROCEDURE — 99285 EMERGENCY DEPT VISIT HI MDM: CPT | Performed by: EMERGENCY MEDICINE

## 2025-02-03 PROCEDURE — 87186 SC STD MICRODIL/AGAR DIL: CPT | Performed by: EMERGENCY MEDICINE

## 2025-02-03 PROCEDURE — 99449 NTRPROF PH1/NTRNET/EHR 31/>: CPT | Performed by: EMERGENCY MEDICINE

## 2025-02-03 PROCEDURE — 96365 THER/PROPH/DIAG IV INF INIT: CPT

## 2025-02-03 PROCEDURE — 80197 ASSAY OF TACROLIMUS: CPT | Performed by: EMERGENCY MEDICINE

## 2025-02-03 PROCEDURE — 87154 CUL TYP ID BLD PTHGN 6+ TRGT: CPT | Performed by: EMERGENCY MEDICINE

## 2025-02-03 PROCEDURE — 87077 CULTURE AEROBIC IDENTIFY: CPT | Performed by: EMERGENCY MEDICINE

## 2025-02-03 PROCEDURE — 84145 PROCALCITONIN (PCT): CPT

## 2025-02-03 PROCEDURE — 85025 COMPLETE CBC W/AUTO DIFF WBC: CPT | Performed by: EMERGENCY MEDICINE

## 2025-02-03 PROCEDURE — 80143 DRUG ASSAY ACETAMINOPHEN: CPT

## 2025-02-03 PROCEDURE — 87040 BLOOD CULTURE FOR BACTERIA: CPT | Performed by: EMERGENCY MEDICINE

## 2025-02-03 PROCEDURE — 70450 CT HEAD/BRAIN W/O DYE: CPT

## 2025-02-03 PROCEDURE — 83735 ASSAY OF MAGNESIUM: CPT | Performed by: EMERGENCY MEDICINE

## 2025-02-03 PROCEDURE — 36415 COLL VENOUS BLD VENIPUNCTURE: CPT | Performed by: EMERGENCY MEDICINE

## 2025-02-03 RX ORDER — ATENOLOL 25 MG/1
100 TABLET ORAL 2 TIMES DAILY
Status: DISCONTINUED | OUTPATIENT
Start: 2025-02-03 | End: 2025-02-03

## 2025-02-03 RX ORDER — GABAPENTIN 300 MG/1
300 CAPSULE ORAL 3 TIMES DAILY
Status: DISCONTINUED | OUTPATIENT
Start: 2025-02-03 | End: 2025-02-03

## 2025-02-03 RX ORDER — PRAVASTATIN SODIUM 20 MG
20 TABLET ORAL DAILY
Status: DISCONTINUED | OUTPATIENT
Start: 2025-02-04 | End: 2025-02-07 | Stop reason: HOSPADM

## 2025-02-03 RX ORDER — GABAPENTIN 100 MG/1
100 CAPSULE ORAL 3 TIMES DAILY
Status: DISCONTINUED | OUTPATIENT
Start: 2025-02-03 | End: 2025-02-07 | Stop reason: HOSPADM

## 2025-02-03 RX ORDER — LABETALOL HYDROCHLORIDE 5 MG/ML
20 INJECTION, SOLUTION INTRAVENOUS ONCE
Status: DISCONTINUED | OUTPATIENT
Start: 2025-02-03 | End: 2025-02-05

## 2025-02-03 RX ORDER — LABETALOL HYDROCHLORIDE 5 MG/ML
20 INJECTION, SOLUTION INTRAVENOUS ONCE
Status: COMPLETED | OUTPATIENT
Start: 2025-02-03 | End: 2025-02-03

## 2025-02-03 RX ORDER — NALOXONE HYDROCHLORIDE 0.4 MG/ML
INJECTION, SOLUTION INTRAMUSCULAR; INTRAVENOUS; SUBCUTANEOUS
Status: DISPENSED
Start: 2025-02-03 | End: 2025-02-04

## 2025-02-03 RX ORDER — CLONIDINE HYDROCHLORIDE 0.1 MG/1
0.1 TABLET ORAL EVERY 12 HOURS SCHEDULED
Status: DISCONTINUED | OUTPATIENT
Start: 2025-02-03 | End: 2025-02-07 | Stop reason: HOSPADM

## 2025-02-03 RX ORDER — PANTOPRAZOLE SODIUM 40 MG/1
40 TABLET, DELAYED RELEASE ORAL DAILY
Status: DISCONTINUED | OUTPATIENT
Start: 2025-02-04 | End: 2025-02-07 | Stop reason: HOSPADM

## 2025-02-03 RX ORDER — TACROLIMUS 0.5 MG/1
1 CAPSULE ORAL DAILY
Status: DISCONTINUED | OUTPATIENT
Start: 2025-02-04 | End: 2025-02-07 | Stop reason: HOSPADM

## 2025-02-03 RX ORDER — ONDANSETRON 4 MG/1
4 TABLET, ORALLY DISINTEGRATING ORAL EVERY 6 HOURS PRN
Status: DISCONTINUED | OUTPATIENT
Start: 2025-02-03 | End: 2025-02-03

## 2025-02-03 RX ORDER — HEPARIN SODIUM 5000 [USP'U]/ML
5000 INJECTION, SOLUTION INTRAVENOUS; SUBCUTANEOUS EVERY 8 HOURS SCHEDULED
Status: DISCONTINUED | OUTPATIENT
Start: 2025-02-03 | End: 2025-02-07 | Stop reason: HOSPADM

## 2025-02-03 RX ORDER — SODIUM CHLORIDE 9 MG/ML
100 INJECTION, SOLUTION INTRAVENOUS CONTINUOUS
Status: DISCONTINUED | OUTPATIENT
Start: 2025-02-03 | End: 2025-02-04

## 2025-02-03 RX ORDER — ACETAMINOPHEN 325 MG/1
650 TABLET ORAL EVERY 6 HOURS PRN
Status: DISCONTINUED | OUTPATIENT
Start: 2025-02-03 | End: 2025-02-07 | Stop reason: HOSPADM

## 2025-02-03 RX ORDER — OXCARBAZEPINE 150 MG/1
300 TABLET, FILM COATED ORAL EVERY 12 HOURS SCHEDULED
Status: DISCONTINUED | OUTPATIENT
Start: 2025-02-03 | End: 2025-02-07 | Stop reason: HOSPADM

## 2025-02-03 RX ORDER — TACROLIMUS 0.5 MG/1
1 CAPSULE ORAL EVERY 12 HOURS SCHEDULED
Status: DISCONTINUED | OUTPATIENT
Start: 2025-02-03 | End: 2025-02-07 | Stop reason: HOSPADM

## 2025-02-03 RX ADMIN — SODIUM CHLORIDE 100 ML/HR: 0.9 INJECTION, SOLUTION INTRAVENOUS at 20:38

## 2025-02-03 RX ADMIN — LABETALOL HYDROCHLORIDE 20 MG: 5 INJECTION, SOLUTION INTRAVENOUS at 15:28

## 2025-02-03 RX ADMIN — CEFTRIAXONE SODIUM 1000 MG: 10 INJECTION, POWDER, FOR SOLUTION INTRAVENOUS at 17:35

## 2025-02-03 RX ADMIN — HEPARIN SODIUM 5000 UNITS: 5000 INJECTION INTRAVENOUS; SUBCUTANEOUS at 22:10

## 2025-02-03 RX ADMIN — SODIUM CHLORIDE, SODIUM LACTATE, POTASSIUM CHLORIDE, AND CALCIUM CHLORIDE 1000 ML: .6; .31; .03; .02 INJECTION, SOLUTION INTRAVENOUS at 15:29

## 2025-02-03 RX ADMIN — AZITHROMYCIN MONOHYDRATE 500 MG: 500 INJECTION, POWDER, LYOPHILIZED, FOR SOLUTION INTRAVENOUS at 18:02

## 2025-02-03 NOTE — ASSESSMENT & PLAN NOTE
Patient found with altered mental status.  Hardly awakening on pain stimuli and high voice.  When waking up he is mumbling and making noises however not conversant.  Moving all 4 extremities unclear if is in pain.  Concern for substance ingestion questionable cocaine per officers.  Patient received Narcan x2 on the way to the emergency department  Pending UDS  Plan:  Discussed with toxicology appreciated input.  Will continue with IV fluids  Follow-up on UDS  Follow-up on coma panel  Infectious workup with blood culture, UA  Patient received IV ceftriaxone and azithromycin in the ED for the concern for pneumonia.  Pending further workup for pneumonia.  Will keep patient n.p.o. until more awake

## 2025-02-03 NOTE — ASSESSMENT & PLAN NOTE
History of liver transplant currently patient taking tacrolimus.  Can confirm with the patient the tacrolimus dosage however per chart patient takes 2 mg in the morning and 1 mg at night.  Will continue current regimen.  Will check tacrolimus level

## 2025-02-03 NOTE — ASSESSMENT & PLAN NOTE
Lab Results   Component Value Date    EGFR 39 02/03/2025    EGFR 85 12/01/2024    EGFR 76 11/29/2024    CREATININE 1.68 (H) 02/03/2025    CREATININE 0.95 12/01/2024    CREATININE 1.04 11/29/2024     Patient noted with creatinine baseline 1.0-0.9 and on admission creatinine 1.68.  Suspected in the setting prerenal.  Will start by giving IV fluids  Reassess BMP in the morning  Avoid nephrotoxic agents and hypoperfusion

## 2025-02-03 NOTE — TELEMEDICINE
e-Consult (IPC)  - Medical Toxicology   Name: Yousif Weiner 70 y.o. male I MRN: [id number]  Unit/Bed#: ED 17 I Date of Admission: 2/3/2025   Date of Service: 2/3/2025 I Hospital Day: 0  Inpatient consult to Toxicology  Consult performed by: Dandre Mon,   Consult ordered by: Mariella Pascal MD        Physician Requesting Evaluation: Binu Collier MD   Reason for Evaluation / Principal Problem: AMS    Assessment & Plan  SHANTAL (acute kidney injury) (HCC)  Avoid nephrotoxic medications, monitor UOP, trend CK  Altered mental status  Broad differential for both toxicologic and medical causes of encephalopathy. Could be a mixed picture ingestion if did have some response to naloxone. Initially was hypertensive - could be have uncovered mixed ingestion of both opioid and stimulant/sympathomimetic. Other things to consider is withdrawal from benzodiazepines or alcohol, excess serotonin.   Review of PDMP shows RX for oxycodone in 12/2024  When he is improved, would elicit more of a substance use history  Monitor for withdrawal  Primary team mentioned possible ingestion of cocaine, would avoid use of beta blockers for hypertension. Can use benzodiazepines, calcium channel blockers, hydralazine.  Noted to have abnormal movements when awake - possibly crack dancing?  Can check UDS to further eval for cocaine  Mainstay of care is supportive. Benzodiazepines for agitation or seizures. Aspiration precautions with HOB elevated/ Would keep on end tidal if possible while altered  Does have Prolonged QRS and QTC. EKG from 10 years ago does not have widened QTS or RBBB. Cocaine does have sodium channel blocking properties. For QRS >160 would give 1meq/kg boluses of bicarbonate. If QRS >120 with hypotension would administer the same amount of bicarbonate  Would check q4h EKGs to evaluate QRS and QTC  Continuous cardiac monitoring for any wide complex tachycardia. Initial treatment would be bicarbonate. If  refractory then consider 1mg/kg lidocaine  Continued medical workup.  Prolonged Q-T interval on ECG  For QTc >500ms would give 2g IV magnesium  Optimize potassium, calcium, magnesium to top normal  Continuous cardiac monitoring  Serial EKGs until QTc has normalized  Avoid QT prolonging agents        For further questions, please contact the medical  on call via SecureChat between 8am and 9pm. If between 9pm and 8am, please reach out to the [facility] at [phone].     Hx and PE limited by the dynamics of a phone consultation. I have not personally interviewed or evaluated the patient, but only advised based on the information provided to me. Primary provider is responsible for all clinical decisions.     History of Present Illness   Yousif Weiner is a 70 y.o. year old male who presents with AMS. Was transferred to long-term and noted to have change in mentation. Possibly ingested cocaine. Did receive naloxone x2 with reported some improvement. Is not conversant. Makes random  movements when awake. Fund to have mild elevated CK, SHANTAL. Was hypertensive but not tachycardic. Given labetalol.    Historical Information   I have reviewed the patient's PMH, PSH, Social History, Family History, Meds, and Allergies  Social History     Tobacco Use    Smoking status: Every Day     Current packs/day: 0.25     Average packs/day: 0.3 packs/day for 40.0 years (10.0 ttl pk-yrs)     Types: Cigarettes    Smokeless tobacco: Never    Tobacco comments:     5 a day    Vaping Use    Vaping status: Some Days    Substances: Nicotine, Flavoring   Substance and Sexual Activity    Alcohol use: No     Comment: no alcohol use     Drug use: Yes     Types: Marijuana     Comment: medical marijuana    Sexual activity: Never     Family History   Problem Relation Age of Onset    Lung cancer Mother         overlapping sites of lung unspecified laterality     Heart disease Father         cardiac disorder    Stroke Father     Arthritis  Family     Cancer Family     Liver disease Family         chronic    Coronary artery disease Family     Diabetes Family     Hypertension Brother     Hypertension Brother     No Known Problems Brother     No Known Problems Brother        Meds/Allergies   Prior to Admission medications    Medication Sig Start Date End Date Taking? Authorizing Provider   atenolol (TENORMIN) 100 mg tablet TAKE ONE TABLET BY MOUTH TWICE DAILY 6/12/23   Dave Mack MD   fluticasone (FLONASE) 50 mcg/act nasal spray 1 spray into each nostril daily 6/21/22   JOSE Leavitt   furosemide (LASIX) 20 mg tablet Take 1 tablet (20 mg total) by mouth daily  Patient taking differently: Take 20 mg by mouth every other day 11/18/21   JOSE Leavitt   gabapentin (NEURONTIN) 600 MG tablet 1.5 p.o. t.i.d. as directed 6/20/22   Yassine Navarrete MD   ipratropium (ATROVENT) 0.03 % nasal spray 2 sprays into each nostril every 12 (twelve) hours 12/22/22   JOSE Leavitt   neomycin-polymyxin-hydrocortisone (CORTISPORIN) 0.35%-10,000 units/mL-1% otic suspension Administer 3 drops to the right ear every 8 (eight) hours 12/22/22   JOSE Leavitt   NON FORMULARY Medical Marijuana    Historical Provider, MD   OXcarbazepine (TRILEPTAL) 300 mg tablet Take 1 tablet (300 mg total) by mouth every 12 (twelve) hours 12/6/22   Yassine Navarrete MD   pantoprazole (PROTONIX) 40 mg tablet TAKE ONE TABLET BY MOUTH EVERY DAY 6/12/23   Dave Mack MD   pravastatin (PRAVACHOL) 20 mg tablet Take 1 tablet (20 mg total) by mouth daily 11/27/23   Dave Mack MD   predniSONE 10 mg tablet Take 3 tablets for 3 days then 2 tablets for 3 days then 1 tablet for 3 days 12/22/22   JOSE Leavitt   tacrolimus (PROGRAF) 1 mg capsule Take 3 mg by mouth daily 2 mg in the am and 1 mg in the pm 3/4/14   Historical Provider, MD     Current Facility-Administered Medications:     acetaminophen (TYLENOL) tablet 650 mg, 650 mg, Oral, Q6H PRN, Mariella Pascal MD     [Held by provider] atenolol (TENORMIN) tablet 100 mg, 100 mg, Oral, BID, Mariella Pascal MD    azithromycin (ZITHROMAX) 500 mg in sodium chloride 0.9% 250mL IVPB 500 mg, 500 mg, Intravenous, Once, Mariella Pascal MD, 500 mg at 02/03/25 1802    cloNIDine (CATAPRES) tablet 0.1 mg, 0.1 mg, Oral, Q12H OCTAVIO, Mariella Pascal MD    gabapentin (NEURONTIN) tablet 300 mg, 300 mg, Oral, TID, Mariella Pascal MD    heparin (porcine) subcutaneous injection 5,000 Units, 5,000 Units, Subcutaneous, Q8H OCTAVIO, Mariella Pascal MD    labetalol (NORMODYNE) injection 20 mg, 20 mg, Intravenous, Once, Mariella Pascal MD    naloxone (NARCAN) 0.4 mg/mL injection **ADS Override Pull**, , , ,     ondansetron (ZOFRAN-ODT) dispersible tablet 4 mg, 4 mg, Oral, Q6H PRN, Mariella Pascal MD    OXcarbazepine (TRILEPTAL) tablet 300 mg, 300 mg, Oral, Q12H OCTAVIO, Mariella Pascal MD    [START ON 2/4/2025] pantoprazole (PROTONIX) EC tablet 40 mg, 40 mg, Oral, Daily, Mariella Pascal MD    [START ON 2/4/2025] pravastatin (PRAVACHOL) tablet 20 mg, 20 mg, Oral, Daily, Mariella Pascal MD    sodium chloride 0.9 % infusion, 100 mL/hr, Intravenous, Continuous, Mariella Pascal MD    tacrolimus (PROGRAF) capsule 1 mg, 1 mg, Oral, Q12H OCTAVIO, Mariella aPscal MD    [START ON 2/4/2025] tacrolimus (PROGRAF) capsule 1 mg, 1 mg, Oral, Daily, Mariella Pascal MD    Current Outpatient Medications:     atenolol (TENORMIN) 100 mg tablet, TAKE ONE TABLET BY MOUTH TWICE DAILY, Disp: 180 tablet, Rfl: 0    fluticasone (FLONASE) 50 mcg/act nasal spray, 1 spray into each nostril daily, Disp: 18 mL, Rfl: 1    furosemide (LASIX) 20 mg tablet, Take 1 tablet (20 mg total) by mouth daily (Patient taking differently: Take 20 mg by mouth every other day), Disp: 90 tablet, Rfl: 3    gabapentin (NEURONTIN) 600 MG tablet, 1.5 p.o. t.i.d. as directed, Disp: 135 tablet, Rfl: 5    ipratropium (ATROVENT) 0.03 % nasal spray, 2 sprays into each nostril every 12 (twelve) hours,  Disp: 30 mL, Rfl: 2    neomycin-polymyxin-hydrocortisone (CORTISPORIN) 0.35%-10,000 units/mL-1% otic suspension, Administer 3 drops to the right ear every 8 (eight) hours, Disp: 10 mL, Rfl: 0    NON FORMULARY, Medical Marijuana, Disp: , Rfl:     OXcarbazepine (TRILEPTAL) 300 mg tablet, Take 1 tablet (300 mg total) by mouth every 12 (twelve) hours, Disp: 60 tablet, Rfl: 5    pantoprazole (PROTONIX) 40 mg tablet, TAKE ONE TABLET BY MOUTH EVERY DAY, Disp: 90 tablet, Rfl: 0    pravastatin (PRAVACHOL) 20 mg tablet, Take 1 tablet (20 mg total) by mouth daily, Disp: 90 tablet, Rfl: 3    predniSONE 10 mg tablet, Take 3 tablets for 3 days then 2 tablets for 3 days then 1 tablet for 3 days, Disp: 18 tablet, Rfl: 0    tacrolimus (PROGRAF) 1 mg capsule, Take 3 mg by mouth daily 2 mg in the am and 1 mg in the pm, Disp: , Rfl:    Allergies   Allergen Reactions    Dust Mite Extract Cough, Eye Swelling, Itching and Wheezing    Muscle Relief  [Capsaicin] Confusion, Headache, Lightheadedness, Palpitations, Tinnitus and Vomiting    Other      Muscle relaxer's causes vomiting and nausea     Grass Extracts [Gramineae Pollens] Hives and Sneezing    Pollen Extract Hives and Sneezing     Watery Eyes    Prozac [Fluoxetine]        Objective :  HR:  [56-80] 71  BP: (137-203)/(72-90) 159/80  Resp:  [14-24] 19  SpO2:  [95 %-98 %] 95 %  O2 Device: Nasal cannula    No intake or output data in the 24 hours ending 02/03/25 1816    Physical exam not performed.      Lab Results: I have reviewed the following results:  Results from last 7 days   Lab Units 02/03/25  1520   WBC Thousand/uL 5.94   HEMOGLOBIN g/dL 13.3   HEMATOCRIT % 41.4   PLATELETS Thousands/uL 174   SEGS PCT % 50   LYMPHO PCT % 39   MONO PCT % 8   EOS PCT % 2      Results from last 7 days   Lab Units 02/03/25  1520   POTASSIUM mmol/L 3.7   CHLORIDE mmol/L 98   CO2 mmol/L 28   BUN mg/dL 32*   CREATININE mg/dL 1.68*   CALCIUM mg/dL 8.7   ALBUMIN g/dL 4.0   ALK PHOS U/L 198*   ALT U/L 37    AST U/L 61*   MAGNESIUM mg/dL 1.9              Results from last 7 days   Lab Units 02/03/25  1520   HS TNI 0HR ng/L 29          Results from last 7 days   Lab Units 02/03/25  1520   ETHANOL LVL mg/dL <10     Imaging Results Review: I reviewed radiology reports from this admission including: chest xray, CT chest, and CT head.  Other Study Results Review: EKG was personally reviewed and my interpretation is: NSR. HR 67, 1st degree AV block. Axis normal. QTC prolonged. QRS is prolonged. RBBB. No VALARIE..    Administrative Statements   31 + minutes, >50% of the total time devoted to medical consultative verbal/EMR discussion between providers. Written report will be generated in the EMR.    Patient or appropriate family member was verbally informed by Dr. Pascal of this consultative service on their behalf to provide more timely access to specialty care in lieu of an in person consultation. Verbal consent was obtained.

## 2025-02-03 NOTE — H&P
H&P - Hospitalist   Name: Yousif Weiner 70 y.o. male I MRN: [id number]  Unit/Bed#: ED 17 I Date of Admission: 2/3/2025   Date of Service: 2/3/2025 I Hospital Day: 0     Assessment & Plan  Toxic metabolic encephalopathy  Patient found with altered mental status.  Hardly awakening on pain stimuli and high voice.  When waking up he is mumbling and making noises however not conversant.  Moving all 4 extremities unclear if is in pain.  Concern for substance ingestion questionable cocaine per officers.  Patient received Narcan x2 on the way to the emergency department  Pending UDS  Plan:  Discussed with toxicology appreciated input.  Will continue with IV fluids  Follow-up on UDS  Follow-up on coma panel  Infectious workup with blood culture, UA  Patient received IV ceftriaxone and azithromycin in the ED for the concern for pneumonia.  Pending further workup for pneumonia.  Will keep patient n.p.o. until more awake  Liver transplanted (HCC)  History of liver transplant currently patient taking tacrolimus.  Can confirm with the patient the tacrolimus dosage however per chart patient takes 2 mg in the morning and 1 mg at night.  Will continue current regimen.  Will check tacrolimus level  Acute kidney injury superimposed on chronic kidney disease  (HCC)  Lab Results   Component Value Date    EGFR 39 02/03/2025    EGFR 85 12/01/2024    EGFR 76 11/29/2024    CREATININE 1.68 (H) 02/03/2025    CREATININE 0.95 12/01/2024    CREATININE 1.04 11/29/2024     Patient noted with creatinine baseline 1.0-0.9 and on admission creatinine 1.68.  Suspected in the setting prerenal.  Will start by giving IV fluids  Reassess BMP in the morning  Avoid nephrotoxic agents and hypoperfusion  Elevated CK  Elevated CK on admission 300 nontraumatic rhabdomyolysis suspected in the setting of dehydration questionable substance ingestion  Ethanol normal, pending UDS  Will continue with IV fluids monitor daily BMP  Abnormal CT scan, chest  Groundglass  density in the posterior dependent left lower lung for more likely to represent subpleural atelectasis than left lower lobe pneumonia, accounting for the radiographic finding.   If there is continued concern for developing posterior left lower  lobe pneumonia, recommendation is for continued radiographic follow-up including PA and left lateral radiographs.  Pending procalcitonin  Patient received a dose of azithromycin and ceftriaxone in the ED  If procalcitonin elevated x 2 consider continuing with antibiotics otherwise consider discontinuing.  The patient on 2 L O2 however sats 96-98%.  Hyponatremia  Sodium 133 on admission  Questionable if it is in the setting of poor oral intake  Will start IV fluids and reassess BMP in the a.m..      VTE Pharmacologic Prophylaxis: VTE Score: 3 Moderate Risk (Score 3-4) - Pharmacological DVT Prophylaxis Ordered: heparin.  Code Status: Level 1 - Full Code will need to clarify the CODE STATUS with the patient  Discussion with family: Attempted to update  (wife) via phone. Unable to contact.    Anticipated Length of Stay: Patient will be admitted on an observation basis with an anticipated length of stay of less than 2 midnights secondary to altered mental status, SHANTAL.    History of Present Illness   Chief Complaint: Altered mental status    Yousif Weiner is a 70 y.o. male with a PMH of liver cirrhosis s/p transplant, COPD, CKD, hypertension who presents with altered mental status brought from [facility].  According to officers suspicion that patient ingested some substance possible cocaine before being arrested.  On EMS arrival patient received Narcan x 2 with some improvement in mental status.  On arrival to the ED patient still lethargic somehow awakening to stimuli and voice.  Hemodynamically overall stable was noted to be in SHANTAL with mildly elevated CK and 300.  CT showing concern for possible left lower lobe pneumonia.    Review of Systems   Unable to perform ROS:  Mental status change       Historical Information   Past Medical History:   Diagnosis Date    Abnormal electrocardiogram     Abnormal findings on radiological examination of gastrointestinal tract     Abnormal liver enzymes     Acute hepatitis C     Adrenal disorder (HCC)     Aneurysm of unspecified site (HCC)     Benign neoplasm of skin     Bronchopneumonia     Chronic hepatitis C (HCC)     Chronic obstructive asthma (HCC)     Cirrhosis (HCC)     Colon, diverticulosis     COPD (chronic obstructive pulmonary disease) (HCC)     Depression     Diabetes mellitus (HCC)     Drug dependence, continuous abuse (HCC)     Hyperlipidemia     Hypertension     Laennec's cirrhosis (alcoholic) (HCC)     Malabsorption syndrome     Mitral valve disorder     Murmur     Nonspecific abnormal finding on cardiac evaluation     Peripheral vascular disease (HCC)     Pleural effusion     Pneumonia     Rectal hemorrhage     Renal failure     Respiratory abnormality     Restrictive lung disease     lung disease other not elsewhere class     Testicular dysfunction     testicular hypfunction other     Tricuspid valve disorders, non-rheumatic     Type 2 diabetes mellitus (HCC)     uncomplicated controlled      Ventral hernia      Past Surgical History:   Procedure Laterality Date    CHOLECYSTECTOMY      GALLBLADDER SURGERY      HEPATITIS B SURFACE AB QN,LIVER TRANSPLANT (HISTORICAL)      HERNIA REPAIR      JOINT REPLACEMENT      L knee 3/10/20    KNEE SURGERY Left 03/2020    WOUND DEBRIDEMENT Left 4/10/2020    Procedure: EXCISIONAL DEBRIDEMENT;  Surgeon: Prashant Pappas MD;  Location: [facility];  Service: General     Social History     Tobacco Use    Smoking status: Every Day     Current packs/day: 0.25     Average packs/day: 0.3 packs/day for 40.0 years (10.0 ttl pk-yrs)     Types: Cigarettes    Smokeless tobacco: Never    Tobacco comments:     5 a day    Vaping Use    Vaping status: Some Days    Substances: Nicotine, Flavoring   Substance  and Sexual Activity    Alcohol use: No     Comment: no alcohol use     Drug use: Yes     Types: Marijuana     Comment: medical marijuana    Sexual activity: Never     E-Cigarette/Vaping    E-Cigarette Use Current Some Day User     Comments Vaping      E-Cigarette/Vaping Substances    Nicotine Yes     THC No     CBD No     Flavoring Yes     Other No     Unknown No        Social History:  Marital Status:    Occupation:   Patient Pre-hospital Living Situation: Home  Patient Pre-hospital Level of Mobility: walks  Patient Pre-hospital Diet Restrictions:     Meds/Allergies   I have been unable to obtain / verify an up to date medication list despite all reasonable attempts.  Prior to Admission medications    Medication Sig Start Date End Date Taking? Authorizing Provider   atenolol (TENORMIN) 100 mg tablet TAKE ONE TABLET BY MOUTH TWICE DAILY 6/12/23   Dave Mack MD   fluticasone (FLONASE) 50 mcg/act nasal spray 1 spray into each nostril daily 6/21/22   JOSE Leavitt   furosemide (LASIX) 20 mg tablet Take 1 tablet (20 mg total) by mouth daily  Patient taking differently: Take 20 mg by mouth every other day 11/18/21   JOSE Leavitt   gabapentin (NEURONTIN) 600 MG tablet 1.5 p.o. t.i.d. as directed 6/20/22   Yassine Navarrete MD   ipratropium (ATROVENT) 0.03 % nasal spray 2 sprays into each nostril every 12 (twelve) hours 12/22/22   JOSE Leavitt   neomycin-polymyxin-hydrocortisone (CORTISPORIN) 0.35%-10,000 units/mL-1% otic suspension Administer 3 drops to the right ear every 8 (eight) hours 12/22/22   JOSE Leavitt   NON FORMULARY Medical Marijuana    Historical Provider, MD   OXcarbazepine (TRILEPTAL) 300 mg tablet Take 1 tablet (300 mg total) by mouth every 12 (twelve) hours 12/6/22   Yassine Navarrete MD   pantoprazole (PROTONIX) 40 mg tablet TAKE ONE TABLET BY MOUTH EVERY DAY 6/12/23   Dave Mack MD   pravastatin (PRAVACHOL) 20 mg tablet Take 1 tablet (20 mg total) by mouth daily  11/27/23   Dave Mack MD   predniSONE 10 mg tablet Take 3 tablets for 3 days then 2 tablets for 3 days then 1 tablet for 3 days 12/22/22   JOSE Leavitt   tacrolimus (PROGRAF) 1 mg capsule Take 3 mg by mouth daily 2 mg in the am and 1 mg in the pm 3/4/14   Historical Provider, MD     Allergies   Allergen Reactions    Dust Mite Extract Cough, Eye Swelling, Itching and Wheezing    Muscle Relief  [Capsaicin] Confusion, Headache, Lightheadedness, Palpitations, Tinnitus and Vomiting    Other      Muscle relaxer's causes vomiting and nausea     Grass Extracts [Gramineae Pollens] Hives and Sneezing    Pollen Extract Hives and Sneezing     Watery Eyes    Prozac [Fluoxetine]        Objective :  HR:  [56-80] 71  BP: (137-203)/(72-90) 159/80  Resp:  [14-24] 19  SpO2:  [95 %-98 %] 95 %  O2 Device: Nasal cannula    Physical Exam  Vitals and nursing note reviewed.   Constitutional:       General: He is not in acute distress.     Appearance: He is well-developed and underweight. He is ill-appearing.   HENT:      Head: Normocephalic and atraumatic.   Eyes:      Conjunctiva/sclera: Conjunctivae normal.   Cardiovascular:      Rate and Rhythm: Normal rate and regular rhythm.      Heart sounds: No murmur heard.  Pulmonary:      Effort: Pulmonary effort is normal. No respiratory distress.      Breath sounds: Normal breath sounds.   Abdominal:      Palpations: Abdomen is soft.      Tenderness: There is no abdominal tenderness.   Musculoskeletal:         General: No swelling.      Cervical back: Neck supple.   Skin:     General: Skin is warm and dry.      Capillary Refill: Capillary refill takes less than 2 seconds.      Findings: Lesion and rash (On the face and upper extremities) present.   Neurological:      Mental Status: He is alert.   Psychiatric:         Mood and Affect: Mood normal.         Behavior: Behavior is uncooperative.          Lines/Drains:            Lab Results: I have reviewed the following results:  Results  from last 7 days   Lab Units 02/03/25  1520   WBC Thousand/uL 5.94   HEMOGLOBIN g/dL 13.3   HEMATOCRIT % 41.4   PLATELETS Thousands/uL 174   SEGS PCT % 50   LYMPHO PCT % 39   MONO PCT % 8   EOS PCT % 2     Results from last 7 days   Lab Units 02/03/25  1520   SODIUM mmol/L 133*   POTASSIUM mmol/L 3.7   CHLORIDE mmol/L 98   CO2 mmol/L 28   BUN mg/dL 32*   CREATININE mg/dL 1.68*   ANION GAP mmol/L 7   CALCIUM mg/dL 8.7   ALBUMIN g/dL 4.0   TOTAL BILIRUBIN mg/dL 0.49   ALK PHOS U/L 198*   ALT U/L 37   AST U/L 61*   GLUCOSE RANDOM mg/dL 98         Results from last 7 days   Lab Units 02/03/25  1509   POC GLUCOSE mg/dl 115     Lab Results   Component Value Date    HGBA1C 6.3 (H) 11/22/2024    HGBA1C 6.3 (H) 10/23/2024    HGBA1C 6.0 (H) 12/06/2022           Imaging Results Review: I reviewed radiology reports from this admission including: CT chest.  Other Study Results Review: EKG was reviewed.     Administrative Statements       ** Please Note: This note has been constructed using a voice recognition system. **

## 2025-02-03 NOTE — ASSESSMENT & PLAN NOTE
Groundglass density in the posterior dependent left lower lung for more likely to represent subpleural atelectasis than left lower lobe pneumonia, accounting for the radiographic finding.   If there is continued concern for developing posterior left lower  lobe pneumonia, recommendation is for continued radiographic follow-up including PA and left lateral radiographs.  Pending procalcitonin  Patient received a dose of azithromycin and ceftriaxone in the ED  If procalcitonin elevated x 2 consider continuing with antibiotics otherwise consider discontinuing.  The patient on 2 L O2 however sats 96-98%.

## 2025-02-03 NOTE — ASSESSMENT & PLAN NOTE
Elevated CK on admission 300 nontraumatic rhabdomyolysis suspected in the setting of dehydration questionable substance ingestion  Ethanol normal, pending UDS  Will continue with IV fluids monitor daily BMP

## 2025-02-03 NOTE — ED PROVIDER NOTES
ED Disposition       None          Assessment & Plan       Medical Decision Making  Patient is a 70-year-old male past medical history of hypertension, hyperlipidemia, COPD, CAD, CHF, liver transplant, HCV, diabetes, CKD stage III presenting for unresponsive episode.  Patient is responding to voice, mild physical stimulation appears to be moving all extremities with intact extraocular muscle movement, pupils equal and reactive with no signs of trauma, no obvious tenderness, sleepy but arousable.  Currently hypertensive and will give labetalol in setting of possible hypertensive emergency, obtain CT head to rule out edema, mass, hemorrhage, labs to assess for electrolyte abnormalities, anemia, SHANTAL, urine drug screen and alcohol to assess for intoxicants, EKG and troponin to rule out ACS or arrhythmia and will continue to monitor at this time as patient is respirating appropriately on his own and is easily rousable.    Amount and/or Complexity of Data Reviewed  Labs: ordered.  Radiology: ordered.    Risk  Prescription drug management.        ED Course as of 02/03/25 2202   Mon Feb 03, 2025   1555 Patient with SHANTAL and abnormal chest x-ray, will obtain CT chest.   1640 Patient is requiring oxygen, was saturating in the high 80s on room air, unclear whether this is  due to possible pneumonia on CT versus somnolence, still rousable to voice and light physical stimulation, will give antibiotics and admit.       Medications   naloxone (NARCAN) 0.4 mg/mL injection **ADS Override Pull** (has no administration in time range)   lactated ringers bolus 1,000 mL (has no administration in time range)       ED Risk Strat Scores                                              History of Present Illness       Chief Complaint   Patient presents with   • Overdose - Accidental     Patient arrives from [facility]. Per CO's/EMS, patient was completing intake when he went unresponsive. Pt was given a dose of narcan, and regained responsiveness.  Patient denies any drug use. Pt able to respond to questions.        Past Medical History:   Diagnosis Date   • Abnormal electrocardiogram    • Abnormal findings on radiological examination of gastrointestinal tract    • Abnormal liver enzymes    • Acute hepatitis C    • Adrenal disorder (HCC)    • Aneurysm of unspecified site (HCC)    • Benign neoplasm of skin    • Bronchopneumonia    • Chronic hepatitis C (HCC)    • Chronic obstructive asthma (HCC)    • Cirrhosis (HCC)    • Colon, diverticulosis    • COPD (chronic obstructive pulmonary disease) (HCC)    • Depression    • Diabetes mellitus (HCC)    • Drug dependence, continuous abuse (HCC)    • Hyperlipidemia    • Hypertension    • Laennec's cirrhosis (alcoholic) (HCC)    • Malabsorption syndrome    • Mitral valve disorder    • Murmur    • Nonspecific abnormal finding on cardiac evaluation    • Peripheral vascular disease (HCC)    • Pleural effusion    • Pneumonia    • Rectal hemorrhage    • Renal failure    • Respiratory abnormality    • Restrictive lung disease     lung disease other not elsewhere class    • Testicular dysfunction     testicular hypfunction other    • Tricuspid valve disorders, non-rheumatic    • Type 2 diabetes mellitus (HCC)     uncomplicated controlled     • Ventral hernia       Past Surgical History:   Procedure Laterality Date   • CHOLECYSTECTOMY     • GALLBLADDER SURGERY     • HEPATITIS B SURFACE AB QN,LIVER TRANSPLANT (HISTORICAL)     • HERNIA REPAIR     • JOINT REPLACEMENT      L knee 3/10/20   • KNEE SURGERY Left 03/2020   • WOUND DEBRIDEMENT Left 4/10/2020    Procedure: EXCISIONAL DEBRIDEMENT;  Surgeon: Prashant Pappas MD;  Location: [facility];  Service: General      Family History   Problem Relation Age of Onset   • Lung cancer Mother         overlapping sites of lung unspecified laterality    • Heart disease Father         cardiac disorder   • Stroke Father    • Arthritis Family    • Cancer Family    • Liver disease Family          "chronic   • Coronary artery disease Family    • Diabetes Family    • Hypertension Brother    • Hypertension Brother    • No Known Problems Brother    • No Known Problems Brother       Social History     Tobacco Use   • Smoking status: Every Day     Current packs/day: 0.25     Average packs/day: 0.3 packs/day for 40.0 years (10.0 ttl pk-yrs)     Types: Cigarettes   • Smokeless tobacco: Never   • Tobacco comments:     5 a day    Vaping Use   • Vaping status: Some Days   • Substances: Nicotine, Flavoring   Substance Use Topics   • Alcohol use: No     Comment: no alcohol use    • Drug use: Yes     Types: Marijuana     Comment: medical marijuana      E-Cigarette/Vaping   • E-Cigarette Use Current Some Day User    • Comments Vaping       E-Cigarette/Vaping Substances   • Nicotine Yes    • THC No    • CBD No    • Flavoring Yes    • Other No    • Unknown No       I have reviewed and agree with the history as documented.     Patient is a 70-year-old male past medical history of liver transplant, hepatitis C, hypertension, hyperlipidemia, diabetes, CAD, COPD, CHF, CKD stage III presenting for unresponsive episode.  [facility].  Was given Narcan and did regain consciousness and was conscious for EMS.  Has been starting to become less awake on route.  Patient states he smoked marijuana today and begins to say \"and\" before falling asleep.  Patient denies other drug use.        Review of Systems   Unable to perform ROS: Mental status change           Objective       ED Triage Vitals [02/03/25 1458]   Temp Pulse Blood Pressure Respirations SpO2 Patient Position - Orthostatic VS   -- 76 (!) 198/90 14 98 % Lying      Temp src Heart Rate Source BP Location FiO2 (%) Pain Score    -- Monitor Right arm -- --      Vitals      Date and Time Temp Pulse SpO2 Resp BP Pain Score FACES Pain Rating User   02/03/25 1458 -- 76 98 % 14 198/90 -- -- VMW            Physical Exam  Vitals " reviewed.   Constitutional:       General: He is not in acute distress.     Appearance: Normal appearance. He is not ill-appearing.   HENT:      Head: Normocephalic and atraumatic.      Mouth/Throat:      Mouth: Mucous membranes are moist.   Eyes:      Extraocular Movements: Extraocular movements intact.      Conjunctiva/sclera: Conjunctivae normal.      Pupils: Pupils are equal, round, and reactive to light.   Cardiovascular:      Rate and Rhythm: Normal rate and regular rhythm.      Heart sounds: Normal heart sounds.   Pulmonary:      Effort: Pulmonary effort is normal.      Breath sounds: Normal breath sounds.   Chest:      Chest wall: No tenderness.   Abdominal:      General: Abdomen is flat.      Palpations: Abdomen is soft.      Tenderness: There is no abdominal tenderness.   Musculoskeletal:         General: No swelling. Normal range of motion.      Cervical back: Neck supple.      Comments: No signs of trauma to the back   Skin:     General: Skin is warm and dry.   Neurological:      General: No focal deficit present.      Mental Status: He is alert.      Comments: All extremities, diffuse mild tremor arousing easily to voice, mild stimulation   Psychiatric:         Mood and Affect: Mood normal.         Results Reviewed       Procedure Component Value Units Date/Time    Fingerstick Glucose (POCT) [[id number]]  (Normal) Collected: 02/03/25 1509    Lab Status: Final result Specimen: Blood Updated: 02/03/25 1510     POC Glucose 115 mg/dl             No orders to display       ECG 12 Lead Documentation Only    Date/Time: 2/3/2025 3:13 PM    Performed by: Pooja Stevens DO  Authorized by: Pooja Stevens DO    Patient location:  ED  Previous ECG:     Previous ECG:  Compared to current    Comparison ECG info:  Mild anterior T wave flattening  Interpretation:     Interpretation: non-specific    Rate:     ECG rate assessment: normal    Rhythm:     Rhythm: sinus rhythm    Ectopy:     Ectopy: none    QRS:      QRS axis:  Normal    QRS intervals:  Normal  Conduction:     Conduction: abnormal      Abnormal conduction: complete RBBB and 1st degree    ST segments:     ST segments:  Normal  T waves:     T waves: non-specific        ED Medication and Procedure Management   Prior to Admission Medications   Prescriptions Last Dose Informant Patient Reported? Taking?   NON FORMULARY  Self Yes No   Sig: Medical Marijuana   OXcarbazepine (TRILEPTAL) 300 mg tablet   No No   Sig: Take 1 tablet (300 mg total) by mouth every 12 (twelve) hours   atenolol (TENORMIN) 100 mg tablet   No No   Sig: TAKE ONE TABLET BY MOUTH TWICE DAILY   fluticasone (FLONASE) 50 mcg/act nasal spray   No No   Si spray into each nostril daily   furosemide (LASIX) 20 mg tablet  Self No No   Sig: Take 1 tablet (20 mg total) by mouth daily   Patient taking differently: Take 20 mg by mouth every other day   gabapentin (NEURONTIN) 600 MG tablet   No No   Si.5 p.o. t.i.d. as directed   ipratropium (ATROVENT) 0.03 % nasal spray   No No   Si sprays into each nostril every 12 (twelve) hours   neomycin-polymyxin-hydrocortisone (CORTISPORIN) 0.35%-10,000 units/mL-1% otic suspension   No No   Sig: Administer 3 drops to the right ear every 8 (eight) hours   pantoprazole (PROTONIX) 40 mg tablet   No No   Sig: TAKE ONE TABLET BY MOUTH EVERY DAY   pravastatin (PRAVACHOL) 20 mg tablet   No No   Sig: Take 1 tablet (20 mg total) by mouth daily   predniSONE 10 mg tablet   No No   Sig: Take 3 tablets for 3 days then 2 tablets for 3 days then 1 tablet for 3 days   tacrolimus (PROGRAF) 1 mg capsule  Self Yes No   Sig: Take by mouth       Facility-Administered Medications: None     Patient's Medications   Discharge Prescriptions    No medications on file     No discharge procedures on file.  ED SEPSIS DOCUMENTATION            Pooja Stevens DO  [phone]

## 2025-02-03 NOTE — ASSESSMENT & PLAN NOTE
Sodium 133 on admission  Questionable if it is in the setting of poor oral intake  Will start IV fluids and reassess BMP in the a.m..

## 2025-02-04 PROBLEM — R78.81 POSITIVE BLOOD CULTURE: Status: ACTIVE | Noted: 2025-02-04

## 2025-02-04 LAB
ALBUMIN SERPL BCG-MCNC: 3.1 G/DL (ref 3.5–5)
ALP SERPL-CCNC: 143 U/L (ref 34–104)
ALT SERPL W P-5'-P-CCNC: 24 U/L (ref 7–52)
ANION GAP SERPL CALCULATED.3IONS-SCNC: 8 MMOL/L (ref 4–13)
AST SERPL W P-5'-P-CCNC: 29 U/L (ref 13–39)
ATRIAL RATE: 53 BPM
ATRIAL RATE: 62 BPM
BASOPHILS # BLD AUTO: 0.05 THOUSANDS/ΜL (ref 0–0.1)
BASOPHILS NFR BLD AUTO: 1 % (ref 0–1)
BILIRUB SERPL-MCNC: 0.67 MG/DL (ref 0.2–1)
BUN SERPL-MCNC: 22 MG/DL (ref 5–25)
CALCIUM ALBUM COR SERPL-MCNC: 8.5 MG/DL (ref 8.3–10.1)
CALCIUM SERPL-MCNC: 7.8 MG/DL (ref 8.4–10.2)
CHLORIDE SERPL-SCNC: 105 MMOL/L (ref 96–108)
CK SERPL-CCNC: 157 U/L (ref 39–308)
CO2 SERPL-SCNC: 24 MMOL/L (ref 21–32)
CREAT SERPL-MCNC: 1.03 MG/DL (ref 0.6–1.3)
EOSINOPHIL # BLD AUTO: 0.12 THOUSAND/ΜL (ref 0–0.61)
EOSINOPHIL NFR BLD AUTO: 3 % (ref 0–6)
ERYTHROCYTE [DISTWIDTH] IN BLOOD BY AUTOMATED COUNT: 13.6 % (ref 11.6–15.1)
GFR SERPL CREATININE-BSD FRML MDRD: 72 ML/MIN/1.73SQ M
GLUCOSE P FAST SERPL-MCNC: 85 MG/DL (ref 65–99)
GLUCOSE SERPL-MCNC: 85 MG/DL (ref 65–140)
HCT VFR BLD AUTO: 36 % (ref 36.5–49.3)
HGB BLD-MCNC: 11.9 G/DL (ref 12–17)
IMM GRANULOCYTES # BLD AUTO: 0.01 THOUSAND/UL (ref 0–0.2)
IMM GRANULOCYTES NFR BLD AUTO: 0 % (ref 0–2)
LYMPHOCYTES # BLD AUTO: 1.85 THOUSANDS/ΜL (ref 0.6–4.47)
LYMPHOCYTES NFR BLD AUTO: 40 % (ref 14–44)
MCH RBC QN AUTO: 30.7 PG (ref 26.8–34.3)
MCHC RBC AUTO-ENTMCNC: 33.1 G/DL (ref 31.4–37.4)
MCV RBC AUTO: 93 FL (ref 82–98)
MONOCYTES # BLD AUTO: 0.39 THOUSAND/ΜL (ref 0.17–1.22)
MONOCYTES NFR BLD AUTO: 8 % (ref 4–12)
NEUTROPHILS # BLD AUTO: 2.2 THOUSANDS/ΜL (ref 1.85–7.62)
NEUTS SEG NFR BLD AUTO: 48 % (ref 43–75)
NRBC BLD AUTO-RTO: 0 /100 WBCS
P AXIS: 85 DEGREES
P AXIS: 94 DEGREES
PLATELET # BLD AUTO: 188 THOUSANDS/UL (ref 149–390)
PMV BLD AUTO: 10.2 FL (ref 8.9–12.7)
POTASSIUM SERPL-SCNC: 3.5 MMOL/L (ref 3.5–5.3)
PR INTERVAL: 190 MS
PR INTERVAL: 194 MS
PROT SERPL-MCNC: 5.9 G/DL (ref 6.4–8.4)
QRS AXIS: 62 DEGREES
QRS AXIS: 64 DEGREES
QRSD INTERVAL: 148 MS
QRSD INTERVAL: 150 MS
QT INTERVAL: 516 MS
QT INTERVAL: 540 MS
QTC INTERVAL: 508 MS
QTC INTERVAL: 525 MS
RBC # BLD AUTO: 3.87 MILLION/UL (ref 3.88–5.62)
SODIUM SERPL-SCNC: 137 MMOL/L (ref 135–147)
T WAVE AXIS: 64 DEGREES
T WAVE AXIS: 66 DEGREES
TACROLIMUS BLD-MCNC: <1 NG/ML (ref 3–15)
VENTRICULAR RATE: 53 BPM
VENTRICULAR RATE: 62 BPM
WBC # BLD AUTO: 4.62 THOUSAND/UL (ref 4.31–10.16)

## 2025-02-04 PROCEDURE — 93005 ELECTROCARDIOGRAM TRACING: CPT

## 2025-02-04 PROCEDURE — 93010 ELECTROCARDIOGRAM REPORT: CPT | Performed by: INTERNAL MEDICINE

## 2025-02-04 PROCEDURE — 80053 COMPREHEN METABOLIC PANEL: CPT

## 2025-02-04 PROCEDURE — 82550 ASSAY OF CK (CPK): CPT

## 2025-02-04 PROCEDURE — 87040 BLOOD CULTURE FOR BACTERIA: CPT | Performed by: STUDENT IN AN ORGANIZED HEALTH CARE EDUCATION/TRAINING PROGRAM

## 2025-02-04 PROCEDURE — 85025 COMPLETE CBC W/AUTO DIFF WBC: CPT

## 2025-02-04 PROCEDURE — 99232 SBSQ HOSP IP/OBS MODERATE 35: CPT | Performed by: STUDENT IN AN ORGANIZED HEALTH CARE EDUCATION/TRAINING PROGRAM

## 2025-02-04 RX ORDER — VANCOMYCIN HYDROCHLORIDE 750 MG/150ML
750 INJECTION, SOLUTION INTRAVENOUS EVERY 12 HOURS
Status: DISCONTINUED | OUTPATIENT
Start: 2025-02-05 | End: 2025-02-04

## 2025-02-04 RX ORDER — MAGNESIUM SULFATE HEPTAHYDRATE 40 MG/ML
2 INJECTION, SOLUTION INTRAVENOUS ONCE
Status: COMPLETED | OUTPATIENT
Start: 2025-02-04 | End: 2025-02-04

## 2025-02-04 RX ADMIN — PRAVASTATIN SODIUM 20 MG: 20 TABLET ORAL at 09:15

## 2025-02-04 RX ADMIN — CLONIDINE HYDROCHLORIDE 0.1 MG: 0.1 TABLET ORAL at 09:15

## 2025-02-04 RX ADMIN — SODIUM CHLORIDE 100 ML/HR: 0.9 INJECTION, SOLUTION INTRAVENOUS at 02:48

## 2025-02-04 RX ADMIN — OXCARBAZEPINE 300 MG: 150 TABLET, FILM COATED ORAL at 09:14

## 2025-02-04 RX ADMIN — GABAPENTIN 100 MG: 100 CAPSULE ORAL at 16:21

## 2025-02-04 RX ADMIN — GABAPENTIN 100 MG: 100 CAPSULE ORAL at 21:55

## 2025-02-04 RX ADMIN — HEPARIN SODIUM 5000 UNITS: 5000 INJECTION INTRAVENOUS; SUBCUTANEOUS at 05:35

## 2025-02-04 RX ADMIN — OXCARBAZEPINE 300 MG: 150 TABLET, FILM COATED ORAL at 21:54

## 2025-02-04 RX ADMIN — TACROLIMUS 1 MG: 0.5 CAPSULE ORAL at 21:55

## 2025-02-04 RX ADMIN — HEPARIN SODIUM 5000 UNITS: 5000 INJECTION INTRAVENOUS; SUBCUTANEOUS at 14:29

## 2025-02-04 RX ADMIN — PANTOPRAZOLE SODIUM 40 MG: 40 TABLET, DELAYED RELEASE ORAL at 09:15

## 2025-02-04 RX ADMIN — HEPARIN SODIUM 5000 UNITS: 5000 INJECTION INTRAVENOUS; SUBCUTANEOUS at 21:55

## 2025-02-04 RX ADMIN — GABAPENTIN 100 MG: 100 CAPSULE ORAL at 09:15

## 2025-02-04 RX ADMIN — CLONIDINE HYDROCHLORIDE 0.1 MG: 0.1 TABLET ORAL at 21:55

## 2025-02-04 RX ADMIN — TACROLIMUS 1 MG: 0.5 CAPSULE ORAL at 09:14

## 2025-02-04 RX ADMIN — MAGNESIUM SULFATE HEPTAHYDRATE 2 G: 40 INJECTION, SOLUTION INTRAVENOUS at 14:29

## 2025-02-04 NOTE — ASSESSMENT & PLAN NOTE
History of liver transplant-is supposed to be on tacrolimus however he says he cannot afford and has not been taking it for at least a week  Will continue here  Discussed with case management will need reliable way for patient to get this once discharged

## 2025-02-04 NOTE — PROGRESS NOTES
Yousif Weiner is a 70 y.o. male who is currently ordered Vancomycin IV with management by the Pharmacy Consult service.  Relevant clinical data and objective / subjective history reviewed.  Vancomycin Assessment:  Indication and Goal AUC/Trough: Bacteremia (goal -600, trough >10), -600, trough >10  Clinical Status: New Start  Micro:     Renal Function:  SCr: 1.03 mg/dL  CrCl: 65.9 mL/min  Renal replacement: Not on dialysis  Days of Therapy: 1  Current Dose: 1000mg IV q12h  Vancomycin Plan:  New Dosinmg LDx1, then 750mg IV q12h  Estimated AUC: 480 mcg*hr/mL  Estimated Trough: 14.3 mcg/mL  Next Level: 25 with AM labs  Renal Function Monitoring: Daily BMP and UOP  Pharmacy will continue to follow closely for s/sx of nephrotoxicity, infusion reactions and appropriateness of therapy.  BMP and CBC will be ordered per protocol. We will continue to follow the patient’s culture results and clinical progress daily.    Cecile Krisnha, Pharmacist

## 2025-02-04 NOTE — ASSESSMENT & PLAN NOTE
Sodium 133 on admission, now 137 with fluids  Continue to trend, appears to be chronically hyponatremic

## 2025-02-04 NOTE — PLAN OF CARE
Problem: PAIN - ADULT  Goal: Verbalizes/displays adequate comfort level or baseline comfort level  Description: Interventions:  - Encourage patient to monitor pain and request assistance  - Assess pain using appropriate pain scale  - Administer analgesics based on type and severity of pain and evaluate response  - Implement non-pharmacological measures as appropriate and evaluate response  - Consider cultural and social influences on pain and pain management  - Notify physician/advanced practitioner if interventions unsuccessful or patient reports new pain  Outcome: Progressing     Problem: INFECTION - ADULT  Goal: Absence or prevention of progression during hospitalization  Description: INTERVENTIONS:  - Assess and monitor for signs and symptoms of infection  - Monitor lab/diagnostic results  - Monitor all insertion sites, i.e. indwelling lines, tubes, and drains  - Monitor endotracheal if appropriate and nasal secretions for changes in amount and color  - [place] appropriate cooling/warming therapies per order  - Administer medications as ordered  - Instruct and encourage patient and family to use good hand hygiene technique  - Identify and instruct in appropriate isolation precautions for identified infection/condition  Outcome: Progressing     Problem: Knowledge Deficit  Goal: Patient/family/caregiver demonstrates understanding of disease process, treatment plan, medications, and discharge instructions  Description: Complete learning assessment and assess knowledge base.  Interventions:  - Provide teaching at level of understanding  - Provide teaching via preferred learning methods  Outcome: Not Progressing     Problem: Nutrition/Hydration-ADULT  Goal: Nutrient/Hydration intake appropriate for improving, restoring or maintaining nutritional needs  Description: Monitor and assess patient's nutrition/hydration status for malnutrition. Collaborate with interdisciplinary team and initiate plan and interventions  as ordered.  Monitor patient's weight and dietary intake as ordered or per policy. Utilize nutrition screening tool and intervene as necessary. Determine patient's food preferences and provide high-protein, high-caloric foods as appropriate.     INTERVENTIONS:  - Monitor oral intake, urinary output, labs, and treatment plans  - Assess nutrition and hydration status and recommend course of action  - Evaluate amount of meals eaten  - Assist patient with eating if necessary   - Allow adequate time for meals  - Recommend/ encourage appropriate diets, oral nutritional supplements, and vitamin/mineral supplements  - Order, calculate, and assess calorie counts as needed  - Recommend, monitor, and adjust tube feedings and TPN/PPN based on assessed needs  - Assess need for intravenous fluids  - Provide specific nutrition/hydration education as appropriate  - Include patient/family/caregiver in decisions related to nutrition  Outcome: Not Progressing

## 2025-02-04 NOTE — PLAN OF CARE
Problem: PAIN - ADULT  Goal: Verbalizes/displays adequate comfort level or baseline comfort level  Description: Interventions:  - Encourage patient to monitor pain and request assistance  - Assess pain using appropriate pain scale  - Administer analgesics based on type and severity of pain and evaluate response  - Implement non-pharmacological measures as appropriate and evaluate response  - Consider cultural and social influences on pain and pain management  - Notify physician/advanced practitioner if interventions unsuccessful or patient reports new pain  Outcome: Progressing     Problem: INFECTION - ADULT  Goal: Absence or prevention of progression during hospitalization  Description: INTERVENTIONS:  - Assess and monitor for signs and symptoms of infection  - Monitor lab/diagnostic results  - Monitor all insertion sites, i.e. indwelling lines, tubes, and drains  - Monitor endotracheal if appropriate and nasal secretions for changes in amount and color  - [place] appropriate cooling/warming therapies per order  - Administer medications as ordered  - Instruct and encourage patient and family to use good hand hygiene technique  - Identify and instruct in appropriate isolation precautions for identified infection/condition  Outcome: Progressing  Goal: Absence of fever/infection during neutropenic period  Description: INTERVENTIONS:  - Monitor WBC    Outcome: Progressing     Problem: SAFETY ADULT  Goal: Patient will remain free of falls  Description: INTERVENTIONS:  - Educate patient/family on patient safety including physical limitations  - Instruct patient to call for assistance with activity   - Consult OT/PT to assist with strengthening/mobility   - Keep Call bell within reach  - Keep bed low and locked with side rails adjusted as appropriate  - Keep care items and personal belongings within reach  - Initiate and maintain comfort rounds  - Make Fall Risk Sign visible to staff  - Offer Toileting every 3 Hours,  in advance of need  - Initiate/Maintain alarm  - Obtain necessary fall risk management equipment:   - Apply yellow socks and bracelet for high fall risk patients  - Consider moving patient to room near nurses station  Outcome: Progressing  Goal: Maintain or return to baseline ADL function  Description: INTERVENTIONS:  -  Assess patient's ability to carry out ADLs; assess patient's baseline for ADL function and identify physical deficits which impact ability to perform ADLs (bathing, care of mouth/teeth, toileting, grooming, dressing, etc.)  - Assess/evaluate cause of self-care deficits   - Assess range of motion  - Assess patient's mobility; develop plan if impaired  - Assess patient's need for assistive devices and provide as appropriate  - Encourage maximum independence but intervene and supervise when necessary  - Involve family in performance of ADLs  - Assess for home care needs following discharge   - Consider OT consult to assist with ADL evaluation and planning for discharge  - Provide patient education as appropriate  Outcome: Progressing  Goal: Maintains/Returns to pre admission functional level  Description: INTERVENTIONS:  - Perform AM-PAC 6 Click Basic Mobility/ Daily Activity assessment daily.  - Set and communicate daily mobility goal to care team and patient/family/caregiver.   - Collaborate with rehabilitation services on mobility goals if consulted  - Perform Range of Motion 3 times a day.  - Reposition patient every 2 hours.  - Dangle patient 3 times a day  - Stand patient 3 times a day  - Ambulate patient 3 times a day  - Out of bed to chair 3 times a day   - Out of bed for meals 3 times a day  - Out of bed for toileting  - Record patient progress and toleration of activity level   Outcome: Progressing     Problem: Nutrition/Hydration-ADULT  Goal: Nutrient/Hydration intake appropriate for improving, restoring or maintaining nutritional needs  Description: Monitor and assess patient's  nutrition/hydration status for malnutrition. Collaborate with interdisciplinary team and initiate plan and interventions as ordered.  Monitor patient's weight and dietary intake as ordered or per policy. Utilize nutrition screening tool and intervene as necessary. Determine patient's food preferences and provide high-protein, high-caloric foods as appropriate.     INTERVENTIONS:  - Monitor oral intake, urinary output, labs, and treatment plans  - Assess nutrition and hydration status and recommend course of action  - Evaluate amount of meals eaten  - Assist patient with eating if necessary   - Allow adequate time for meals  - Recommend/ encourage appropriate diets, oral nutritional supplements, and vitamin/mineral supplements  - Order, calculate, and assess calorie counts as needed  - Recommend, monitor, and adjust tube feedings and TPN/PPN based on assessed needs  - Assess need for intravenous fluids  - Provide specific nutrition/hydration education as appropriate  - Include patient/family/caregiver in decisions related to nutrition  Outcome: Progressing     Problem: Prexisting or High Potential for Compromised Skin Integrity  Goal: Skin integrity is maintained or improved  Description: INTERVENTIONS:  - Identify patients at risk for skin breakdown  - Assess and monitor skin integrity  - Assess and monitor nutrition and hydration status  - Monitor labs   - Assess for incontinence   - Turn and reposition patient  - Assist with mobility/ambulation  - Relieve pressure over bony prominences  - Avoid friction and shearing  - Provide appropriate hygiene as needed including keeping skin clean and dry  - Evaluate need for skin moisturizer/barrier cream  - Collaborate with interdisciplinary team   - Patient/family teaching  - Consider wound care consult   Outcome: Progressing
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
## 2025-02-04 NOTE — ASSESSMENT & PLAN NOTE
Patient found with altered mental status.  Hardly awakening on pain stimuli and high voice yesterday  Today he awakes to voice, oriented x 3  Altered mental status likely in the setting of hyponatremia, UDS is positive  Resolving

## 2025-02-04 NOTE — ASSESSMENT & PLAN NOTE
Lab Results   Component Value Date    EGFR 72 02/04/2025    EGFR 39 02/03/2025    EGFR 85 12/01/2024    CREATININE 1.03 02/04/2025    CREATININE 1.68 (H) 02/03/2025    CREATININE 0.95 12/01/2024     Resolved with IV fluids, was likely prerenal

## 2025-02-04 NOTE — ASSESSMENT & PLAN NOTE
1 out of 2 blood cultures so far growing gram positive cocci in clusters  Will add vancomycin and ID consult  Repeat cultures  Will get an echo given drug use history

## 2025-02-04 NOTE — QUICK NOTE
EKG right bundle branch block appears stable.  .  QTc has lengthened.  With patient being bradycardic, QTc greater than 500 puts patient at risk for developing Tdap.  Recommend giving 2 g IV magnesium over 1 hour.  Optimize electrolytes.  Avoid QTc prolonging agents and medications that lower heart rate.  Serial EKGs and continue to monitor.  Goal QTc less than 500.    Provided there are no additional medical concerns, the patient may be cleared from a toxicological standpoint by the primary provider when QTc < 500, if the patient is asymptomatic, with normal mental status and vital signs, and otherwise normal exam. Please call medical  on call immediately to discuss any changes in clinical course or laboratory abnormalities.

## 2025-02-04 NOTE — UTILIZATION REVIEW
Initial Clinical Review      Attention Clinical Reviewer: This patient is currently a prisoner.        Observation 2/3/25 @ 1645 converted to inpatient admission 2/4/25 @ 1553 for continued care & tx for toxic metabolic encephalopathy.    Admission: Date/Time/Statement:   Admission Orders (From admission, onward)       Ordered        02/04/25 1553  INPATIENT ADMISSION  Once            02/03/25 1645  Place in Observation  Once                          Orders Placed This Encounter   Procedures    INPATIENT ADMISSION     Standing Status:   Standing     Number of Occurrences:   1     Level of Care:   Med Surg [16]     Estimated length of stay:   More than 2 Midnights     Certification:   I certify that inpatient services are medically necessary for this patient for a duration of greater than two midnights. See H&P and MD Progress Notes for additional information about the patient's course of treatment.     ED Arrival Information       Expected   -    Arrival   2/3/2025 14:53    Acuity   Emergent              Means of arrival   Ambulance    Escorted by   KENDALL ([place])    Service   Hospitalist    Admission type   Emergency              Arrival complaint   -             Chief Complaint   Patient presents with    Overdose - Accidental     Patient arrives from [facility]. Per CO's/EMS, patient was completing intake when he went unresponsive. Pt was given a dose of narcan, and regained responsiveness. Patient denies any drug use. Pt able to respond to questions.        Initial Presentation:   70 yom to ER from [facility] via [facility] for unresponsive episode, regained consciousness after Narcan. Admits to smoking marijuana today. Hx liver transplant, hepatitis C, hypertension, hyperlipidemia, diabetes, CAD, COPD, CHF, CKD stage III. Presents hypertensive, diffuse mild tremor arousing easily to voice, mild stimulation. Admission CXR: Possible left lower lobe pneumonia. CT chest: Groundglass density in the posterior dependent left lower lung  for more likely to represent subpleural atelectasis than left lower lobe pneumonia, accounting for the radiographic finding. Pulmonary emphysema.  CT head neg. Labs:  na 133, BUN 32, Cr 1.68, ast 61, alk phos 198CK 350, procalcitonin 0.68, UDS+cocaine, opiates, THC, acetaminophen <2.  Placed in observation status for toxic metabolic encephalopathy. Plan: IVF, toxicology consult, COWS qs  Per toxicology: AMS  Broad differential for both toxicologic and medical causes of encephalopathy. Could be a mixed picture ingestion if did have some response to naloxone. Initially was hypertensive - could be have uncovered mixed ingestion of both opioid and stimulant/sympathomimetic. Other things to consider is withdrawal from benzodiazepines or alcohol, excess serotonin.   Review of PDMP shows RX for oxycodone in 12/2024  When he is improved, would elicit more of a substance use history  Monitor for withdrawal  Primary team mentioned possible ingestion of cocaine, would avoid use of beta blockers for hypertension. Can use benzodiazepines, calcium channel blockers, hydralazine.  Noted to have abnormal movements when awake - possibly crack dancing?  Can check UDS to further eval for cocaine  Mainstay of care is supportive. Benzodiazepines for agitation or seizures. Aspiration precautions with HOB elevated/ Would keep on end tidal if possible while altered  Does have Prolonged QRS and QTC. EKG from 10 years ago does not have widened QTS or RBBB. Cocaine does have sodium channel blocking properties. For QRS >160 would give 1meq/kg boluses of bicarbonate. If QRS >120 with hypotension would administer the same amount of bicarbonate  Would check q4h EKGs to evaluate QRS and QTC  Continuous cardiac monitoring for any wide complex tachycardia. Initial treatment would be bicarbonate. If refractory then consider 1mg/kg lidocaine  Prolonged QT:  For QTc >500ms would give 2g IV magnesium  Optimize potassium, calcium, magnesium to top  normal  Continuous cardiac monitoring  Serial EKGs until QTc has normalized  Avoid QT prolonging agents    Observation to IP admission 2/4/25:  Altered mental status likely in the setting of hyponatremia, UDS is positive. Oriented x3. 1 out of 2 blood cultures so far growing gram positive cocci in clusters. ID consulted, repeat cultures, echo ordered due to drug hx.  Officers remain at bedside, pt handcuffed to bed.     Date: 2/5/25  Day 3: Has surpassed a 2nd midnight with active treatments and services.  A&OX4. Officers at bedside. + blood cultures. Echo showed no valvular vegetation. Repeat cultures drawn last night, pending. Continue to monitor behaviors, VS, follow labs, follow up cultures.   Per ID: +blood cultures.  2 of 2 sets admission blood cultures are growing GPCs in clusters, with staph epidermidis identified on BCID. The patient has no systemic signs of infection, no leukocytosis or fever. Denies IVDU.   Plan: start IV Cefazolin, follow up BC, TTE.      ED Treatment-Medication Administration from 02/03/2025 1453 to 02/03/2025 2020         Date/Time Order Dose Route Action     02/03/2025 1529 lactated ringers bolus 1,000 mL 1,000 mL Intravenous New Bag     02/03/2025 1528 labetalol (NORMODYNE) injection 20 mg 20 mg Intravenous Given     02/03/2025 1735 ceftriaxone (ROCEPHIN) 1 g/50 mL in dextrose IVPB 1,000 mg Intravenous New Bag     02/03/2025 1802 azithromycin (ZITHROMAX) 500 mg in sodium chloride 0.9% 250mL IVPB 500 mg 500 mg Intravenous New Bag     02/03/2025 1815 atenolol (TENORMIN) tablet 100 mg -- Oral Held Dose            Scheduled Medications:  Medications 01/27 01/28 01/29 01/30 01/31 02/01 02/02 02/03 02/04 02/05   atenolol (TENORMIN) tablet 100 mg  Dose: 100 mg  Freq: 2 times daily Route: PO  Start: 02/03/25 1815 End: 02/03/25 1842   Admin Instructions:      Order specific questions:              1802 1815     1842-D/C'd  1842          azithromycin (ZITHROMAX) 500 mg in sodium chloride  0.9% 250mL IVPB 500 mg  Dose: 500 mg  Freq: Once Route: IV  Last Dose: Stopped (02/03/25 1949)  Start: 02/03/25 1645 End: 02/03/25 1949   Order specific questions:              1802 1949          ceFAZolin (ANCEF) IVPB (premix in dextrose) 2,000 mg 50 mL  Dose: 2,000 mg  Freq: Every 8 hours Route: IV  Start: 02/05/25 1215   Admin Instructions:      Order specific questions:                1215 2015        ceftriaxone (ROCEPHIN) 1 g/50 mL in dextrose IVPB  Dose: 1,000 mg  Freq: Once Route: IV  Last Dose: Stopped (02/03/25 1805)  Start: 02/03/25 1645 End: 02/03/25 1805   Admin Instructions:      Order specific questions:              1735 1805          cloNIDine (CATAPRES) tablet 0.1 mg  Dose: 0.1 mg  Freq: Every 12 hours scheduled Route: PO  Start: 02/03/25 2100   Admin Instructions:      Order specific questions:              (2106) [C]      0915     2155      0905     2100        gabapentin (NEURONTIN) capsule 100 mg  Dose: 100 mg  Freq: 3 times daily Route: PO  Start: 02/03/25 2100   Admin Instructions:              (2106) [C]      0915     1621     2155      0905     1600     2100        gabapentin (NEURONTIN) capsule 300 mg  Dose: 300 mg  Freq: 3 times daily Route: PO  Start: 02/03/25 2100 End: 02/03/25 1825   Admin Instructions:              1825-D/C'd        heparin (porcine) subcutaneous injection 5,000 Units  Dose: 5,000 Units  Freq: Every 8 hours scheduled Route: SC  Start: 02/03/25 2200   Admin Instructions:              2210      0535     1429     2155      0625     1400     2200        labetalol (NORMODYNE) injection 20 mg  Dose: 20 mg  Freq: Once Route: IV  Start: 02/03/25 1600 End: 02/05/25 1124   Admin Instructions:              1639 [C]       1124-D/C'd      labetalol (NORMODYNE) injection 20 mg  Dose: 20 mg  Freq: Once Route: IV  Start: 02/03/25 1515 End: 02/03/25 1528   Admin Instructions:              1528          lactated ringers bolus 1,000 mL  Dose: 1,000 mL  Freq: Once Route:  IV  Last Dose: Stopped (02/03/25 1949)  Start: 02/03/25 1515 End: 02/03/25 1949 1529 1949          magnesium sulfate 2 g/50 mL IVPB (premix) 2 g  Dose: 2 g  Freq: Once Route: IV  Last Dose: 2 g (02/04/25 1429)  Start: 02/04/25 1400 End: 02/04/25 1529   Admin Instructions:               1429         naloxone (NARCAN) 0.4 mg/mL injection **ADS Override Pull**  Start: 02/03/25 1502 End: 02/04/25 0314   Admin Instructions:              1515      0314-D/C'd       OXcarbazepine (TRILEPTAL) tablet 300 mg  Dose: 300 mg  Freq: Every 12 hours scheduled Route: PO  Start: 02/03/25 2100   Admin Instructions:              (2107) [C]      0914     2154      0905 2100        pantoprazole (PROTONIX) EC tablet 40 mg  Dose: 40 mg  Freq: Daily Route: PO  Start: 02/04/25 0900   Admin Instructions:               0915 0905        pravastatin (PRAVACHOL) tablet 20 mg  Dose: 20 mg  Freq: Daily Route: PO  Start: 02/04/25 0900 0915      0905        tacrolimus (PROGRAF) capsule 1 mg  Dose: 1 mg  Freq: Daily Route: PO  Start: 02/04/25 0900   Admin Instructions:               0914      0905        tacrolimus (PROGRAF) capsule 1 mg  Dose: 1 mg  Freq: Every 12 hours scheduled Route: PO  Indications Comment: 2 mg in AM and 1 mg at night  Start: 02/03/25 2100   Admin Instructions:              (2107) [C]      (5716) [C]     2159      1151 [C]     2100        vancomycin (VANCOCIN) 1,750 mg in sodium chloride 0.9 % 500 mL IVPB  Dose: 25 mg/kg  Weight Dosing Info: 71.9 kg  Freq: Once Route: IV  Start: 02/04/25 1600 End: 02/04/25 1609   Admin Instructions:      Order specific questions:               1609-D/C'd  (1620) [C]         vancomycin (VANCOCIN) IVPB (premix in dextrose) 750 mg 150 mL  Dose: 750 mg  Freq: Every 12 hours Route: IV  Start: 02/05/25 0600 End: 02/04/25 1609   Admin Instructions:      Order specific questions:               1609-D/C'd       Legend:        [id number]/2701/2801/2901/3001/3102/0102/0202/0302/0402/05        Continuous Meds Sorted by Name  for Yousif Weiner as of 01/27/25 through 2/5/25  Legend:       Medications 01/27 01/28 01/29 01/30 01/31 02/01 02/02 02/03 02/04 02/05   sodium chloride 0.9 % infusion  Rate: 100 mL/hr Dose: 100 mL/hr  Freq: Continuous Route: IV  Indications of Use: IV Hydration  Last Dose: Stopped (02/04/25 1030)  Start: 02/03/25 1815 End: 02/04/25 1009 2038      0248     1009-D/C'd  1030 [C]                     PRN Meds Sorted by Name  for Yousif Weiner as of 01/27/25 through 2/5/25  Legend:       Medications 01/27 01/28 01/29 01/30 01/31 02/01 02/02 02/03 02/04 02/05   acetaminophen (TYLENOL) tablet 650 mg  Dose: 650 mg  Freq: Every 6 hours PRN Route: PO  PRN Reason: mild pain  Start: 02/03/25 1802             1011        naloxone (NARCAN) 0.4 mg/mL injection **ADS Override Pull**  Start: 02/03/25 1502 End: 02/04/25 0314   Admin Instructions:              1515      0314-D/C'd       ondansetron (ZOFRAN-ODT) dispersible tablet 4 mg  Dose: 4 mg  Freq: Every 6 hours PRN Route: PO  PRN Reasons: nausea,vomiting  Start: 02/03/25 1802 End: 02/03/25 1826   Admin Instructions:              1826-D/C'd        trimethobenzamide (TIGAN) IM injection 200 mg  Dose: 200 mg  Freq: Every 6 hours PRN Route: IM  PRN Reasons: nausea,vomiting  Start: 02/03/25 1826   Admin Instructions:                                 ED Triage Vitals   Temperature Pulse Respirations Blood Pressure SpO2 Pain Score   02/03/25 1952 02/03/25 1458 02/03/25 1458 02/03/25 1458 02/03/25 1458 02/04/25 0930   98.4 °F (36.9 °C) 76 14 (!) 198/90 98 % No Pain     Weight (last 2 days)       Date/Time Weight    02/05/25 1145 71.7 (158)    02/03/25 20:24:03 71.9 (158.51)    02/03/25 1809 80.6 (177.69)            Vital Signs (last 3 days)       Date/Time Temp Pulse Resp BP MAP (mmHg) SpO2 O2 Flow Rate (L/min) O2 Device Patient Position - Orthostatic VS Mino Coma  Scale Score Clinical Opiate Withdrawal Scale Total Score Pain    02/05/25 1145 -- 66 -- 169/91 -- -- -- -- -- -- -- --    02/05/25 1011 -- -- -- -- -- -- -- -- -- -- -- 5    02/05/25 08:01:26 98.3 °F (36.8 °C) 66 -- 169/91 117 98 % -- -- -- -- -- --    02/04/25 21:53:03 99 °F (37.2 °C) 63 16 142/74 97 96 % -- -- -- -- -- --    02/04/25 2100 -- -- -- -- -- -- -- -- -- 8 -- No Pain    02/04/25 14:56:32 98.5 °F (36.9 °C) 63 -- 139/69 92 96 % -- -- -- -- -- --    02/04/25 0930 -- -- -- -- -- -- -- -- -- -- -- No Pain    02/04/25 07:39:15 98.4 °F (36.9 °C) 58 -- 152/73 99 99 % -- -- -- -- -- --    02/03/25 2100 -- -- -- -- -- -- 2 L/min Nasal cannula -- 8 2 --    02/03/25 20:24:03 97.5 °F (36.4 °C) 63 16 155/81 106 95 % 2 L/min Nasal cannula -- -- -- --    02/03/25 1952 98.4 °F (36.9 °C) -- -- -- -- -- -- -- -- -- -- --    02/03/25 1930 -- 55 19 170/76 109 97 % 2 L/min Nasal cannula Lying -- -- --    02/03/25 1900 -- 54 21 136/70 96 97 % 2 L/min Nasal cannula Lying -- -- --    02/03/25 1830 -- 58 17 151/80 109 98 % -- -- Lying -- -- --    02/03/25 1700 -- 71 19 159/80 110 95 % -- -- Lying -- -- --    02/03/25 1645 -- 56 19 137/79 103 96 % -- -- Lying -- -- --    02/03/25 1641 -- -- -- -- -- -- -- -- -- 13 -- --    02/03/25 1640 -- -- 24 137/79 103 96 % 2 L/min Nasal cannula -- -- -- --    02/03/25 1533 -- -- -- 190/72 -- -- -- -- -- -- -- --    02/03/25 1530 -- 62 14 190/72 104 97 % -- -- -- -- -- --    02/03/25 1500 -- 80 22 203/83 114 97 % -- EtCO2 nasal cannula Lying -- -- --    02/03/25 1458 -- 76 14 198/90 -- 98 % -- EtCO2 nasal cannula Lying -- -- --              Pertinent Labs/Diagnostic Test Results:   Radiology:  CT head without contrast   Final Interpretation by Ethan Fonseca MD (02/03 1619)      No acute intracranial abnormality.                  Workstation performed: [id number]         CT chest without contrast   Final Interpretation by Ethan Fonseca MD (02/03 1625)      Groundglass density in the  posterior dependent left lower lung for more likely to represent subpleural atelectasis than left lower lobe pneumonia, accounting for the radiographic finding. If there is continued concern for developing posterior left lower    lobe pneumonia, recommendation is for continued radiographic follow-up including PA and left lateral radiographs.      Pulmonary emphysema.      Workstation performed: [id number]         XR chest 1 view portable   ED Interpretation by Pooja Stevens DO (02/03 1556)   Bilateral multifocal opacities      Final Interpretation by Sathish Woo MD (02/03 1605)      Possible left lower lobe pneumonia.            Workstation performed: [id number]           Cardiology:  Echo complete w/ contrast if indicated   Final Result by Mikey Ricci MD (02/05 1211)        Left Ventricle: Left ventricle is not well visualized. Left ventricular    cavity size is grossly normal. Wall thickness is mildly increased. The    left ventricular ejection fraction is 53%. Systolic function is low    normal. Wall motion is normal. Diastolic function is mildly abnormal,    consistent with grade I (abnormal) relaxation.     Left Atrium: The atrium is mildly dilated.     Aortic Valve: There is aortic valve sclerosis.     Mitral Valve: There is mild annular calcification. There is mild    regurgitation.     No valvular vegetation visualized within the study's technical    limitation. Clinical correlation is advised.         ECG 12 lead   Final Result by Betsey Gimenez MD (02/04 9318)   Sinus bradycardia with Fusion complexes   Right bundle branch block      Confirmed by Betsey Gimenez ([id number]) on 2/4/2025 4:06:55 PM      ECG 12 lead   Final Result by Betsey Gimenez MD (02/04 5816)   Normal sinus rhythm   Right bundle branch block   Left ventricular hypertrophy with repolarization abnormality      Confirmed by Betsey Gimenez ([id number]) on 2/4/2025 4:07:01 PM      ECG 12 lead   Final Result by Betsey  "MD Pernell (02/03 1533)   Normal sinus rhythm   Right bundle branch block   Abnormal ECG      Confirmed by Betsey Gimenez ([id number]) on 2/3/2025 3:33:51 PM        GI:  No orders to display       Results from last 7 days   Lab Units 02/03/25  1713   SARS-COV-2  Negative     Results from last 7 days   Lab Units 02/05/25  0554 02/04/25  0441 02/03/25  1520   WBC Thousand/uL 4.78 4.62 5.94   HEMOGLOBIN g/dL 11.9* 11.9* 13.3   HEMATOCRIT % 36.4* 36.0* 41.4   PLATELETS Thousands/uL 197 188 174   TOTAL NEUT ABS Thousands/µL 2.20 2.20 2.94         Results from last 7 days   Lab Units 02/05/25  0554 02/04/25  0441 02/03/25  1520   SODIUM mmol/L 136 137 133*   POTASSIUM mmol/L 4.0 3.5 3.7   CHLORIDE mmol/L 105 105 98   CO2 mmol/L 27 24 28   ANION GAP mmol/L 4 8 7   BUN mg/dL 23 22 32*   CREATININE mg/dL 0.99 1.03 1.68*   EGFR ml/min/1.73sq m 75 72 39   CALCIUM mg/dL 8.4 7.8* 8.7   MAGNESIUM mg/dL  --   --  1.9     Results from last 7 days   Lab Units 02/04/25  0441 02/03/25  1520   AST U/L 29 61*   ALT U/L 24 37   ALK PHOS U/L 143* 198*   TOTAL PROTEIN g/dL 5.9* 7.5   ALBUMIN g/dL 3.1* 4.0   TOTAL BILIRUBIN mg/dL 0.67 0.49   AMMONIA umol/L  --  23     Results from last 7 days   Lab Units 02/03/25  1509   POC GLUCOSE mg/dl 115     Results from last 7 days   Lab Units 02/05/25  0554 02/04/25  0441 02/03/25  1520   GLUCOSE RANDOM mg/dL 135 85 98             No results found for: \"BETA-HYDROXYBUTYRATE\"               Results from last 7 days   Lab Units 02/04/25  0441 02/03/25  1520   CK TOTAL U/L 157 350*     Results from last 7 days   Lab Units 02/03/25 2030 02/03/25  1950 02/03/25  1520   HS TNI 0HR ng/L  --   --  29   HS TNI 2HR ng/L  --  24  --    HSTNI D2 ng/L  --  -5  --    HS TNI 4HR ng/L 23  --   --    HSTNI D4 ng/L -6  --   --              Results from last 7 days   Lab Units 02/03/25  1950   TSH 3RD GENERATON uIU/mL 0.951     Results from last 7 days   Lab Units 02/03/25  1520   PROCALCITONIN ng/ml 0.68*           "                                               Results from last 7 days   Lab Units 02/03/25  1713   INFLUENZA A PCR  Negative   INFLUENZA B PCR  Negative   RSV PCR  Negative         Results from last 7 days   Lab Units 02/03/25  1807   AMPH/METH  Negative   BARBITURATE UR  Negative   BENZODIAZEPINE UR  Negative   COCAINE UR  Positive*   METHADONE URINE  Negative   OPIATE UR  Positive*   PCP UR  Negative   THC UR  Positive*     Results from last 7 days   Lab Units 02/03/25  1950 02/03/25  1520   ETHANOL LVL mg/dL <10 <10   ACETAMINOPHEN LVL ug/mL <2*  --    SALICYLATE LVL mg/dL <5  --                  Results from last 7 days   Lab Units 02/04/25  1656 02/03/25  1713   BLOOD CULTURE  Received in Microbiology Lab. Culture in Progress.  Received in Microbiology Lab. Culture in Progress. Staphylococcus epidermidis*   GRAM STAIN RESULT   --  Gram positive cocci in clusters*  Gram positive cocci in clusters*                   Past Medical History:   Diagnosis Date    Abnormal electrocardiogram     Abnormal findings on radiological examination of gastrointestinal tract     Abnormal liver enzymes     Acute hepatitis C     Adrenal disorder (HCC)     Aneurysm of unspecified site (HCC)     Benign neoplasm of skin     Bronchopneumonia     Chronic hepatitis C (HCC)     Chronic obstructive asthma (HCC)     Cirrhosis (HCC)     Colon, diverticulosis     COPD (chronic obstructive pulmonary disease) (HCC)     Depression     Diabetes mellitus (HCC)     Drug dependence, continuous abuse (HCC)     Hyperlipidemia     Hypertension     Laennec's cirrhosis (alcoholic) (HCC)     Malabsorption syndrome     Mitral valve disorder     Murmur     Nonspecific abnormal finding on cardiac evaluation     Peripheral vascular disease (HCC)     Pleural effusion     Pneumonia     Rectal hemorrhage     Renal failure     Respiratory abnormality     Restrictive lung disease     lung disease other not elsewhere class     Testicular dysfunction      testicular hypfunction other     Tricuspid valve disorders, non-rheumatic     Type 2 diabetes mellitus (HCC)     uncomplicated controlled      Ventral hernia      Present on Admission:  **None**      Admitting Diagnosis: Altered mental status [R41.82]  Prolonged Q-T interval on ECG [R94.31]  Hypoxia [R09.02]  SHANTAL (acute kidney injury) (HCC) [N17.9]  Overdose, accidental or unintentional, initial encounter [T50.901A]  Age/Sex: 70 y.o. male    Network Utilization Review Department  ATTENTION: Please call with any questions or concerns to [phone] and carefully listen to the prompts so that you are directed to the right person. All voicemails are confidential.   For Discharge needs, contact Care Management DC Support Team at [phone] opt. [phone]  Send all requests for admission clinical reviews, approved or denied determinations and any other requests to dedicated fax number below belonging to the campus where the patient is receiving treatment. List of dedicated fax numbers for the Facilities:  FACILITY NAME UR FAX NUMBER   ADMISSION DENIALS (Administrative/Medical Necessity) [phone]   DISCHARGE SUPPORT TEAM (NETWORK) [phone]   PARENT CHILD HEALTH (Maternity/NICU/Pediatrics) [phone]   [facility] [phone]   [facility] [phone]   [facility] [phone]   [facility] [phone]   [facility] [phone]   [facility] [phone]   [facility] [phone]   [facility] [phone]   [facility] [phone]   [facility] [phone]   [facility] [phone]   [facility] [phone]

## 2025-02-05 ENCOUNTER — APPOINTMENT (INPATIENT)
Dept: NON INVASIVE DIAGNOSTICS | Facility: HOSPITAL | Age: 70
DRG: 917 | End: 2025-02-05
Payer: COMMERCIAL

## 2025-02-05 LAB
ANION GAP SERPL CALCULATED.3IONS-SCNC: 4 MMOL/L (ref 4–13)
AORTIC ROOT: 3.5 CM
ASCENDING AORTA: 2.6 CM
BASOPHILS # BLD AUTO: 0.05 THOUSANDS/ΜL (ref 0–0.1)
BASOPHILS NFR BLD AUTO: 1 % (ref 0–1)
BSA FOR ECHO PROCEDURE: 1.95 M2
BUN SERPL-MCNC: 23 MG/DL (ref 5–25)
CALCIUM SERPL-MCNC: 8.4 MG/DL (ref 8.4–10.2)
CHLORIDE SERPL-SCNC: 105 MMOL/L (ref 96–108)
CO2 SERPL-SCNC: 27 MMOL/L (ref 21–32)
CREAT SERPL-MCNC: 0.99 MG/DL (ref 0.6–1.3)
E WAVE DECELERATION TIME: 298 MS
E/A RATIO: 1.23
EOSINOPHIL # BLD AUTO: 0.12 THOUSAND/ΜL (ref 0–0.61)
EOSINOPHIL NFR BLD AUTO: 3 % (ref 0–6)
ERYTHROCYTE [DISTWIDTH] IN BLOOD BY AUTOMATED COUNT: 13.6 % (ref 11.6–15.1)
FRACTIONAL SHORTENING: 23 (ref 28–44)
GFR SERPL CREATININE-BSD FRML MDRD: 75 ML/MIN/1.73SQ M
GLUCOSE SERPL-MCNC: 135 MG/DL (ref 65–140)
HCT VFR BLD AUTO: 36.4 % (ref 36.5–49.3)
HGB BLD-MCNC: 11.9 G/DL (ref 12–17)
IMM GRANULOCYTES # BLD AUTO: 0.01 THOUSAND/UL (ref 0–0.2)
IMM GRANULOCYTES NFR BLD AUTO: 0 % (ref 0–2)
INTERVENTRICULAR SEPTUM IN DIASTOLE (PARASTERNAL SHORT AXIS VIEW): 1.2 CM
INTERVENTRICULAR SEPTUM: 1.2 CM (ref 0.6–1.1)
LA/AORTA RATIO 2D: 1.2
LAAS-AP2: 30.4 CM2
LAAS-AP4: 16.7 CM2
LEFT ATRIUM SIZE: 4.2 CM
LEFT INTERNAL DIMENSION IN SYSTOLE: 4 CM (ref 2.1–4)
LEFT VENTRICULAR INTERNAL DIMENSION IN DIASTOLE: 5.2 CM (ref 3.5–6)
LEFT VENTRICULAR POSTERIOR WALL IN END DIASTOLE: 1.2 CM
LEFT VENTRICULAR STROKE VOLUME: 62 ML
LV EF US.2D.A4C+ESTIMATED: 53 %
LVSV (TEICH): 62 ML
LYMPHOCYTES # BLD AUTO: 2.07 THOUSANDS/ΜL (ref 0.6–4.47)
LYMPHOCYTES NFR BLD AUTO: 43 % (ref 14–44)
MCH RBC QN AUTO: 30.7 PG (ref 26.8–34.3)
MCHC RBC AUTO-ENTMCNC: 32.7 G/DL (ref 31.4–37.4)
MCV RBC AUTO: 94 FL (ref 82–98)
MONOCYTES # BLD AUTO: 0.33 THOUSAND/ΜL (ref 0.17–1.22)
MONOCYTES NFR BLD AUTO: 7 % (ref 4–12)
MV E'TISSUE VEL-SEP: 9 CM/S
MV PEAK A VEL: 0.48 M/S
MV PEAK E VEL: 59 CM/S
MV STENOSIS PRESSURE HALF TIME: 87 MS
MV VALVE AREA P 1/2 METHOD: 2.53
NEUTROPHILS # BLD AUTO: 2.2 THOUSANDS/ΜL (ref 1.85–7.62)
NEUTS SEG NFR BLD AUTO: 46 % (ref 43–75)
NRBC BLD AUTO-RTO: 0 /100 WBCS
PLATELET # BLD AUTO: 197 THOUSANDS/UL (ref 149–390)
PMV BLD AUTO: 9.9 FL (ref 8.9–12.7)
POTASSIUM SERPL-SCNC: 4 MMOL/L (ref 3.5–5.3)
RA PRESSURE ESTIMATED: 3 MMHG
RBC # BLD AUTO: 3.88 MILLION/UL (ref 3.88–5.62)
RIGHT VENTRICLE ID DIMENSION: 4.7 CM
RV PSP: 28 MMHG
SL CV LV EF: 53
SL CV PED ECHO LEFT VENTRICLE DIASTOLIC VOLUME (MOD BIPLANE) 2D: 132 ML
SL CV PED ECHO LEFT VENTRICLE SYSTOLIC VOLUME (MOD BIPLANE) 2D: 70 ML
SODIUM SERPL-SCNC: 136 MMOL/L (ref 135–147)
TR MAX PG: 25 MMHG
TR PEAK VELOCITY: 2.5 M/S
TRICUSPID ANNULAR PLANE SYSTOLIC EXCURSION: 3 CM
TRICUSPID VALVE PEAK REGURGITATION VELOCITY: 2.49 M/S
WBC # BLD AUTO: 4.78 THOUSAND/UL (ref 4.31–10.16)

## 2025-02-05 PROCEDURE — 93306 TTE W/DOPPLER COMPLETE: CPT | Performed by: INTERNAL MEDICINE

## 2025-02-05 PROCEDURE — 80048 BASIC METABOLIC PNL TOTAL CA: CPT | Performed by: STUDENT IN AN ORGANIZED HEALTH CARE EDUCATION/TRAINING PROGRAM

## 2025-02-05 PROCEDURE — 99222 1ST HOSP IP/OBS MODERATE 55: CPT | Performed by: STUDENT IN AN ORGANIZED HEALTH CARE EDUCATION/TRAINING PROGRAM

## 2025-02-05 PROCEDURE — G0545 PR INHERENT VISIT TO INPT: HCPCS | Performed by: STUDENT IN AN ORGANIZED HEALTH CARE EDUCATION/TRAINING PROGRAM

## 2025-02-05 PROCEDURE — 93306 TTE W/DOPPLER COMPLETE: CPT

## 2025-02-05 PROCEDURE — 85025 COMPLETE CBC W/AUTO DIFF WBC: CPT | Performed by: STUDENT IN AN ORGANIZED HEALTH CARE EDUCATION/TRAINING PROGRAM

## 2025-02-05 PROCEDURE — 99232 SBSQ HOSP IP/OBS MODERATE 35: CPT | Performed by: STUDENT IN AN ORGANIZED HEALTH CARE EDUCATION/TRAINING PROGRAM

## 2025-02-05 RX ORDER — WATER 10 ML/10ML
INJECTION INTRAMUSCULAR; INTRAVENOUS; SUBCUTANEOUS
Status: DISCONTINUED
Start: 2025-02-05 | End: 2025-02-05 | Stop reason: WASHOUT

## 2025-02-05 RX ORDER — TACROLIMUS 1 MG/1
3 CAPSULE ORAL DAILY
Qty: 90 CAPSULE | Refills: 0 | Status: SHIPPED | OUTPATIENT
Start: 2025-02-05 | End: 2025-02-06

## 2025-02-05 RX ORDER — CEFAZOLIN SODIUM 2 G/50ML
2000 SOLUTION INTRAVENOUS EVERY 8 HOURS
Status: DISCONTINUED | OUTPATIENT
Start: 2025-02-05 | End: 2025-02-07

## 2025-02-05 RX ADMIN — GABAPENTIN 100 MG: 100 CAPSULE ORAL at 17:00

## 2025-02-05 RX ADMIN — TACROLIMUS 1 MG: 0.5 CAPSULE ORAL at 11:51

## 2025-02-05 RX ADMIN — CLONIDINE HYDROCHLORIDE 0.1 MG: 0.1 TABLET ORAL at 09:05

## 2025-02-05 RX ADMIN — GABAPENTIN 100 MG: 100 CAPSULE ORAL at 20:50

## 2025-02-05 RX ADMIN — CLONIDINE HYDROCHLORIDE 0.1 MG: 0.1 TABLET ORAL at 20:51

## 2025-02-05 RX ADMIN — ACETAMINOPHEN 650 MG: 325 TABLET, FILM COATED ORAL at 10:11

## 2025-02-05 RX ADMIN — PANTOPRAZOLE SODIUM 40 MG: 40 TABLET, DELAYED RELEASE ORAL at 09:05

## 2025-02-05 RX ADMIN — OXCARBAZEPINE 300 MG: 150 TABLET, FILM COATED ORAL at 09:05

## 2025-02-05 RX ADMIN — CEFAZOLIN SODIUM 2000 MG: 2 SOLUTION INTRAVENOUS at 12:10

## 2025-02-05 RX ADMIN — PRAVASTATIN SODIUM 20 MG: 20 TABLET ORAL at 09:05

## 2025-02-05 RX ADMIN — HEPARIN SODIUM 5000 UNITS: 5000 INJECTION INTRAVENOUS; SUBCUTANEOUS at 20:48

## 2025-02-05 RX ADMIN — TACROLIMUS 1 MG: 0.5 CAPSULE ORAL at 20:49

## 2025-02-05 RX ADMIN — OXCARBAZEPINE 300 MG: 150 TABLET, FILM COATED ORAL at 20:50

## 2025-02-05 RX ADMIN — CEFAZOLIN SODIUM 2000 MG: 2 SOLUTION INTRAVENOUS at 20:56

## 2025-02-05 RX ADMIN — HEPARIN SODIUM 5000 UNITS: 5000 INJECTION INTRAVENOUS; SUBCUTANEOUS at 14:25

## 2025-02-05 RX ADMIN — GABAPENTIN 100 MG: 100 CAPSULE ORAL at 09:05

## 2025-02-05 RX ADMIN — HEPARIN SODIUM 5000 UNITS: 5000 INJECTION INTRAVENOUS; SUBCUTANEOUS at 06:25

## 2025-02-05 RX ADMIN — TACROLIMUS 1 MG: 0.5 CAPSULE ORAL at 09:05

## 2025-02-05 NOTE — ASSESSMENT & PLAN NOTE
Lab Results   Component Value Date    EGFR 75 02/05/2025    EGFR 72 02/04/2025    EGFR 39 02/03/2025    CREATININE 0.99 02/05/2025    CREATININE 1.03 02/04/2025    CREATININE 1.68 (H) 02/03/2025     Resolved with IV fluids, was likely prerenal

## 2025-02-05 NOTE — ASSESSMENT & PLAN NOTE
2 of 2 sets admission blood cultures are growing GPCs in clusters, with staph epidermidis identified on BCID. The patient has no systemic signs of infection, no leukocytosis or fever. Denies IVDU. Low concern for true infection but given growth in both sets will start antibiotics pending further culture results. Overall suspect these are contaminants.   -start Cefazolin 2g every 8 hours   -follow up blood cultures   -follow up TTE

## 2025-02-05 NOTE — PROGRESS NOTES
Progress Note - Hospitalist   Name: Yousif Weiner 70 y.o. male I MRN: [id number]  Unit/Bed#: -01 I Date of Admission: 2/3/2025   Date of Service: 2/5/2025 I Hospital Day: 1    Assessment & Plan  Toxic metabolic encephalopathy  Was likely in the setting of hyponatremia in combination with probable drug use.  Is now resolved and he is at baseline mentation now.  Positive blood culture  2 out of 2blood cultures so now growing gram positive cocci in clusters  Discussed with ID, will be started on Ancef and repeat cultures are pending  Echo showed no valvular vegetation  Repeat cultures drawn last night, pending  Liver transplanted (HCC)  History of liver transplant-is supposed to be on tacrolimus however he says he cannot afford and has not been taking it for at least a week  Will continue here  Discussed with case management will need reliable way for patient to get this once discharged-prescription sent  Acute kidney injury superimposed on chronic kidney disease  (HCC)  Lab Results   Component Value Date    EGFR 75 02/05/2025    EGFR 72 02/04/2025    EGFR 39 02/03/2025    CREATININE 0.99 02/05/2025    CREATININE 1.03 02/04/2025    CREATININE 1.68 (H) 02/03/2025     Resolved with IV fluids, was likely prerenal  Elevated CK  Resolved with fluids  Abnormal CT scan, chest  Less likely pneumonia and more likely atelectasis  Monitor resp status    Hyponatremia  Resolved with fluids, sodium is 136 today    VTE Pharmacologic Prophylaxis: VTE Score: 3 Moderate Risk (Score 3-4) - Pharmacological DVT Prophylaxis Ordered: heparin.    Mobility:   Basic Mobility Inpatient Raw Score: 23  JH-HLM Goal: 7: Walk 25 feet or more  JH-HLM Achieved: 7: Walk 25 feet or more  JH-HLM Goal achieved. Continue to encourage appropriate mobility.    Patient Centered Rounds: I performed bedside rounds with nursing staff today.   Discussions with Specialists or Other Care Team Provider: ID, case management      Current Length of Stay: 1  day(s)  Current Patient Status: Inpatient   Certification Statement: The patient will continue to require additional inpatient hospital stay due to IV antibiotics, repeat blood cultures  Discharge Plan: Anticipate discharge in 24-48 hrs [facility]    Code Status: Level 1 - Full Code    Subjective   Patient states he feels fine this morning.  I asked him to stand up so I could see his back better, no open lesions noted.  He is oriented x 3.  Adamantly denies any IV drug use recently.    Objective :  Temp:  [98.3 °F (36.8 °C)-99 °F (37.2 °C)] 98.3 °F (36.8 °C)  HR:  [63-66] 66  BP: (139-169)/(69-91) 169/91  Resp:  [16] 16  SpO2:  [96 %-98 %] 98 %    Body mass index is 20.85 kg/m².     Input and Output Summary (last 24 hours):     Intake/Output Summary (Last 24 hours) at 2/5/2025 1314  Last data filed at 2/5/2025 1101  Gross per 24 hour   Intake 510 ml   Output 2400 ml   Net -1890 ml       Physical Exam  Constitutional:       Appearance: Normal appearance.      Comments: Handcuffed to bed   HENT:      Head: Normocephalic and atraumatic.   Cardiovascular:      Rate and Rhythm: Normal rate and regular rhythm.      Heart sounds: Normal heart sounds. No murmur heard.  Pulmonary:      Effort: Pulmonary effort is normal.   Abdominal:      Palpations: Abdomen is soft.   Skin:     General: Skin is warm and dry.      Comments: Back with extensive scarring and skin changes in the mid to lower back, no open wounds noted   Neurological:      General: No focal deficit present.      Mental Status: He is alert and oriented to person, place, and time. Mental status is at baseline.         Lines/Drains:  Lines/Drains/Airways       Active Status       Name Placement date Placement time Site Days    External Urinary Catheter 02/03/25  1800  -- 1                            Lab Results: I have reviewed the following results:   Results from last 7 days   Lab Units 02/05/25  0554   WBC Thousand/uL 4.78   HEMOGLOBIN g/dL 11.9*    HEMATOCRIT % 36.4*   PLATELETS Thousands/uL 197   SEGS PCT % 46   LYMPHO PCT % 43   MONO PCT % 7   EOS PCT % 3     Results from last 7 days   Lab Units 02/05/25  0554 02/04/25  0441   SODIUM mmol/L 136 137   POTASSIUM mmol/L 4.0 3.5   CHLORIDE mmol/L 105 105   CO2 mmol/L 27 24   BUN mg/dL 23 22   CREATININE mg/dL 0.99 1.03   ANION GAP mmol/L 4 8   CALCIUM mg/dL 8.4 7.8*   ALBUMIN g/dL  --  3.1*   TOTAL BILIRUBIN mg/dL  --  0.67   ALK PHOS U/L  --  143*   ALT U/L  --  24   AST U/L  --  29   GLUCOSE RANDOM mg/dL 135 85         Results from last 7 days   Lab Units 02/03/25  1509   POC GLUCOSE mg/dl 115         Results from last 7 days   Lab Units 02/03/25  1520   PROCALCITONIN ng/ml 0.68*       Recent Cultures (last 7 days):   Results from last 7 days   Lab Units 02/04/25  1656 02/03/25  1713   BLOOD CULTURE  Received in Microbiology Lab. Culture in Progress.  Received in Microbiology Lab. Culture in Progress. Staphylococcus epidermidis*   GRAM STAIN RESULT   --  Gram positive cocci in clusters*  Gram positive cocci in clusters*       Last 24 Hours Medication List:     Current Facility-Administered Medications:     acetaminophen (TYLENOL) tablet 650 mg, Q6H PRN    ceFAZolin (ANCEF) IVPB (premix in dextrose) 2,000 mg 50 mL, Q8H    cloNIDine (CATAPRES) tablet 0.1 mg, Q12H OCTAVIO    gabapentin (NEURONTIN) capsule 100 mg, TID    heparin (porcine) subcutaneous injection 5,000 Units, Q8H OCTAVIO    OXcarbazepine (TRILEPTAL) tablet 300 mg, Q12H OCTAVIO    pantoprazole (PROTONIX) EC tablet 40 mg, Daily    pravastatin (PRAVACHOL) tablet 20 mg, Daily    tacrolimus (PROGRAF) capsule 1 mg, Q12H OCTAVIO    tacrolimus (PROGRAF) capsule 1 mg, Daily    trimethobenzamide (TIGAN) IM injection 200 mg, Q6H PRN    Administrative Statements   Today, Patient Was Seen By: Azeem Singh MD      **Please Note: This note may have been constructed using a voice recognition system.**

## 2025-02-05 NOTE — PLAN OF CARE
Problem: PAIN - ADULT  Goal: Verbalizes/displays adequate comfort level or baseline comfort level  Description: Interventions:  - Encourage patient to monitor pain and request assistance  - Assess pain using appropriate pain scale  - Administer analgesics based on type and severity of pain and evaluate response  - Implement non-pharmacological measures as appropriate and evaluate response  - Consider cultural and social influences on pain and pain management  - Notify physician/advanced practitioner if interventions unsuccessful or patient reports new pain  Outcome: Progressing     Problem: INFECTION - ADULT  Goal: Absence or prevention of progression during hospitalization  Description: INTERVENTIONS:  - Assess and monitor for signs and symptoms of infection  - Monitor lab/diagnostic results  - Monitor all insertion sites, i.e. indwelling lines, tubes, and drains  - Monitor endotracheal if appropriate and nasal secretions for changes in amount and color  - [place] appropriate cooling/warming therapies per order  - Administer medications as ordered  - Instruct and encourage patient and family to use good hand hygiene technique  - Identify and instruct in appropriate isolation precautions for identified infection/condition  Outcome: Progressing  Goal: Absence of fever/infection during neutropenic period  Description: INTERVENTIONS:  - Monitor WBC    Outcome: Progressing     Problem: SAFETY ADULT  Goal: Patient will remain free of falls  Description: INTERVENTIONS:  - Educate patient/family on patient safety including physical limitations  - Instruct patient to call for assistance with activity   - Consult OT/PT to assist with strengthening/mobility   - Keep Call bell within reach  - Keep bed low and locked with side rails adjusted as appropriate  - Keep care items and personal belongings within reach  - Initiate and maintain comfort rounds  - Make Fall Risk Sign visible to staff  - Offer Toileting every 2 Hours,  in advance of need  - Initiate/Maintain bed alarm  - Obtain necessary fall risk management equipment: walker  - Apply yellow socks and bracelet for high fall risk patients  - Consider moving patient to room near nurses station  Outcome: Progressing  Goal: Maintain or return to baseline ADL function  Description: INTERVENTIONS:  -  Assess patient's ability to carry out ADLs; assess patient's baseline for ADL function and identify physical deficits which impact ability to perform ADLs (bathing, care of mouth/teeth, toileting, grooming, dressing, etc.)  - Assess/evaluate cause of self-care deficits   - Assess range of motion  - Assess patient's mobility; develop plan if impaired  - Assess patient's need for assistive devices and provide as appropriate  - Encourage maximum independence but intervene and supervise when necessary  - Involve family in performance of ADLs  - Assess for home care needs following discharge   - Consider OT consult to assist with ADL evaluation and planning for discharge  - Provide patient education as appropriate  Outcome: Progressing  Goal: Maintains/Returns to pre admission functional level  Description: INTERVENTIONS:  - Perform AM-PAC 6 Click Basic Mobility/ Daily Activity assessment daily.  - Set and communicate daily mobility goal to care team and patient/family/caregiver.   - Collaborate with rehabilitation services on mobility goals if consulted  - Perform Range of Motion 2 times a day.  - Reposition patient every 2 hours.  - Dangle patient 2 times a day  - Stand patient 2 times a day  - Ambulate patient 2 times a day  - Out of bed to chair 2 times a day   - Out of bed for meals 3 times a day  - Out of bed for toileting  - Record patient progress and toleration of activity level   Outcome: Progressing

## 2025-02-05 NOTE — PLAN OF CARE
Problem: PAIN - ADULT  Goal: Verbalizes/displays adequate comfort level or baseline comfort level  Description: Interventions:  - Encourage patient to monitor pain and request assistance  - Assess pain using appropriate pain scale  - Administer analgesics based on type and severity of pain and evaluate response  - Implement non-pharmacological measures as appropriate and evaluate response  - Consider cultural and social influences on pain and pain management  - Notify physician/advanced practitioner if interventions unsuccessful or patient reports new pain  Outcome: Progressing     Problem: INFECTION - ADULT  Goal: Absence or prevention of progression during hospitalization  Description: INTERVENTIONS:  - Assess and monitor for signs and symptoms of infection  - Monitor lab/diagnostic results  - Monitor all insertion sites, i.e. indwelling lines, tubes, and drains  - Monitor endotracheal if appropriate and nasal secretions for changes in amount and color  - [place] appropriate cooling/warming therapies per order  - Administer medications as ordered  - Instruct and encourage patient and family to use good hand hygiene technique  - Identify and instruct in appropriate isolation precautions for identified infection/condition  Outcome: Progressing

## 2025-02-05 NOTE — CONSULTS
Consultation - Infectious Disease   Name: Yousif Weiner 70 y.o. male I MRN: [id number]  Unit/Bed#: -01 I Date of Admission: 2/3/2025   Date of Service: 2/5/2025 I Hospital Day: 1   Inpatient consult to Infectious Diseases  Consult performed by: Marcelle Clemente MD  Consult ordered by: Azeem Singh MD        Physician Requesting Evaluation: Azeem Singh MD   Reason for Evaluation / Principal Problem: positive blood cultures    Assessment & Plan  Toxic metabolic encephalopathy  Likely due to drug ingestion that was witnessed by police officers. UDS positive for cocaine, THC, opiates. Some improvement with Narcan. CTH without acute abnormalities. Mental status improved.   -monitor mental status  Positive blood culture  2 of 2 sets admission blood cultures are growing GPCs in clusters, with staph epidermidis identified on BCID. The patient has no systemic signs of infection, no leukocytosis or fever. Denies IVDU. Low concern for true infection but given growth in both sets will start antibiotics pending further culture results. Overall suspect these are contaminants.   -start Cefazolin 2g every 8 hours   -follow up blood cultures   -follow up TTE  Liver transplanted (HCC)  Patient supposed to be on tacrolimus but he has not been able to afford this for the past week.    -continue tacrolimus inpatient   -GI follow up   -CM following for medication assistance   Acute kidney injury superimposed on chronic kidney disease  (HCC)  Lab Results   Component Value Date    EGFR 75 02/05/2025    EGFR 72 02/04/2025    EGFR 39 02/03/2025    CREATININE 0.99 02/05/2025    CREATININE 1.03 02/04/2025    CREATININE 1.68 (H) 02/03/2025   Creatinine elevated to 1.68 on admission from baseline around 1. Likely prerenal. SHANTAL resolved with IVF.   -repeat Cr tomorrow   -dose adjust antibiotics as needed for CrCl  Elevated CK  Likely due to dehydration, drug use. Improved with IVF  Abnormal CT scan, chest  CT chest shows a left  basilar density, likely atelectasis. He has no signs or symptoms of pneumonia. Recommend incentive spirometer, ambulation. No indication for antibiotics for this issue.  Above management plan to start Ancef, follow up blood cultures discussed with Dr. Singh. Discussed with the patient at bedside. ID will follow.    Antibiotics:  Off antibiotics     History of Present Illness   Yousif Weiner is a 70 y.o. man with a history of liver transplant due to cirrhosis currently on tacrolimus, COPD, and CKD who was brought to the hospital from half-way with encephalopathy. Prior to being arrested, the patient ingested an unknown substance (possibly cocaine). On EMS arrival he was given Narcan with some improvement in his mental status. On arrival to the ED the patient was afebrile and hemodynamically stable. Labs were notable for SHANTAL, elevated CK, UDS positive for THC, cocaine and opiates. WBC count was normal. CT chest showed a groundglass density in the left lower lobe likely due to subpleural atelectasis rather than pneumonia. He received a dose of Ceftriaxone and azithromycin but monitored off further antibiotics. Blood cultures were collected on admission and are growing GPC in clusters in both sets, with staph epidermidis identified on BCID. ID is consulted for further management. The patient is feeling fatigued today but he states this is is baseline. He had no acute change in symptoms prior to the hospitalization. He reports chronic chills, no fever. The patient denies IVDU. He occasionally had scratches on his arms from his dog and cats but none of the scratches have become infected.    A complete review of systems is negative other than that noted in the HPI.    I have reviewed the patient's PMH, PSH, Social History, Family History, Meds, and Allergies    Objective :  Temp:  [98.3 °F (36.8 °C)-99 °F (37.2 °C)] 98.3 °F (36.8 °C)  HR:  [63-66] 66  BP: (139-169)/(69-91) 169/91  Resp:  [16] 16  SpO2:  [96 %-98 %] 98  %    General:  chronically ill appearing but no acute distress  Psychiatric:  Awake and alert  Cardiac: RRR, no murmurs  Pulmonary:  Normal respiratory excursion without accessory muscle use, decreased breath sounds bilaterally   Abdomen:  Soft, nontender  Extremities:  No edema  Skin:  few scratches on the arms, no cellulitis present  Neuro: moving all extremities spontaneously      Lab Results: I have reviewed the following results:  Results from last 7 days   Lab Units 02/05/25  0554 02/04/25  0441 02/03/25  1520   WBC Thousand/uL 4.78 4.62 5.94   HEMOGLOBIN g/dL 11.9* 11.9* 13.3   PLATELETS Thousands/uL 197 188 174     Results from last 7 days   Lab Units 02/05/25  0554 02/04/25  0441 02/03/25  1520   SODIUM mmol/L 136 137 133*   POTASSIUM mmol/L 4.0 3.5 3.7   CHLORIDE mmol/L 105 105 98   CO2 mmol/L 27 24 28   BUN mg/dL 23 22 32*   CREATININE mg/dL 0.99 1.03 1.68*   EGFR ml/min/1.73sq m 75 72 39   CALCIUM mg/dL 8.4 7.8* 8.7   AST U/L  --  29 61*   ALT U/L  --  24 37   ALK PHOS U/L  --  143* 198*   ALBUMIN g/dL  --  3.1* 4.0     Results from last 7 days   Lab Units 02/04/25  1656 02/03/25  1713   BLOOD CULTURE  Received in Microbiology Lab. Culture in Progress.  Received in Microbiology Lab. Culture in Progress. Staphylococcus epidermidis*   GRAM STAIN RESULT   --  Gram positive cocci in clusters*  Gram positive cocci in clusters*     Results from last 7 days   Lab Units 02/03/25  1520   PROCALCITONIN ng/ml 0.68*     Imaging:  CT chest personally reviewed, shows left basilar opacity likely atelectasis

## 2025-02-05 NOTE — ASSESSMENT & PLAN NOTE
History of liver transplant-is supposed to be on tacrolimus however he says he cannot afford and has not been taking it for at least a week  Will continue here  Discussed with case management will need reliable way for patient to get this once discharged-prescription sent

## 2025-02-05 NOTE — ASSESSMENT & PLAN NOTE
Lab Results   Component Value Date    EGFR 75 02/05/2025    EGFR 72 02/04/2025    EGFR 39 02/03/2025    CREATININE 0.99 02/05/2025    CREATININE 1.03 02/04/2025    CREATININE 1.68 (H) 02/03/2025   Creatinine elevated to 1.68 on admission from baseline around 1. Likely prerenal. SHANTAL resolved with IVF.   -repeat Cr tomorrow   -dose adjust antibiotics as needed for CrCl

## 2025-02-05 NOTE — ASSESSMENT & PLAN NOTE
2 out of 2blood cultures so now growing gram positive cocci in clusters  Discussed with ID, will be started on Ancef and repeat cultures are pending  Echo showed no valvular vegetation  Repeat cultures drawn last night, pending

## 2025-02-05 NOTE — CASE MANAGEMENT
Case Management Discharge Planning Note    Patient name Yousif Weiner  Location /-01 MRN [id number]  : 1955 Date 2025       Current Admission Date: 2/3/2025  Current Admission Diagnosis:Toxic metabolic encephalopathy   Patient Active Problem List    Diagnosis Date Noted Date Diagnosed    Positive blood culture 2025     Toxic metabolic encephalopathy 2025     Acute kidney injury superimposed on chronic kidney disease  (HCC) 2025     Elevated CK 2025     Abnormal CT scan, chest 2025     Hyponatremia 2025     Chronic diastolic heart failure (HCC) 2022     Stage 3a chronic kidney disease (HCC) 2022     Depression, recurrent (HCC) 2021     Other specified disorders of arteries and arterioles (HCC) 2021     Type 2 diabetes mellitus, without long-term current use of insulin (HCC) 2021     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HCC) 2020     Hepatitis C virus carrier state (HCC) 2020     Hepatic fibrosis 2019     Blood clotting disorder (HCC) 2019     Tobacco abuse 2018     Idiopathic peripheral neuropathy 2018     Primary osteoarthritis of left knee 03/15/2018     Liver transplanted (HCC) 03/15/2018     Immunosuppression (HCC) 2016     Arteriosclerosis of coronary artery 2014     Anxiety 2013     Chronic obstructive pulmonary disease (HCC) 2013     Esophagitis, reflux 2013     Hyperlipidemia 2013     Hypertension 2013     Impaired fasting glucose 2013       LOS (days): 1  Geometric Mean LOS (GMLOS) (days): 3.8  Days to GMLOS:2.8     OBJECTIVE:  Risk of Unplanned Readmission Score: 13.09         Current admission status: Inpatient   Preferred Pharmacy:   [id number] - [place], [place] - [address] [place] [id number]  Phone: [phone] Fax: [phone]    Primary Care Provider: Sebastian Spicer MD    Primary  Insurance: [employer] RE[employer]  Secondary Insurance: skilled nursing    DISCHARGE DETAILS:     CM called [facility]. CM spoke with hermila monae who informed cm to fax scripts to [phone]. CM then spoke with supervisor torin from facility who informed cm to fax clinicals as well\. CM request slim provider to send all scripts so cm can print and fax everything together.

## 2025-02-05 NOTE — ASSESSMENT & PLAN NOTE
Patient supposed to be on tacrolimus but he has not been able to afford this for the past week.    -continue tacrolimus inpatient   -GI follow up   -CM following for medication assistance

## 2025-02-05 NOTE — ASSESSMENT & PLAN NOTE
CT chest shows a left basilar density, likely atelectasis. He has no signs or symptoms of pneumonia. Recommend incentive spirometer, ambulation. No indication for antibiotics for this issue.

## 2025-02-05 NOTE — ASSESSMENT & PLAN NOTE
Was likely in the setting of hyponatremia in combination with probable drug use.  Is now resolved and he is at baseline mentation now.

## 2025-02-06 LAB
ANION GAP SERPL CALCULATED.3IONS-SCNC: 2 MMOL/L (ref 4–13)
BASOPHILS # BLD AUTO: 0.06 THOUSANDS/ΜL (ref 0–0.1)
BASOPHILS NFR BLD AUTO: 1 % (ref 0–1)
BUN SERPL-MCNC: 20 MG/DL (ref 5–25)
CALCIUM SERPL-MCNC: 8.7 MG/DL (ref 8.4–10.2)
CHLORIDE SERPL-SCNC: 102 MMOL/L (ref 96–108)
CO2 SERPL-SCNC: 31 MMOL/L (ref 21–32)
CREAT SERPL-MCNC: 1.03 MG/DL (ref 0.6–1.3)
EOSINOPHIL # BLD AUTO: 0.2 THOUSAND/ΜL (ref 0–0.61)
EOSINOPHIL NFR BLD AUTO: 4 % (ref 0–6)
ERYTHROCYTE [DISTWIDTH] IN BLOOD BY AUTOMATED COUNT: 13.2 % (ref 11.6–15.1)
GFR SERPL CREATININE-BSD FRML MDRD: 72 ML/MIN/1.73SQ M
GLUCOSE SERPL-MCNC: 117 MG/DL (ref 65–140)
HCT VFR BLD AUTO: 39.4 % (ref 36.5–49.3)
HGB BLD-MCNC: 12.1 G/DL (ref 12–17)
IMM GRANULOCYTES # BLD AUTO: 0.02 THOUSAND/UL (ref 0–0.2)
IMM GRANULOCYTES NFR BLD AUTO: 0 % (ref 0–2)
LYMPHOCYTES # BLD AUTO: 2.35 THOUSANDS/ΜL (ref 0.6–4.47)
LYMPHOCYTES NFR BLD AUTO: 51 % (ref 14–44)
MCH RBC QN AUTO: 29.9 PG (ref 26.8–34.3)
MCHC RBC AUTO-ENTMCNC: 30.7 G/DL (ref 31.4–37.4)
MCV RBC AUTO: 97 FL (ref 82–98)
MONOCYTES # BLD AUTO: 0.3 THOUSAND/ΜL (ref 0.17–1.22)
MONOCYTES NFR BLD AUTO: 6 % (ref 4–12)
NEUTROPHILS # BLD AUTO: 1.81 THOUSANDS/ΜL (ref 1.85–7.62)
NEUTS SEG NFR BLD AUTO: 38 % (ref 43–75)
NRBC BLD AUTO-RTO: 0 /100 WBCS
PLATELET # BLD AUTO: 205 THOUSANDS/UL (ref 149–390)
PMV BLD AUTO: 10.1 FL (ref 8.9–12.7)
POTASSIUM SERPL-SCNC: 4.7 MMOL/L (ref 3.5–5.3)
RBC # BLD AUTO: 4.05 MILLION/UL (ref 3.88–5.62)
SODIUM SERPL-SCNC: 135 MMOL/L (ref 135–147)
WBC # BLD AUTO: 4.74 THOUSAND/UL (ref 4.31–10.16)

## 2025-02-06 PROCEDURE — 99232 SBSQ HOSP IP/OBS MODERATE 35: CPT | Performed by: STUDENT IN AN ORGANIZED HEALTH CARE EDUCATION/TRAINING PROGRAM

## 2025-02-06 PROCEDURE — G0545 PR INHERENT VISIT TO INPT: HCPCS | Performed by: STUDENT IN AN ORGANIZED HEALTH CARE EDUCATION/TRAINING PROGRAM

## 2025-02-06 PROCEDURE — 80048 BASIC METABOLIC PNL TOTAL CA: CPT | Performed by: STUDENT IN AN ORGANIZED HEALTH CARE EDUCATION/TRAINING PROGRAM

## 2025-02-06 PROCEDURE — 85025 COMPLETE CBC W/AUTO DIFF WBC: CPT | Performed by: STUDENT IN AN ORGANIZED HEALTH CARE EDUCATION/TRAINING PROGRAM

## 2025-02-06 RX ORDER — PANTOPRAZOLE SODIUM 40 MG/1
40 TABLET, DELAYED RELEASE ORAL DAILY
Qty: 90 TABLET | Refills: 0 | Status: SHIPPED | OUTPATIENT
Start: 2025-02-06

## 2025-02-06 RX ORDER — PRAVASTATIN SODIUM 20 MG
20 TABLET ORAL DAILY
Qty: 90 TABLET | Refills: 0 | Status: SHIPPED | OUTPATIENT
Start: 2025-02-06

## 2025-02-06 RX ORDER — TACROLIMUS 1 MG/1
3 CAPSULE ORAL DAILY
Qty: 90 CAPSULE | Refills: 0 | Status: SHIPPED | OUTPATIENT
Start: 2025-02-06

## 2025-02-06 RX ORDER — OXCARBAZEPINE 300 MG/1
300 TABLET, FILM COATED ORAL EVERY 12 HOURS SCHEDULED
Qty: 60 TABLET | Refills: 0 | Status: SHIPPED | OUTPATIENT
Start: 2025-02-06

## 2025-02-06 RX ADMIN — HEPARIN SODIUM 5000 UNITS: 5000 INJECTION INTRAVENOUS; SUBCUTANEOUS at 05:51

## 2025-02-06 RX ADMIN — PRAVASTATIN SODIUM 20 MG: 20 TABLET ORAL at 09:05

## 2025-02-06 RX ADMIN — GABAPENTIN 100 MG: 100 CAPSULE ORAL at 09:04

## 2025-02-06 RX ADMIN — PANTOPRAZOLE SODIUM 40 MG: 40 TABLET, DELAYED RELEASE ORAL at 09:05

## 2025-02-06 RX ADMIN — CEFAZOLIN SODIUM 2000 MG: 2 SOLUTION INTRAVENOUS at 05:51

## 2025-02-06 RX ADMIN — TACROLIMUS 1 MG: 0.5 CAPSULE ORAL at 22:10

## 2025-02-06 RX ADMIN — CEFAZOLIN SODIUM 2000 MG: 2 SOLUTION INTRAVENOUS at 12:52

## 2025-02-06 RX ADMIN — GABAPENTIN 100 MG: 100 CAPSULE ORAL at 22:11

## 2025-02-06 RX ADMIN — OXCARBAZEPINE 300 MG: 150 TABLET, FILM COATED ORAL at 22:10

## 2025-02-06 RX ADMIN — CLONIDINE HYDROCHLORIDE 0.1 MG: 0.1 TABLET ORAL at 22:10

## 2025-02-06 RX ADMIN — CLONIDINE HYDROCHLORIDE 0.1 MG: 0.1 TABLET ORAL at 09:05

## 2025-02-06 RX ADMIN — OXCARBAZEPINE 300 MG: 150 TABLET, FILM COATED ORAL at 09:05

## 2025-02-06 RX ADMIN — CEFAZOLIN SODIUM 2000 MG: 2 SOLUTION INTRAVENOUS at 22:13

## 2025-02-06 RX ADMIN — HEPARIN SODIUM 5000 UNITS: 5000 INJECTION INTRAVENOUS; SUBCUTANEOUS at 15:40

## 2025-02-06 RX ADMIN — GABAPENTIN 100 MG: 100 CAPSULE ORAL at 16:51

## 2025-02-06 RX ADMIN — HEPARIN SODIUM 5000 UNITS: 5000 INJECTION INTRAVENOUS; SUBCUTANEOUS at 22:10

## 2025-02-06 RX ADMIN — TACROLIMUS 1 MG: 0.5 CAPSULE ORAL at 09:05

## 2025-02-06 NOTE — ASSESSMENT & PLAN NOTE
2 out of 2blood cultures growing gram positive cocci in clusters-Staph epidermidis  Discussed with ID will be continued on Ancef until speciation and repeat cultures are negative  Echo showed no valvular vegetation  Repeat cultures drawn last night, negative for 24 hours

## 2025-02-06 NOTE — ASSESSMENT & PLAN NOTE
Likely due to drug ingestion that was witnessed by police officers. UDS positive for cocaine, THC, opiates. Some improvement with Narcan. CTH without acute abnormalities. Mental status normalized   -monitor mental status

## 2025-02-06 NOTE — PROGRESS NOTES
Progress Note - Hospitalist   Name: Yousif Weiner 70 y.o. male I MRN: [id number]  Unit/Bed#: -01 I Date of Admission: 2/3/2025   Date of Service: 2/6/2025 I Hospital Day: 2    Assessment & Plan  Toxic metabolic encephalopathy  resolved  Positive blood culture  2 out of 2blood cultures growing gram positive cocci in clusters-Staph epidermidis  Discussed with ID will be continued on Ancef until speciation and repeat cultures are negative  Echo showed no valvular vegetation  Repeat cultures drawn last night, negative for 24 hours  Liver transplanted (HCC)  History of liver transplant-is supposed to be on tacrolimus however he says he cannot afford and has not been taking it for at least a week  Will continue here  Discussed with case management will need reliable way for patient to get this once discharged-prescription sent  Acute kidney injury superimposed on chronic kidney disease  (HCC)  Lab Results   Component Value Date    EGFR 72 02/06/2025    EGFR 75 02/05/2025    EGFR 72 02/04/2025    CREATININE 1.03 02/06/2025    CREATININE 0.99 02/05/2025    CREATININE 1.03 02/04/2025     Resolved with IV fluids, was likely prerenal  Elevated CK  Resolved with fluids  Abnormal CT scan, chest  Less likely pneumonia and more likely atelectasis  Monitor resp status    Hyponatremia  Resolved with fluids, sodium is 136 today    VTE Pharmacologic Prophylaxis: VTE Score: 3 Moderate Risk (Score 3-4) - Pharmacological DVT Prophylaxis Ordered: heparin.    Mobility:   Basic Mobility Inpatient Raw Score: 24  JH-HLM Goal: 8: Walk 250 feet or more  JH-HLM Achieved: 6: Walk 10 steps or more  JH-HLM Goal NOT achieved. Continue with multidisciplinary rounding and encourage appropriate mobility to improve upon JH-HLM goals.    Patient Centered Rounds: I performed bedside rounds with nursing staff today.   Discussions with Specialists or Other Care Team Provider: WARREN MAKI    Current Length of Stay: 2 day(s)  Current Patient Status:  Inpatient   Certification Statement: The patient will continue to require additional inpatient hospital stay due to IV antibiotics, repeat blood cultures  Discharge Plan: Anticipate discharge in 24-48 hrs to previous location    Code Status: Level 1 - Full Code    Subjective   No complaints, says he used to use a cane but lost that-wondering if there is a way he can get 1 as he feels a little bit unsteady    Objective :  Temp:  [97.9 °F (36.6 °C)-98.4 °F (36.9 °C)] 98.4 °F (36.9 °C)  HR:  [54-69] 66  BP: (151-166)/(73-91) 151/73  Resp:  [18-19] 18  SpO2:  [94 %-99 %] 98 %    Body mass index is 20.85 kg/m².     Input and Output Summary (last 24 hours):     Intake/Output Summary (Last 24 hours) at 2/6/2025 1533  Last data filed at 2/6/2025 1215  Gross per 24 hour   Intake 240 ml   Output 1700 ml   Net -1460 ml       Physical Exam  Constitutional:       Appearance: Normal appearance.      Comments: Handcuffed to bed   HENT:      Head: Normocephalic and atraumatic.   Cardiovascular:      Rate and Rhythm: Normal rate and regular rhythm.   Pulmonary:      Effort: Pulmonary effort is normal.      Breath sounds: Normal breath sounds.   Neurological:      General: No focal deficit present.      Mental Status: He is alert and oriented to person, place, and time.       Lines/Drains:  Lines/Drains/Airways       Active Status       Name Placement date Placement time Site Days    External Urinary Catheter 02/03/25  1800  -- 2                      Lab Results: I have reviewed the following results:   Results from last 7 days   Lab Units 02/06/25  0555   WBC Thousand/uL 4.74   HEMOGLOBIN g/dL 12.1   HEMATOCRIT % 39.4   PLATELETS Thousands/uL 205   SEGS PCT % 38*   LYMPHO PCT % 51*   MONO PCT % 6   EOS PCT % 4     Results from last 7 days   Lab Units 02/06/25  0555 02/05/25  0554 02/04/25  0441   SODIUM mmol/L 135   < > 137   POTASSIUM mmol/L 4.7   < > 3.5   CHLORIDE mmol/L 102   < > 105   CO2 mmol/L 31   < > 24   BUN mg/dL 20   < >  22   CREATININE mg/dL 1.03   < > 1.03   ANION GAP mmol/L 2*   < > 8   CALCIUM mg/dL 8.7   < > 7.8*   ALBUMIN g/dL  --   --  3.1*   TOTAL BILIRUBIN mg/dL  --   --  0.67   ALK PHOS U/L  --   --  143*   ALT U/L  --   --  24   AST U/L  --   --  29   GLUCOSE RANDOM mg/dL 117   < > 85    < > = values in this interval not displayed.         Results from last 7 days   Lab Units 02/03/25  1509   POC GLUCOSE mg/dl 115         Results from last 7 days   Lab Units 02/03/25  1520   PROCALCITONIN ng/ml 0.68*       Recent Cultures (last 7 days):   Results from last 7 days   Lab Units 02/04/25  1656 02/03/25  1713   BLOOD CULTURE  No Growth at 24 hrs.  No Growth at 24 hrs. Staphylococcus epidermidis*  Staphylococcus epidermidis*   GRAM STAIN RESULT   --  Gram positive cocci in clusters*  Gram positive cocci in clusters*       Last 24 Hours Medication List:     Current Facility-Administered Medications:     acetaminophen (TYLENOL) tablet 650 mg, Q6H PRN    ceFAZolin (ANCEF) IVPB (premix in dextrose) 2,000 mg 50 mL, Q8H, Last Rate: 2,000 mg (02/06/25 1252)    cloNIDine (CATAPRES) tablet 0.1 mg, Q12H OCTAVIO    gabapentin (NEURONTIN) capsule 100 mg, TID    heparin (porcine) subcutaneous injection 5,000 Units, Q8H OCTAVIO    OXcarbazepine (TRILEPTAL) tablet 300 mg, Q12H OCTAVIO    pantoprazole (PROTONIX) EC tablet 40 mg, Daily    pravastatin (PRAVACHOL) tablet 20 mg, Daily    tacrolimus (PROGRAF) capsule 1 mg, Q12H OCTAVIO    tacrolimus (PROGRAF) capsule 1 mg, Daily    trimethobenzamide (TIGAN) IM injection 200 mg, Q6H PRN    Administrative Statements   Today, Patient Was Seen By: Azeem Singh MD      **Please Note: This note may have been constructed using a voice recognition system.**

## 2025-02-06 NOTE — CASE MANAGEMENT
Case Management Discharge Planning Note    Patient name Yousif Weiner  Location /-01 MRN [id number]  : 1955 Date 2025       Current Admission Date: 2/3/2025  Current Admission Diagnosis:Toxic metabolic encephalopathy   Patient Active Problem List    Diagnosis Date Noted Date Diagnosed    Positive blood culture 2025     Toxic metabolic encephalopathy 2025     Acute kidney injury superimposed on chronic kidney disease  (HCC) 2025     Elevated CK 2025     Abnormal CT scan, chest 2025     Hyponatremia 2025     Chronic diastolic heart failure (HCC) 2022     Stage 3a chronic kidney disease (HCC) 2022     Depression, recurrent (HCC) 2021     Other specified disorders of arteries and arterioles (HCC) 2021     Type 2 diabetes mellitus, without long-term current use of insulin (HCC) 2021     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HCC) 2020     Hepatitis C virus carrier state (HCC) 2020     Hepatic fibrosis 2019     Blood clotting disorder (HCC) 2019     Tobacco abuse 2018     Idiopathic peripheral neuropathy 2018     Primary osteoarthritis of left knee 03/15/2018     Liver transplanted (HCC) 03/15/2018     Immunosuppression (HCC) 2016     Arteriosclerosis of coronary artery 2014     Anxiety 2013     Chronic obstructive pulmonary disease (HCC) 2013     Esophagitis, reflux 2013     Hyperlipidemia 2013     Hypertension 2013     Impaired fasting glucose 2013       LOS (days): 2  Geometric Mean LOS (GMLOS) (days): 3.8  Days to GMLOS:2     OBJECTIVE:  Risk of Unplanned Readmission Score: 12.19         Current admission status: Inpatient   Preferred Pharmacy:   [id number] - [place], [place] - [address] [place] [id number]  Phone: [phone] Fax: [phone]    Primary Care Provider: Sebastian Spicer MD    Primary  Insurance: [facility] REP  Secondary Insurance: senior living    DISCHARGE DETAILS:     CM faxed clinicals to medical staff at [facility]. [phone] Fax.     Cm updated  torin from [facility]. [phone].

## 2025-02-06 NOTE — ASSESSMENT & PLAN NOTE
Lab Results   Component Value Date    EGFR 72 02/06/2025    EGFR 75 02/05/2025    EGFR 72 02/04/2025    CREATININE 1.03 02/06/2025    CREATININE 0.99 02/05/2025    CREATININE 1.03 02/04/2025     Resolved with IV fluids, was likely prerenal

## 2025-02-06 NOTE — PLAN OF CARE
Problem: PAIN - ADULT  Goal: Verbalizes/displays adequate comfort level or baseline comfort level  Description: Interventions:  - Encourage patient to monitor pain and request assistance  - Assess pain using appropriate pain scale  - Administer analgesics based on type and severity of pain and evaluate response  - Implement non-pharmacological measures as appropriate and evaluate response  - Consider cultural and social influences on pain and pain management  - Notify physician/advanced practitioner if interventions unsuccessful or patient reports new pain  Outcome: Progressing     Problem: INFECTION - ADULT  Goal: Absence or prevention of progression during hospitalization  Description: INTERVENTIONS:  - Assess and monitor for signs and symptoms of infection  - Monitor lab/diagnostic results  - Monitor all insertion sites, i.e. indwelling lines, tubes, and drains  - Monitor endotracheal if appropriate and nasal secretions for changes in amount and color  - [place] appropriate cooling/warming therapies per order  - Administer medications as ordered  - Instruct and encourage patient and family to use good hand hygiene technique  - Identify and instruct in appropriate isolation precautions for identified infection/condition  Outcome: Progressing  Goal: Absence of fever/infection during neutropenic period  Description: INTERVENTIONS:  - Monitor WBC    Outcome: Progressing     Problem: SAFETY ADULT  Goal: Patient will remain free of falls  Description: INTERVENTIONS:  - Educate patient/family on patient safety including physical limitations  - Instruct patient to call for assistance with activity   - Consult OT/PT to assist with strengthening/mobility   - Keep Call bell within reach  - Keep bed low and locked with side rails adjusted as appropriate  - Keep care items and personal belongings within reach  - Initiate and maintain comfort rounds  - Make Fall Risk Sign visible to staff  - Apply yellow socks and bracelet  for high fall risk patients  - Consider moving patient to room near nurses station  Outcome: Progressing  Goal: Maintain or return to baseline ADL function  Description: INTERVENTIONS:  -  Assess patient's ability to carry out ADLs; assess patient's baseline for ADL function and identify physical deficits which impact ability to perform ADLs (bathing, care of mouth/teeth, toileting, grooming, dressing, etc.)  - Assess/evaluate cause of self-care deficits   - Assess range of motion  - Assess patient's mobility; develop plan if impaired  - Assess patient's need for assistive devices and provide as appropriate  - Encourage maximum independence but intervene and supervise when necessary  - Involve family in performance of ADLs  - Assess for home care needs following discharge   - Consider OT consult to assist with ADL evaluation and planning for discharge  - Provide patient education as appropriate  Outcome: Progressing  Goal: Maintains/Returns to pre admission functional level  Description: INTERVENTIONS:  - Perform AM-PAC 6 Click Basic Mobility/ Daily Activity assessment daily.  - Set and communicate daily mobility goal to care team and patient/family/caregiver.   - Collaborate with rehabilitation services on mobility goals if consulted  - Out of bed for toileting  - Record patient progress and toleration of activity level   Outcome: Progressing     Problem: DISCHARGE PLANNING  Goal: Discharge to home or other facility with appropriate resources  Description: INTERVENTIONS:  - Identify barriers to discharge w/patient and caregiver  - Arrange for needed discharge resources and transportation as appropriate  - Identify discharge learning needs (meds, wound care, etc.)  - Arrange for interpretive services to assist at discharge as needed  - Refer to Case Management Department for coordinating discharge planning if the patient needs post-hospital services based on physician/advanced practitioner order or complex needs  related to functional status, cognitive ability, or social support system  Outcome: Progressing     Problem: Knowledge Deficit  Goal: Patient/family/caregiver demonstrates understanding of disease process, treatment plan, medications, and discharge instructions  Description: Complete learning assessment and assess knowledge base.  Interventions:  - Provide teaching at level of understanding  - Provide teaching via preferred learning methods  Outcome: Progressing     Problem: Nutrition/Hydration-ADULT  Goal: Nutrient/Hydration intake appropriate for improving, restoring or maintaining nutritional needs  Description: Monitor and assess patient's nutrition/hydration status for malnutrition. Collaborate with interdisciplinary team and initiate plan and interventions as ordered.  Monitor patient's weight and dietary intake as ordered or per policy. Utilize nutrition screening tool and intervene as necessary. Determine patient's food preferences and provide high-protein, high-caloric foods as appropriate.     INTERVENTIONS:  - Monitor oral intake, urinary output, labs, and treatment plans  - Assess nutrition and hydration status and recommend course of action  - Evaluate amount of meals eaten  - Assist patient with eating if necessary   - Allow adequate time for meals  - Recommend/ encourage appropriate diets, oral nutritional supplements, and vitamin/mineral supplements  - Order, calculate, and assess calorie counts as needed  - Recommend, monitor, and adjust tube feedings and TPN/PPN based on assessed needs  - Assess need for intravenous fluids  - Provide specific nutrition/hydration education as appropriate  - Include patient/family/caregiver in decisions related to nutrition  Outcome: Progressing     Problem: Prexisting or High Potential for Compromised Skin Integrity  Goal: Skin integrity is maintained or improved  Description: INTERVENTIONS:  - Identify patients at risk for skin breakdown  - Assess and monitor skin  integrity  - Assess and monitor nutrition and hydration status  - Monitor labs   - Assess for incontinence   - Turn and reposition patient  - Assist with mobility/ambulation  - Relieve pressure over bony prominences  - Avoid friction and shearing  - Provide appropriate hygiene as needed including keeping skin clean and dry  - Evaluate need for skin moisturizer/barrier cream  - Collaborate with interdisciplinary team   - Patient/family teaching  - Consider wound care consult   Outcome: Progressing

## 2025-02-06 NOTE — PLAN OF CARE
Problem: PAIN - ADULT  Goal: Verbalizes/displays adequate comfort level or baseline comfort level  Description: Interventions:  - Encourage patient to monitor pain and request assistance  - Assess pain using appropriate pain scale  - Administer analgesics based on type and severity of pain and evaluate response  - Implement non-pharmacological measures as appropriate and evaluate response  - Consider cultural and social influences on pain and pain management  - Notify physician/advanced practitioner if interventions unsuccessful or patient reports new pain  Outcome: Progressing     Problem: INFECTION - ADULT  Goal: Absence or prevention of progression during hospitalization  Description: INTERVENTIONS:  - Assess and monitor for signs and symptoms of infection  - Monitor lab/diagnostic results  - Monitor all insertion sites, i.e. indwelling lines, tubes, and drains  - Monitor endotracheal if appropriate and nasal secretions for changes in amount and color  - [place] appropriate cooling/warming therapies per order  - Administer medications as ordered  - Instruct and encourage patient and family to use good hand hygiene technique  - Identify and instruct in appropriate isolation precautions for identified infection/condition  Outcome: Progressing

## 2025-02-06 NOTE — ASSESSMENT & PLAN NOTE
2 of 2 sets admission blood cultures are growing GPCs in clusters, with staph epidermidis identified in one set. The patient has no systemic signs of infection, no leukocytosis or fever. Denies IVDU. Low concern for true infection but given growth in both sets will continue antibiotics pending further culture results. Overall suspect these are contaminants. TTE no vegetations   -continue Cefazolin 2g every 8 hours   -follow up blood cultures   -if 2 different isolates coagulase negative staph grow, discontinue antibiotics    -if same isolate identified in both sets and repeat blood cultures remain negative, will complete a 5 day total course of antibiotics

## 2025-02-06 NOTE — PROGRESS NOTES
Progress Note - Infectious Disease   Name: Yousif Weiner 70 y.o. male I MRN: [id number]  Unit/Bed#: -01 I Date of Admission: 2/3/2025   Date of Service: 2/6/2025 I Hospital Day: 2    Assessment & Plan  Toxic metabolic encephalopathy  Likely due to drug ingestion that was witnessed by police officers. UDS positive for cocaine, THC, opiates. Some improvement with Narcan. CTH without acute abnormalities. Mental status normalized   -monitor mental status  Positive blood culture  2 of 2 sets admission blood cultures are growing GPCs in clusters, with staph epidermidis identified in one set. The patient has no systemic signs of infection, no leukocytosis or fever. Denies IVDU. Low concern for true infection but given growth in both sets will continue antibiotics pending further culture results. Overall suspect these are contaminants. TTE no vegetations   -continue Cefazolin 2g every 8 hours   -follow up blood cultures   -if 2 different isolates coagulase negative staph grow, discontinue antibiotics    -if same isolate identified in both sets and repeat blood cultures remain negative, will complete a 5 day total course of antibiotics   Liver transplanted (HCC)  Patient supposed to be on tacrolimus but he has not been able to afford this for the past week.    -continue tacrolimus inpatient   -GI follow up   -CM following for medication assistance   Acute kidney injury superimposed on chronic kidney disease  (HCC)  Lab Results   Component Value Date    EGFR 72 02/06/2025    EGFR 75 02/05/2025    EGFR 72 02/04/2025    CREATININE 1.03 02/06/2025    CREATININE 0.99 02/05/2025    CREATININE 1.03 02/04/2025   Creatinine elevated to 1.68 on admission from baseline around 1. Likely prerenal. SHANTAL resolved with IVF.   -repeat Cr tomorrow   -dose adjust antibiotics as needed for CrCl  Elevated CK  Likely due to dehydration, drug use. Improved with IVF  Abnormal CT scan, chest  CT chest shows a left basilar density, likely  atelectasis. He has no signs or symptoms of pneumonia. Recommend incentive spirometer, ambulation. No indication for antibiotics for this issue.    Above management plan to continue IV Cefazolin discussed with Dr. Singh. Discussed with the patient at bedside. ID will follow.    Antibiotics:  Cefazolin day 2    Subjective   The patient states that he is feeling the same at yesterday, no acute concerns or complaints. Reports chronic fatigue. No fevers.    Objective :  Temp:  [97.9 °F (36.6 °C)-98.3 °F (36.8 °C)] 98.1 °F (36.7 °C)  HR:  [54-69] 54  BP: (158-166)/(85-91) 160/86  Resp:  [18-19] 18  SpO2:  [94 %-99 %] 94 %    General:  chronically ill appearing but no acute distress  Psychiatric:  Awake and alert  Cardiac: RRR, no murmurs  Pulmonary:  Normal respiratory excursion without accessory muscle use, decreased breath sounds bilaterally   Abdomen:  Soft, nontender  Extremities:  No edema  Skin:  few scratches on the arms, no cellulitis present  Neuro: moving all extremities spontaneously      Lab Results: I have reviewed the following results:  Results from last 7 days   Lab Units 02/06/25  0555 02/05/25  0554 02/04/25  0441   WBC Thousand/uL 4.74 4.78 4.62   HEMOGLOBIN g/dL 12.1 11.9* 11.9*   PLATELETS Thousands/uL 205 197 188     Results from last 7 days   Lab Units 02/06/25  0555 02/05/25  0554 02/04/25  0441 02/03/25  1520   SODIUM mmol/L 135 136 137 133*   POTASSIUM mmol/L 4.7 4.0 3.5 3.7   CHLORIDE mmol/L 102 105 105 98   CO2 mmol/L 31 27 24 28   BUN mg/dL 20 23 22 32*   CREATININE mg/dL 1.03 0.99 1.03 1.68*   EGFR ml/min/1.73sq m 72 75 72 39   CALCIUM mg/dL 8.7 8.4 7.8* 8.7   AST U/L  --   --  29 61*   ALT U/L  --   --  24 37   ALK PHOS U/L  --   --  143* 198*   ALBUMIN g/dL  --   --  3.1* 4.0     Results from last 7 days   Lab Units 02/04/25  1656 02/03/25  1713   BLOOD CULTURE  No Growth at 24 hrs.  No Growth at 24 hrs. Staphylococcus epidermidis*   GRAM STAIN RESULT   --  Gram positive cocci in  clusters*  Gram positive cocci in clusters*     Results from last 7 days   Lab Units 02/03/25  1520   PROCALCITONIN ng/ml 0.68*

## 2025-02-06 NOTE — ASSESSMENT & PLAN NOTE
Lab Results   Component Value Date    EGFR 72 02/06/2025    EGFR 75 02/05/2025    EGFR 72 02/04/2025    CREATININE 1.03 02/06/2025    CREATININE 0.99 02/05/2025    CREATININE 1.03 02/04/2025   Creatinine elevated to 1.68 on admission from baseline around 1. Likely prerenal. SHANTAL resolved with IVF.   -repeat Cr tomorrow   -dose adjust antibiotics as needed for CrCl

## 2025-02-07 VITALS
WEIGHT: 158 LBS | TEMPERATURE: 98 F | RESPIRATION RATE: 16 BRPM | DIASTOLIC BLOOD PRESSURE: 70 MMHG | SYSTOLIC BLOOD PRESSURE: 131 MMHG | BODY MASS INDEX: 20.94 KG/M2 | HEART RATE: 57 BPM | OXYGEN SATURATION: 95 % | HEIGHT: 73 IN

## 2025-02-07 PROBLEM — G62.9 PERIPHERAL NEUROPATHY: Status: ACTIVE | Noted: 2025-02-07

## 2025-02-07 LAB
ANION GAP SERPL CALCULATED.3IONS-SCNC: 3 MMOL/L (ref 4–13)
BACTERIA BLD CULT: ABNORMAL
BACTERIA BLD CULT: ABNORMAL
BASOPHILS # BLD AUTO: 0.05 THOUSANDS/ΜL (ref 0–0.1)
BASOPHILS NFR BLD AUTO: 1 % (ref 0–1)
BUN SERPL-MCNC: 26 MG/DL (ref 5–25)
CALCIUM SERPL-MCNC: 8.5 MG/DL (ref 8.4–10.2)
CHLORIDE SERPL-SCNC: 101 MMOL/L (ref 96–108)
CO2 SERPL-SCNC: 30 MMOL/L (ref 21–32)
CREAT SERPL-MCNC: 1.08 MG/DL (ref 0.6–1.3)
EOSINOPHIL # BLD AUTO: 0.24 THOUSAND/ΜL (ref 0–0.61)
EOSINOPHIL NFR BLD AUTO: 5 % (ref 0–6)
ERYTHROCYTE [DISTWIDTH] IN BLOOD BY AUTOMATED COUNT: 13.1 % (ref 11.6–15.1)
GFR SERPL CREATININE-BSD FRML MDRD: 68 ML/MIN/1.73SQ M
GLUCOSE SERPL-MCNC: 118 MG/DL (ref 65–140)
GRAM STN SPEC: ABNORMAL
GRAM STN SPEC: ABNORMAL
HCT VFR BLD AUTO: 40.4 % (ref 36.5–49.3)
HGB BLD-MCNC: 13.1 G/DL (ref 12–17)
IMM GRANULOCYTES # BLD AUTO: 0.04 THOUSAND/UL (ref 0–0.2)
IMM GRANULOCYTES NFR BLD AUTO: 1 % (ref 0–2)
LYMPHOCYTES # BLD AUTO: 2.25 THOUSANDS/ΜL (ref 0.6–4.47)
LYMPHOCYTES NFR BLD AUTO: 46 % (ref 14–44)
MCH RBC QN AUTO: 31 PG (ref 26.8–34.3)
MCHC RBC AUTO-ENTMCNC: 32.4 G/DL (ref 31.4–37.4)
MCV RBC AUTO: 96 FL (ref 82–98)
MONOCYTES # BLD AUTO: 0.26 THOUSAND/ΜL (ref 0.17–1.22)
MONOCYTES NFR BLD AUTO: 5 % (ref 4–12)
NEUTROPHILS # BLD AUTO: 2.06 THOUSANDS/ΜL (ref 1.85–7.62)
NEUTS SEG NFR BLD AUTO: 42 % (ref 43–75)
NRBC BLD AUTO-RTO: 0 /100 WBCS
PLATELET # BLD AUTO: 216 THOUSANDS/UL (ref 149–390)
PMV BLD AUTO: 10.3 FL (ref 8.9–12.7)
POTASSIUM SERPL-SCNC: 4.7 MMOL/L (ref 3.5–5.3)
RBC # BLD AUTO: 4.23 MILLION/UL (ref 3.88–5.62)
S EPIDERMIDIS DNA BLD POS QL NAA+NON-PRB: DETECTED
SODIUM SERPL-SCNC: 134 MMOL/L (ref 135–147)
WBC # BLD AUTO: 4.9 THOUSAND/UL (ref 4.31–10.16)

## 2025-02-07 PROCEDURE — 85025 COMPLETE CBC W/AUTO DIFF WBC: CPT | Performed by: STUDENT IN AN ORGANIZED HEALTH CARE EDUCATION/TRAINING PROGRAM

## 2025-02-07 PROCEDURE — 80048 BASIC METABOLIC PNL TOTAL CA: CPT | Performed by: STUDENT IN AN ORGANIZED HEALTH CARE EDUCATION/TRAINING PROGRAM

## 2025-02-07 PROCEDURE — 99232 SBSQ HOSP IP/OBS MODERATE 35: CPT | Performed by: STUDENT IN AN ORGANIZED HEALTH CARE EDUCATION/TRAINING PROGRAM

## 2025-02-07 PROCEDURE — 99239 HOSP IP/OBS DSCHRG MGMT >30: CPT | Performed by: STUDENT IN AN ORGANIZED HEALTH CARE EDUCATION/TRAINING PROGRAM

## 2025-02-07 PROCEDURE — G0545 PR INHERENT VISIT TO INPT: HCPCS | Performed by: STUDENT IN AN ORGANIZED HEALTH CARE EDUCATION/TRAINING PROGRAM

## 2025-02-07 RX ADMIN — CLONIDINE HYDROCHLORIDE 0.1 MG: 0.1 TABLET ORAL at 09:51

## 2025-02-07 RX ADMIN — GABAPENTIN 100 MG: 100 CAPSULE ORAL at 09:50

## 2025-02-07 RX ADMIN — HEPARIN SODIUM 5000 UNITS: 5000 INJECTION INTRAVENOUS; SUBCUTANEOUS at 05:23

## 2025-02-07 RX ADMIN — CEFAZOLIN SODIUM 2000 MG: 2 SOLUTION INTRAVENOUS at 12:26

## 2025-02-07 RX ADMIN — CEFAZOLIN SODIUM 2000 MG: 2 SOLUTION INTRAVENOUS at 05:23

## 2025-02-07 RX ADMIN — PRAVASTATIN SODIUM 20 MG: 20 TABLET ORAL at 09:51

## 2025-02-07 RX ADMIN — TACROLIMUS 1 MG: 0.5 CAPSULE ORAL at 09:51

## 2025-02-07 RX ADMIN — TACROLIMUS 1 MG: 0.5 CAPSULE ORAL at 09:58

## 2025-02-07 RX ADMIN — PANTOPRAZOLE SODIUM 40 MG: 40 TABLET, DELAYED RELEASE ORAL at 09:51

## 2025-02-07 RX ADMIN — HEPARIN SODIUM 5000 UNITS: 5000 INJECTION INTRAVENOUS; SUBCUTANEOUS at 13:26

## 2025-02-07 RX ADMIN — OXCARBAZEPINE 300 MG: 150 TABLET, FILM COATED ORAL at 09:51

## 2025-02-07 NOTE — ASSESSMENT & PLAN NOTE
2 of 2 sets admission blood cultures are growing Staph epidermidis but these are 2 different strains. The patient has no systemic signs of infection, no leukocytosis or fever. Denies IVDU. Low concern for true infection, overall suspect these are contaminants. TTE no vegetations.  Repeat blood cultures without growth   -Discontinue cefazolin and monitor off antibiotics   -Follow-up repeat blood cultures

## 2025-02-07 NOTE — PROGRESS NOTES
Progress Note - Infectious Disease   Name: Yousif Weiner 70 y.o. male I MRN: [id number]  Unit/Bed#: -01 I Date of Admission: 2/3/2025   Date of Service: 2/7/2025 I Hospital Day: 3    Assessment & Plan  Toxic metabolic encephalopathy  Likely due to drug ingestion that was witnessed by police officers. UDS positive for cocaine, THC, opiates. Some improvement with Narcan. CTH without acute abnormalities. Mental status normalized   -monitor mental status  Positive blood culture  2 of 2 sets admission blood cultures are growing Staph epidermidis but these are 2 different strains. The patient has no systemic signs of infection, no leukocytosis or fever. Denies IVDU. Low concern for true infection, overall suspect these are contaminants. TTE no vegetations.  Repeat blood cultures without growth   -Discontinue cefazolin and monitor off antibiotics   -Follow-up repeat blood cultures  Liver transplanted (HCC)  Patient supposed to be on tacrolimus but he has not been able to afford this for the past week.    -continue tacrolimus inpatient   -GI follow up   -CM following for medication assistance   Acute kidney injury superimposed on chronic kidney disease  (HCC)  Lab Results   Component Value Date    EGFR 68 02/07/2025    EGFR 72 02/06/2025    EGFR 75 02/05/2025    CREATININE 1.08 02/07/2025    CREATININE 1.03 02/06/2025    CREATININE 0.99 02/05/2025   Creatinine elevated to 1.68 on admission from baseline around 1. Likely prerenal. SHANTAL resolved with IVF.   -repeat Cr tomorrow   -dose adjust antibiotics as needed for CrCl  Elevated CK  Likely due to dehydration, drug use. Improved with IVF  Abnormal CT scan, chest  CT chest shows a left basilar density, likely atelectasis. He has no signs or symptoms of pneumonia. Recommend incentive spirometer, ambulation. No indication for antibiotics for this issue.    Above management plan to continue IV Cefazolin discussed with Dr. Singh. Discussed with the patient at bedside.   Okay for discharge from ID perspective.    Antibiotics:  Cefazolin day 3    Subjective   The patient states he is feeling okay today, just tired.  No changes from yesterday.  No fevers or chills.    Objective :  Temp:  [98 °F (36.7 °C)-99.1 °F (37.3 °C)] 98 °F (36.7 °C)  HR:  [54-73] 57  BP: (131-154)/(70-80) 131/70  Resp:  [16] 16  SpO2:  [94 %-100 %] 95 %    General:  chronically ill appearing but no acute distress  Psychiatric:  Awake and alert  Cardiac: RRR, no murmurs  Pulmonary:  Normal respiratory excursion without accessory muscle use, decreased breath sounds bilaterally   Abdomen:  Soft, nontender  Extremities:  No edema  Skin:  few scratches on the arms, no cellulitis present  Neuro: moving all extremities spontaneously      Lab Results: I have reviewed the following results:  Results from last 7 days   Lab Units 02/07/25  0535 02/06/25  0555 02/05/25  0554   WBC Thousand/uL 4.90 4.74 4.78   HEMOGLOBIN g/dL 13.1 12.1 11.9*   PLATELETS Thousands/uL 216 205 197     Results from last 7 days   Lab Units 02/07/25  0535 02/06/25  0555 02/05/25  0554 02/04/25  0441 02/03/25  1520   SODIUM mmol/L 134* 135 136 137 133*   POTASSIUM mmol/L 4.7 4.7 4.0 3.5 3.7   CHLORIDE mmol/L 101 102 105 105 98   CO2 mmol/L 30 31 27 24 28   BUN mg/dL 26* 20 23 22 32*   CREATININE mg/dL 1.08 1.03 0.99 1.03 1.68*   EGFR ml/min/1.73sq m 68 72 75 72 39   CALCIUM mg/dL 8.5 8.7 8.4 7.8* 8.7   AST U/L  --   --   --  29 61*   ALT U/L  --   --   --  24 37   ALK PHOS U/L  --   --   --  143* 198*   ALBUMIN g/dL  --   --   --  3.1* 4.0     Results from last 7 days   Lab Units 02/04/25  1656 02/03/25  1713   BLOOD CULTURE  No Growth at 48 hrs.  No Growth at 48 hrs. Staphylococcus epidermidis*  Staphylococcus epidermidis*   GRAM STAIN RESULT   --  Gram positive cocci in clusters*  Gram positive cocci in clusters*     Results from last 7 days   Lab Units 02/03/25  1520   PROCALCITONIN ng/ml 0.68*

## 2025-02-07 NOTE — CASE MANAGEMENT
Case Management Discharge Planning Note    Patient name Yousif Weiner  Location /-01 MRN [id number]  : 1955 Date 2025       Current Admission Date: 2/3/2025  Current Admission Diagnosis:Toxic metabolic encephalopathy   Patient Active Problem List    Diagnosis Date Noted Date Diagnosed    Peripheral neuropathy 2025     Positive blood culture 2025     Toxic metabolic encephalopathy 2025     Acute kidney injury superimposed on chronic kidney disease  (HCC) 2025     Elevated CK 2025     Abnormal CT scan, chest 2025     Hyponatremia 2025     Chronic diastolic heart failure (HCC) 2022     Stage 3a chronic kidney disease (HCC) 2022     Depression, recurrent (HCC) 2021     Other specified disorders of arteries and arterioles (HCC) 2021     Type 2 diabetes mellitus, without long-term current use of insulin (HCC) 2021     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HCC) 2020     Hepatitis C virus carrier state (HCC) 2020     Hepatic fibrosis 2019     Blood clotting disorder (HCC) 2019     Tobacco abuse 2018     Idiopathic peripheral neuropathy 2018     Primary osteoarthritis of left knee 03/15/2018     Liver transplanted (HCC) 03/15/2018     Immunosuppression (HCC) 2016     Arteriosclerosis of coronary artery 2014     Anxiety 2013     Chronic obstructive pulmonary disease (HCC) 2013     Esophagitis, reflux 2013     Hyperlipidemia 2013     Hypertension 2013     Impaired fasting glucose 2013       LOS (days): 3  Geometric Mean LOS (GMLOS) (days): 3.8  Days to GMLOS:0.8     OBJECTIVE:  Risk of Unplanned Readmission Score: 13.94         Current admission status: Inpatient   Preferred Pharmacy:   [id number] - [email or web address] [address] [address]ABA[address] - [address] [place] [address]  [place] [address]  Phone: [phone] Fax: [phone]    Primary Care  Provider: Sebastian Spicer MD    Primary Insurance: [facility] REP  Secondary Insurance: [employer]    DISCHARGE DETAILS:     CM faxed cane order to facility. [phone]    Cm updated  torin from [facility]. [phone].     Nurse provided fax number to facility and will fax paperwork.

## 2025-02-07 NOTE — ASSESSMENT & PLAN NOTE
Lab Results   Component Value Date    EGFR 68 02/07/2025    EGFR 72 02/06/2025    EGFR 75 02/05/2025    CREATININE 1.08 02/07/2025    CREATININE 1.03 02/06/2025    CREATININE 0.99 02/05/2025   Creatinine elevated to 1.68 on admission from baseline around 1. Likely prerenal. SHANTAL resolved with IVF.   -repeat Cr tomorrow   -dose adjust antibiotics as needed for CrCl

## 2025-02-09 LAB
BACTERIA BLD CULT: NORMAL
BACTERIA BLD CULT: NORMAL

## 2025-02-09 NOTE — DISCHARGE SUMMARY
Discharge Summary - Hospitalist   Name: Yousif Weiner 70 y.o. male I MRN: [id number]  Unit/Bed#: -01 I Date of Admission: 2/3/2025   Date of Service: 2/7/2025 I Hospital Day: 3     Assessment & Plan  Toxic metabolic encephalopathy  resolved  Positive blood culture  2 out of 2blood cultures growing gram positive cocci in clusters-Staph epidermidis  Discussed with ID will be continued on Ancef until speciation and repeat cultures are negative  Echo showed no valvular vegetation  Repeat cultures drawn last night, negative for 24 hours  Liver transplanted (HCC)  History of liver transplant-is supposed to be on tacrolimus however he says he cannot afford and has not been taking it for at least a week  Will continue here  Discussed with case management will need reliable way for patient to get this once discharged-prescription sent  Acute kidney injury superimposed on chronic kidney disease  (HCC)  Lab Results   Component Value Date    EGFR 68 02/07/2025    EGFR 72 02/06/2025    EGFR 75 02/05/2025    CREATININE 1.08 02/07/2025    CREATININE 1.03 02/06/2025    CREATININE 0.99 02/05/2025     Resolved with IV fluids, was likely prerenal  Elevated CK  Resolved with fluids  Abnormal CT scan, chest  Less likely pneumonia and more likely atelectasis  Monitor resp status    Hyponatremia  Resolved with fluids, sodium is 136 today  Peripheral neuropathy       Medical Problems       Resolved Problems  Date Reviewed: 7/7/2022   None       Discharging Physician / Practitioner: Azeem Singh MD  PCP: Sebastian Spicer MD  Admission Date:   Admission Orders (From admission, onward)       Ordered        02/04/25 1553  INPATIENT ADMISSION  Once            02/03/25 1645  Place in Observation  Once                          Discharge Date: 02/07/25    Consultations During Hospital Stay:  ID    Procedures Performed:   None    Significant Findings / Test Results:   None    Incidental Findings:   None  Test Results Pending at Discharge  "(will require follow up):   None     Outpatient Tests Requested:  None    Complications: None    Reason for Admission: Altered mental status    Hospital Course:   Yousif Weiner is a 70 y.o. male patient who originally presented to the hospital on 2/3/2025 due to altered mental status.  He was found to have hyponatremia, and with an SHANTAL he was treated inpatient with IV fluids and his mental status as well as sodium levels and renal function improved.  Hospital course was complicated by 2 out of 2 blood cultures growing staph epidermidis.  ID was consulted, he was given a short course of antibiotics however ultimately repeat cultures were negative and this was found to be likely contaminant.  He will be discharged in stable condition with instructions to follow-up outpatient with his primary care doctor.        Please see above list of diagnoses and related plan for additional information.     Condition at Discharge: stable    Discharge Day Visit / Exam:   Subjective: States he is in no hurry to go back, no complaints  Vitals: Blood Pressure: 131/70 (02/07/25 0724)  Pulse: 57 (02/07/25 0900)  Temperature: 98 °F (36.7 °C) (02/07/25 0724)  Temp Source: Oral (02/05/25 0801)  Respirations: 16 (02/07/25 0724)  Height: 6' 1\" (185.4 cm) (02/05/25 1145)  Weight - Scale: 71.7 kg (158 lb) (02/05/25 1145)  SpO2: 95 % (02/07/25 0900)  Physical Exam  Constitutional:       Appearance: Normal appearance.      Comments: Handcuffed to bed   HENT:      Head: Normocephalic and atraumatic.   Cardiovascular:      Rate and Rhythm: Normal rate and regular rhythm.   Pulmonary:      Effort: Pulmonary effort is normal.      Breath sounds: Normal breath sounds.   Neurological:      General: No focal deficit present.      Mental Status: He is alert and oriented to person, place, and time.        Discharge instructions/Information to patient and family:   See after visit summary for information provided to patient and family.      Provisions " for Follow-Up Care:  See after visit summary for information related to follow-up care and any pertinent home health orders.      Mobility at time of Discharge:   Basic Mobility Inpatient Raw Score: 24  JH-HLM Goal: 8: Walk 250 feet or more  JH-HLM Achieved: 6: Walk 10 steps or more  HLM Goal NOT achieved. Continue to encourage mobility in post discharge setting.     Disposition:   Other: Straight to intermediate    Planned Readmission: No    Discharge Medications:  See after visit summary for reconciled discharge medications provided to patient and/or family.      Administrative Statements   Discharge Statement:  I have spent a total time of 36 minutes in caring for this patient on the day of the visit/encounter. >30 minutes of time was spent on: Diagnostic results, Counseling / Coordination of care, Documenting in the medical record, Reviewing / ordering tests, medicine, procedures  , and Communicating with other healthcare professionals .    **Please Note: This note may have been constructed using a voice recognition system**

## 2025-02-09 NOTE — ASSESSMENT & PLAN NOTE
Lab Results   Component Value Date    EGFR 68 02/07/2025    EGFR 72 02/06/2025    EGFR 75 02/05/2025    CREATININE 1.08 02/07/2025    CREATININE 1.03 02/06/2025    CREATININE 0.99 02/05/2025     Resolved with IV fluids, was likely prerenal

## 2025-05-13 ENCOUNTER — VBI (OUTPATIENT)
Dept: ADMINISTRATIVE | Facility: OTHER | Age: 70
End: 2025-05-13

## 2025-05-13 NOTE — TELEPHONE ENCOUNTER
05/13/25 12:33 PM     Chart reviewed for Diabetic Eye Exam ; nothing is submitted to the patient's insurance at this time.     Monserrat Rivera MA   [facility]

## (undated) DEVICE — POOLE SUCTION HANDLE: Brand: CARDINAL HEALTH

## (undated) DEVICE — CULTURE TUBE AEROBIC

## (undated) DEVICE — ABDOMINAL PAD: Brand: DERMACEA

## (undated) DEVICE — SCD SEQUENTIAL COMPRESSION COMFORT SLEEVE MEDIUM KNEE LENGTH: Brand: KENDALL SCD

## (undated) DEVICE — NEEDLE 25G X 1 1/2

## (undated) DEVICE — TUBING SUCTION 5MM X 12 FT

## (undated) DEVICE — PAD GROUNDING ADULT

## (undated) DEVICE — DRAPE EQUIPMENT RF WAND

## (undated) DEVICE — LIGHT HANDLE COVER SLEEVE DISP BLUE STELLAR

## (undated) DEVICE — BETHLEHEM UNIVERSAL MINOR GEN: Brand: CARDINAL HEALTH

## (undated) DEVICE — INTENDED FOR TISSUE SEPARATION, AND OTHER PROCEDURES THAT REQUIRE A SHARP SURGICAL BLADE TO PUNCTURE OR CUT.: Brand: BARD-PARKER SAFETY BLADES SIZE 15, STERILE

## (undated) DEVICE — CHLORAPREP HI-LITE 26ML ORANGE

## (undated) DEVICE — CULTURE TUBE ANAEROBIC

## (undated) DEVICE — CURITY PLAIN PACKING STRIP: Brand: CURITY

## (undated) DEVICE — GAUZE SPONGES,USP TYPE VII GAUZE, 12 PLY: Brand: CURITY

## (undated) DEVICE — STRETCH BANDAGE: Brand: CURITY

## (undated) DEVICE — SPONGE STICK WITH PVP-I: Brand: KENDALL

## (undated) DEVICE — GLOVE SRG BIOGEL 7